# Patient Record
Sex: FEMALE | Race: WHITE | NOT HISPANIC OR LATINO | Employment: OTHER | URBAN - NONMETROPOLITAN AREA
[De-identification: names, ages, dates, MRNs, and addresses within clinical notes are randomized per-mention and may not be internally consistent; named-entity substitution may affect disease eponyms.]

---

## 2023-01-17 ENCOUNTER — TELEPHONE (OUTPATIENT)
Dept: GASTROENTEROLOGY | Facility: CLINIC | Age: 78
End: 2023-01-17
Payer: MEDICARE

## 2023-07-03 PROBLEM — R55 SYNCOPE, UNSPECIFIED SYNCOPE TYPE: Status: ACTIVE | Noted: 2023-07-03

## 2023-07-04 PROBLEM — Z86.73 HISTORY OF CVA (CEREBROVASCULAR ACCIDENT): Status: ACTIVE | Noted: 2023-07-04

## 2023-07-05 PROBLEM — I95.1 ORTHOSTATIC HYPOTENSION: Status: ACTIVE | Noted: 2023-07-05

## 2023-07-24 ENCOUNTER — OFFICE VISIT (OUTPATIENT)
Dept: WOUND CARE | Facility: HOSPITAL | Age: 78
End: 2023-07-24
Payer: MEDICARE

## 2023-07-24 PROCEDURE — G0463 HOSPITAL OUTPT CLINIC VISIT: HCPCS

## 2024-05-02 ENCOUNTER — HOSPITAL ENCOUNTER (INPATIENT)
Age: 79
LOS: 29 days | Discharge: HOSPICE | End: 2024-05-31
Attending: INTERNAL MEDICINE
Payer: MEDICARE

## 2024-05-02 DIAGNOSIS — I48.19 ATRIAL FIBRILLATION, PERSISTENT (HCC): ICD-10-CM

## 2024-05-02 DIAGNOSIS — I48.19 PERSISTENT ATRIAL FIBRILLATION (HCC): Primary | ICD-10-CM

## 2024-05-02 PROBLEM — U07.1 COVID-19: Status: ACTIVE | Noted: 2024-05-02

## 2024-05-02 LAB
CRP SERPL HS-MCNC: 1.95 MG/DL (ref 0–0.5)
GLUCOSE BLD-MCNC: 101 MG/DL (ref 70–99)
INR PPP: 1.14 (ref 0.88–1.18)
PERFORMED ON: ABNORMAL
PROTHROMBIN TIME: 14.3 SEC (ref 12–14.6)
SARS-COV-2, RAPID: POSITIVE
SODIUM UR-SCNC: 40 MMOL/L

## 2024-05-02 PROCEDURE — 2580000003 HC RX 258: Performed by: INTERNAL MEDICINE

## 2024-05-02 PROCEDURE — 85610 PROTHROMBIN TIME: CPT

## 2024-05-02 PROCEDURE — 83930 ASSAY OF BLOOD OSMOLALITY: CPT

## 2024-05-02 PROCEDURE — 84300 ASSAY OF URINE SODIUM: CPT

## 2024-05-02 PROCEDURE — 83935 ASSAY OF URINE OSMOLALITY: CPT

## 2024-05-02 PROCEDURE — 36415 COLL VENOUS BLD VENIPUNCTURE: CPT

## 2024-05-02 PROCEDURE — 2100000000 HC CCU R&B

## 2024-05-02 PROCEDURE — 6360000002 HC RX W HCPCS: Performed by: INTERNAL MEDICINE

## 2024-05-02 PROCEDURE — 82962 GLUCOSE BLOOD TEST: CPT

## 2024-05-02 PROCEDURE — 6370000000 HC RX 637 (ALT 250 FOR IP): Performed by: INTERNAL MEDICINE

## 2024-05-02 PROCEDURE — 86140 C-REACTIVE PROTEIN: CPT

## 2024-05-02 PROCEDURE — 2500000003 HC RX 250 WO HCPCS: Performed by: INTERNAL MEDICINE

## 2024-05-02 RX ORDER — LOSARTAN POTASSIUM 50 MG/1
50 TABLET ORAL DAILY
COMMUNITY

## 2024-05-02 RX ORDER — WARFARIN SODIUM 3 MG/1
3 TABLET ORAL DAILY
Status: DISCONTINUED | OUTPATIENT
Start: 2024-05-03 | End: 2024-05-03

## 2024-05-02 RX ORDER — FERROUS SULFATE 325(65) MG
325 TABLET ORAL
Status: DISCONTINUED | OUTPATIENT
Start: 2024-05-03 | End: 2024-05-04

## 2024-05-02 RX ORDER — OMEPRAZOLE 20 MG/1
20 CAPSULE, DELAYED RELEASE ORAL DAILY
COMMUNITY

## 2024-05-02 RX ORDER — INSULIN LISPRO 100 [IU]/ML
0-4 INJECTION, SOLUTION INTRAVENOUS; SUBCUTANEOUS NIGHTLY
Status: DISCONTINUED | OUTPATIENT
Start: 2024-05-02 | End: 2024-05-15

## 2024-05-02 RX ORDER — METOPROLOL TARTRATE 50 MG/1
50 TABLET, FILM COATED ORAL 2 TIMES DAILY
Status: DISCONTINUED | OUTPATIENT
Start: 2024-05-02 | End: 2024-05-04

## 2024-05-02 RX ORDER — POTASSIUM CHLORIDE 29.8 MG/ML
20 INJECTION INTRAVENOUS PRN
Status: DISCONTINUED | OUTPATIENT
Start: 2024-05-02 | End: 2024-05-31 | Stop reason: HOSPADM

## 2024-05-02 RX ORDER — METFORMIN HYDROCHLORIDE 500 MG/1
1000 TABLET, EXTENDED RELEASE ORAL
COMMUNITY

## 2024-05-02 RX ORDER — ATORVASTATIN CALCIUM 20 MG/1
20 TABLET, FILM COATED ORAL DAILY
Status: DISCONTINUED | OUTPATIENT
Start: 2024-05-03 | End: 2024-05-31 | Stop reason: HOSPADM

## 2024-05-02 RX ORDER — AMIODARONE HYDROCHLORIDE 200 MG/1
200 TABLET ORAL DAILY
Status: DISCONTINUED | OUTPATIENT
Start: 2024-05-03 | End: 2024-05-09 | Stop reason: ALTCHOICE

## 2024-05-02 RX ORDER — ACETAMINOPHEN 325 MG/1
650 TABLET ORAL EVERY 6 HOURS PRN
Status: DISCONTINUED | OUTPATIENT
Start: 2024-05-02 | End: 2024-05-31 | Stop reason: HOSPADM

## 2024-05-02 RX ORDER — INSULIN LISPRO 100 [IU]/ML
0-8 INJECTION, SOLUTION INTRAVENOUS; SUBCUTANEOUS
Status: DISCONTINUED | OUTPATIENT
Start: 2024-05-03 | End: 2024-05-15

## 2024-05-02 RX ORDER — POTASSIUM CHLORIDE 7.45 MG/ML
10 INJECTION INTRAVENOUS PRN
Status: DISCONTINUED | OUTPATIENT
Start: 2024-05-02 | End: 2024-05-31 | Stop reason: HOSPADM

## 2024-05-02 RX ORDER — DEXAMETHASONE SODIUM PHOSPHATE 10 MG/ML
6 INJECTION, SOLUTION INTRAMUSCULAR; INTRAVENOUS EVERY 24 HOURS
Status: DISCONTINUED | OUTPATIENT
Start: 2024-05-02 | End: 2024-05-03

## 2024-05-02 RX ORDER — MAGNESIUM SULFATE IN WATER 40 MG/ML
2000 INJECTION, SOLUTION INTRAVENOUS PRN
Status: DISCONTINUED | OUTPATIENT
Start: 2024-05-02 | End: 2024-05-31 | Stop reason: HOSPADM

## 2024-05-02 RX ORDER — ASPIRIN 81 MG/1
81 TABLET ORAL DAILY
COMMUNITY

## 2024-05-02 RX ORDER — LISINOPRIL 20 MG/1
20 TABLET ORAL DAILY
Status: DISCONTINUED | OUTPATIENT
Start: 2024-05-03 | End: 2024-05-03

## 2024-05-02 RX ORDER — INSULIN GLARGINE 100 [IU]/ML
45 INJECTION, SOLUTION SUBCUTANEOUS NIGHTLY
Status: ON HOLD | COMMUNITY
End: 2024-05-07 | Stop reason: HOSPADM

## 2024-05-02 RX ORDER — ACETAMINOPHEN 650 MG/1
650 SUPPOSITORY RECTAL EVERY 6 HOURS PRN
Status: DISCONTINUED | OUTPATIENT
Start: 2024-05-02 | End: 2024-05-31 | Stop reason: HOSPADM

## 2024-05-02 RX ORDER — AMLODIPINE BESYLATE 5 MG/1
5 TABLET ORAL DAILY
Status: DISCONTINUED | OUTPATIENT
Start: 2024-05-03 | End: 2024-05-03

## 2024-05-02 RX ORDER — SENNA AND DOCUSATE SODIUM 50; 8.6 MG/1; MG/1
1 TABLET, FILM COATED ORAL DAILY
Status: DISCONTINUED | OUTPATIENT
Start: 2024-05-03 | End: 2024-05-13

## 2024-05-02 RX ORDER — CELECOXIB 200 MG/1
200 CAPSULE ORAL 2 TIMES DAILY
Status: DISCONTINUED | OUTPATIENT
Start: 2024-05-03 | End: 2024-05-03

## 2024-05-02 RX ORDER — OXYCODONE HYDROCHLORIDE 5 MG/1
5 TABLET ORAL EVERY 6 HOURS PRN
Status: DISCONTINUED | OUTPATIENT
Start: 2024-05-02 | End: 2024-05-10

## 2024-05-02 RX ORDER — DILTIAZEM HYDROCHLORIDE 5 MG/ML
10 INJECTION INTRAVENOUS ONCE
Status: DISCONTINUED | OUTPATIENT
Start: 2024-05-02 | End: 2024-05-03

## 2024-05-02 RX ORDER — DONEPEZIL HYDROCHLORIDE 10 MG/1
10 TABLET, FILM COATED ORAL NIGHTLY
Status: DISCONTINUED | OUTPATIENT
Start: 2024-05-02 | End: 2024-05-31 | Stop reason: HOSPADM

## 2024-05-02 RX ORDER — LEVOTHYROXINE SODIUM 0.07 MG/1
75 TABLET ORAL DAILY
COMMUNITY

## 2024-05-02 RX ORDER — ACETAMINOPHEN 500 MG
1000 TABLET ORAL EVERY 12 HOURS
COMMUNITY

## 2024-05-02 RX ADMIN — ACETAMINOPHEN 650 MG: 325 TABLET ORAL at 21:39

## 2024-05-02 RX ADMIN — METOPROLOL TARTRATE 50 MG: 50 TABLET, FILM COATED ORAL at 21:34

## 2024-05-02 RX ADMIN — DEXAMETHASONE SODIUM PHOSPHATE 6 MG: 10 INJECTION INTRAMUSCULAR; INTRAVENOUS at 21:33

## 2024-05-02 RX ADMIN — DILTIAZEM HYDROCHLORIDE 5 MG/HR: 5 INJECTION, SOLUTION INTRAVENOUS at 20:30

## 2024-05-02 RX ADMIN — DONEPEZIL HYDROCHLORIDE 10 MG: 10 TABLET, FILM COATED ORAL at 21:34

## 2024-05-02 ASSESSMENT — PAIN DESCRIPTION - ORIENTATION: ORIENTATION: LEFT

## 2024-05-02 ASSESSMENT — PAIN DESCRIPTION - DESCRIPTORS: DESCRIPTORS: ACHING;SORE

## 2024-05-02 ASSESSMENT — PAIN - FUNCTIONAL ASSESSMENT: PAIN_FUNCTIONAL_ASSESSMENT: PREVENTS OR INTERFERES SOME ACTIVE ACTIVITIES AND ADLS

## 2024-05-02 ASSESSMENT — PAIN SCALES - GENERAL
PAINLEVEL_OUTOF10: 7
PAINLEVEL_OUTOF10: 4

## 2024-05-02 ASSESSMENT — PAIN DESCRIPTION - LOCATION: LOCATION: KNEE

## 2024-05-03 ENCOUNTER — APPOINTMENT (OUTPATIENT)
Dept: GENERAL RADIOLOGY | Age: 79
End: 2024-05-03
Attending: INTERNAL MEDICINE
Payer: MEDICARE

## 2024-05-03 LAB
ALBUMIN SERPL-MCNC: 3.1 G/DL (ref 3.5–5.2)
ALP SERPL-CCNC: 109 U/L (ref 35–104)
ALT SERPL-CCNC: 15 U/L (ref 5–33)
ANION GAP SERPL CALCULATED.3IONS-SCNC: 12 MMOL/L (ref 7–19)
AST SERPL-CCNC: 17 U/L (ref 5–32)
BASOPHILS # BLD: 0 K/UL (ref 0–0.2)
BASOPHILS NFR BLD: 0.3 % (ref 0–1)
BILIRUB SERPL-MCNC: <0.2 MG/DL (ref 0.2–1.2)
BUN SERPL-MCNC: 23 MG/DL (ref 8–23)
CALCIUM SERPL-MCNC: 8.5 MG/DL (ref 8.8–10.2)
CHLORIDE SERPL-SCNC: 85 MMOL/L (ref 98–111)
CO2 SERPL-SCNC: 21 MMOL/L (ref 22–29)
CREAT SERPL-MCNC: 1.1 MG/DL (ref 0.5–0.9)
D DIMER PPP FEU-MCNC: 0.97 UG/ML FEU (ref 0–0.48)
EOSINOPHIL # BLD: 0 K/UL (ref 0–0.6)
EOSINOPHIL NFR BLD: 0.3 % (ref 0–5)
ERYTHROCYTE [DISTWIDTH] IN BLOOD BY AUTOMATED COUNT: 14.1 % (ref 11.5–14.5)
FERRITIN SERPL-MCNC: 44.9 NG/ML (ref 13–150)
GLUCOSE BLD-MCNC: 128 MG/DL (ref 70–99)
GLUCOSE BLD-MCNC: 132 MG/DL (ref 70–99)
GLUCOSE BLD-MCNC: 161 MG/DL (ref 70–99)
GLUCOSE BLD-MCNC: 209 MG/DL (ref 70–99)
GLUCOSE BLD-MCNC: 253 MG/DL (ref 70–99)
GLUCOSE BLD-MCNC: 356 MG/DL (ref 70–99)
GLUCOSE BLD-MCNC: 392 MG/DL (ref 70–99)
GLUCOSE BLD-MCNC: 396 MG/DL (ref 70–99)
GLUCOSE BLD-MCNC: 476 MG/DL (ref 70–99)
GLUCOSE SERPL-MCNC: 353 MG/DL (ref 74–109)
HBA1C MFR BLD: 8 % (ref 4–6)
HCT VFR BLD AUTO: 35.5 % (ref 37–47)
HGB BLD-MCNC: 11.3 G/DL (ref 12–16)
IMM GRANULOCYTES # BLD: 0.1 K/UL
LDH SERPL-CCNC: 158 U/L (ref 91–215)
LYMPHOCYTES # BLD: 1.1 K/UL (ref 1.1–4.5)
LYMPHOCYTES NFR BLD: 9.5 % (ref 20–40)
MAGNESIUM SERPL-MCNC: 1.5 MG/DL (ref 1.6–2.4)
MCH RBC QN AUTO: 25.1 PG (ref 27–31)
MCHC RBC AUTO-ENTMCNC: 31.8 G/DL (ref 33–37)
MCV RBC AUTO: 78.9 FL (ref 81–99)
MONOCYTES # BLD: 0.1 K/UL (ref 0–0.9)
MONOCYTES NFR BLD: 1.2 % (ref 0–10)
NEUTROPHILS # BLD: 10 K/UL (ref 1.5–7.5)
NEUTS SEG NFR BLD: 88.2 % (ref 50–65)
OSMOLALITY SERPL CALC.SUM OF ELEC: 257 MOSM/KG (ref 275–300)
OSMOLALITY URINE: 354 MOSM/KG (ref 250–1200)
PERFORMED ON: ABNORMAL
PLATELET # BLD AUTO: 367 K/UL (ref 130–400)
PMV BLD AUTO: 8.9 FL (ref 9.4–12.3)
POTASSIUM SERPL-SCNC: 6 MMOL/L (ref 3.5–5)
POTASSIUM SERPL-SCNC: 6 MMOL/L (ref 3.5–5)
PROT SERPL-MCNC: 7 G/DL (ref 6.6–8.7)
RBC # BLD AUTO: 4.5 M/UL (ref 4.2–5.4)
REASON FOR REJECTION: NORMAL
REJECTED TEST: NORMAL
SODIUM SERPL-SCNC: 118 MMOL/L (ref 136–145)
SODIUM SERPL-SCNC: 118 MMOL/L (ref 136–145)
SODIUM SERPL-SCNC: 121 MMOL/L (ref 136–145)
SODIUM SERPL-SCNC: 122 MMOL/L (ref 136–145)
WBC # BLD AUTO: 11.4 K/UL (ref 4.8–10.8)

## 2024-05-03 PROCEDURE — 83615 LACTATE (LD) (LDH) ENZYME: CPT

## 2024-05-03 PROCEDURE — 6370000000 HC RX 637 (ALT 250 FOR IP): Performed by: INTERNAL MEDICINE

## 2024-05-03 PROCEDURE — 83735 ASSAY OF MAGNESIUM: CPT

## 2024-05-03 PROCEDURE — 2580000003 HC RX 258: Performed by: INTERNAL MEDICINE

## 2024-05-03 PROCEDURE — 84132 ASSAY OF SERUM POTASSIUM: CPT

## 2024-05-03 PROCEDURE — 83036 HEMOGLOBIN GLYCOSYLATED A1C: CPT

## 2024-05-03 PROCEDURE — 71045 X-RAY EXAM CHEST 1 VIEW: CPT

## 2024-05-03 PROCEDURE — 82728 ASSAY OF FERRITIN: CPT

## 2024-05-03 PROCEDURE — 84295 ASSAY OF SERUM SODIUM: CPT

## 2024-05-03 PROCEDURE — 6360000002 HC RX W HCPCS

## 2024-05-03 PROCEDURE — 36415 COLL VENOUS BLD VENIPUNCTURE: CPT

## 2024-05-03 PROCEDURE — 80053 COMPREHEN METABOLIC PANEL: CPT

## 2024-05-03 PROCEDURE — 2100000000 HC CCU R&B

## 2024-05-03 PROCEDURE — 6360000002 HC RX W HCPCS: Performed by: INTERNAL MEDICINE

## 2024-05-03 PROCEDURE — 82962 GLUCOSE BLOOD TEST: CPT

## 2024-05-03 PROCEDURE — 85025 COMPLETE CBC W/AUTO DIFF WBC: CPT

## 2024-05-03 PROCEDURE — 85379 FIBRIN DEGRADATION QUANT: CPT

## 2024-05-03 RX ORDER — NALOXONE HYDROCHLORIDE 0.4 MG/ML
INJECTION, SOLUTION INTRAMUSCULAR; INTRAVENOUS; SUBCUTANEOUS
Status: COMPLETED
Start: 2024-05-03 | End: 2024-05-03

## 2024-05-03 RX ORDER — SODIUM CHLORIDE 1 G/1
1 TABLET ORAL
Status: DISCONTINUED | OUTPATIENT
Start: 2024-05-03 | End: 2024-05-05

## 2024-05-03 RX ORDER — MAGNESIUM SULFATE IN WATER 40 MG/ML
2000 INJECTION, SOLUTION INTRAVENOUS ONCE
Status: DISCONTINUED | OUTPATIENT
Start: 2024-05-03 | End: 2024-05-03

## 2024-05-03 RX ORDER — INSULIN GLARGINE 100 [IU]/ML
10 INJECTION, SOLUTION SUBCUTANEOUS 2 TIMES DAILY
Status: DISCONTINUED | OUTPATIENT
Start: 2024-05-03 | End: 2024-05-04

## 2024-05-03 RX ORDER — ERGOCALCIFEROL 1.25 MG/1
50000 CAPSULE ORAL WEEKLY
Status: DISCONTINUED | OUTPATIENT
Start: 2024-05-03 | End: 2024-05-31 | Stop reason: HOSPADM

## 2024-05-03 RX ORDER — DEXTROSE MONOHYDRATE 100 MG/ML
INJECTION, SOLUTION INTRAVENOUS CONTINUOUS PRN
Status: DISCONTINUED | OUTPATIENT
Start: 2024-05-03 | End: 2024-05-09 | Stop reason: SDUPTHER

## 2024-05-03 RX ORDER — PROMETHAZINE HYDROCHLORIDE 25 MG/ML
6.25 INJECTION, SOLUTION INTRAMUSCULAR; INTRAVENOUS ONCE
Status: COMPLETED | OUTPATIENT
Start: 2024-05-03 | End: 2024-05-03

## 2024-05-03 RX ORDER — NALOXONE HYDROCHLORIDE 0.4 MG/ML
0.4 INJECTION, SOLUTION INTRAMUSCULAR; INTRAVENOUS; SUBCUTANEOUS PRN
Status: DISCONTINUED | OUTPATIENT
Start: 2024-05-03 | End: 2024-05-31 | Stop reason: HOSPADM

## 2024-05-03 RX ORDER — INSULIN LISPRO 100 [IU]/ML
6 INJECTION, SOLUTION INTRAVENOUS; SUBCUTANEOUS
Status: DISCONTINUED | OUTPATIENT
Start: 2024-05-03 | End: 2024-05-13

## 2024-05-03 RX ORDER — INSULIN GLARGINE 100 [IU]/ML
5 INJECTION, SOLUTION SUBCUTANEOUS ONCE
Status: COMPLETED | OUTPATIENT
Start: 2024-05-03 | End: 2024-05-03

## 2024-05-03 RX ORDER — FUROSEMIDE 20 MG/1
20 TABLET ORAL 2 TIMES DAILY
Status: DISCONTINUED | OUTPATIENT
Start: 2024-05-03 | End: 2024-05-04

## 2024-05-03 RX ORDER — DEXAMETHASONE SODIUM PHOSPHATE 4 MG/ML
4 INJECTION, SOLUTION INTRA-ARTICULAR; INTRALESIONAL; INTRAMUSCULAR; INTRAVENOUS; SOFT TISSUE EVERY 24 HOURS
Status: DISCONTINUED | OUTPATIENT
Start: 2024-05-04 | End: 2024-05-04

## 2024-05-03 RX ORDER — SODIUM CHLORIDE 9 MG/ML
INJECTION, SOLUTION INTRAVENOUS CONTINUOUS
Status: DISCONTINUED | OUTPATIENT
Start: 2024-05-03 | End: 2024-05-03

## 2024-05-03 RX ORDER — MAGNESIUM SULFATE IN WATER 40 MG/ML
2000 INJECTION, SOLUTION INTRAVENOUS ONCE
Status: COMPLETED | OUTPATIENT
Start: 2024-05-03 | End: 2024-05-03

## 2024-05-03 RX ORDER — DEXTROSE MONOHYDRATE 100 MG/ML
INJECTION, SOLUTION INTRAVENOUS CONTINUOUS PRN
Status: DISCONTINUED | OUTPATIENT
Start: 2024-05-03 | End: 2024-05-31 | Stop reason: HOSPADM

## 2024-05-03 RX ADMIN — SODIUM CHLORIDE: 9 INJECTION, SOLUTION INTRAVENOUS at 04:35

## 2024-05-03 RX ADMIN — DONEPEZIL HYDROCHLORIDE 10 MG: 10 TABLET, FILM COATED ORAL at 20:09

## 2024-05-03 RX ADMIN — INSULIN LISPRO 6 UNITS: 100 INJECTION, SOLUTION INTRAVENOUS; SUBCUTANEOUS at 16:57

## 2024-05-03 RX ADMIN — SODIUM ZIRCONIUM CYCLOSILICATE 10 G: 10 POWDER, FOR SUSPENSION ORAL at 08:59

## 2024-05-03 RX ADMIN — SODIUM CHLORIDE 1 G: 1 TABLET ORAL at 18:10

## 2024-05-03 RX ADMIN — BARICITINIB 2 MG: 2 TABLET, FILM COATED ORAL at 09:00

## 2024-05-03 RX ADMIN — SODIUM ZIRCONIUM CYCLOSILICATE 10 G: 10 POWDER, FOR SUSPENSION ORAL at 18:11

## 2024-05-03 RX ADMIN — INSULIN LISPRO 8 UNITS: 100 INJECTION, SOLUTION INTRAVENOUS; SUBCUTANEOUS at 12:37

## 2024-05-03 RX ADMIN — FUROSEMIDE 20 MG: 20 TABLET ORAL at 10:36

## 2024-05-03 RX ADMIN — METOPROLOL TARTRATE 50 MG: 50 TABLET, FILM COATED ORAL at 10:40

## 2024-05-03 RX ADMIN — GLUCAGON 1 MG: KIT at 03:16

## 2024-05-03 RX ADMIN — MAGNESIUM SULFATE HEPTAHYDRATE 2000 MG: 40 INJECTION, SOLUTION INTRAVENOUS at 04:39

## 2024-05-03 RX ADMIN — ACETAMINOPHEN 650 MG: 325 TABLET ORAL at 05:38

## 2024-05-03 RX ADMIN — DICLOFENAC SODIUM 2 G: 10 GEL TOPICAL at 20:21

## 2024-05-03 RX ADMIN — Medication 5000 UNITS: at 18:10

## 2024-05-03 RX ADMIN — AMIODARONE HYDROCHLORIDE 200 MG: 200 TABLET ORAL at 10:39

## 2024-05-03 RX ADMIN — FUROSEMIDE 20 MG: 20 TABLET ORAL at 18:11

## 2024-05-03 RX ADMIN — CELECOXIB 200 MG: 200 CAPSULE ORAL at 09:00

## 2024-05-03 RX ADMIN — METOPROLOL TARTRATE 50 MG: 50 TABLET, FILM COATED ORAL at 20:09

## 2024-05-03 RX ADMIN — DILTIAZEM HYDROCHLORIDE 30 MG: 30 TABLET, FILM COATED ORAL at 23:43

## 2024-05-03 RX ADMIN — INSULIN LISPRO 8 UNITS: 100 INJECTION, SOLUTION INTRAVENOUS; SUBCUTANEOUS at 08:59

## 2024-05-03 RX ADMIN — PROMETHAZINE HYDROCHLORIDE 6.25 MG: 25 INJECTION INTRAMUSCULAR; INTRAVENOUS at 06:17

## 2024-05-03 RX ADMIN — NALOXONE HYDROCHLORIDE 0.4 MG: 0.4 INJECTION, SOLUTION INTRAMUSCULAR; INTRAVENOUS; SUBCUTANEOUS at 03:16

## 2024-05-03 RX ADMIN — DICLOFENAC SODIUM 2 G: 10 GEL TOPICAL at 10:41

## 2024-05-03 RX ADMIN — ERGOCALCIFEROL 50000 UNITS: 1.25 CAPSULE ORAL at 18:10

## 2024-05-03 RX ADMIN — OXYCODONE HYDROCHLORIDE 5 MG: 5 TABLET ORAL at 01:29

## 2024-05-03 RX ADMIN — DEXAMETHASONE SODIUM PHOSPHATE 6 MG: 10 INJECTION INTRAMUSCULAR; INTRAVENOUS at 20:09

## 2024-05-03 RX ADMIN — NALXONE HYDROCHLORIDE 0.4 MG: 0.4 INJECTION INTRAMUSCULAR; INTRAVENOUS; SUBCUTANEOUS at 03:16

## 2024-05-03 RX ADMIN — AMLODIPINE BESYLATE 5 MG: 5 TABLET ORAL at 09:00

## 2024-05-03 RX ADMIN — SODIUM ZIRCONIUM CYCLOSILICATE 10 G: 10 POWDER, FOR SUSPENSION ORAL at 20:10

## 2024-05-03 RX ADMIN — INSULIN GLARGINE 10 UNITS: 100 INJECTION, SOLUTION SUBCUTANEOUS at 10:42

## 2024-05-03 RX ADMIN — SODIUM CHLORIDE 1 G: 1 TABLET ORAL at 10:37

## 2024-05-03 RX ADMIN — SODIUM CHLORIDE 3.9 UNITS/HR: 9 INJECTION, SOLUTION INTRAVENOUS at 19:22

## 2024-05-03 RX ADMIN — ATORVASTATIN CALCIUM 20 MG: 20 TABLET, FILM COATED ORAL at 09:00

## 2024-05-03 RX ADMIN — FERROUS SULFATE TAB 325 MG (65 MG ELEMENTAL FE) 325 MG: 325 (65 FE) TAB at 09:00

## 2024-05-03 RX ADMIN — INSULIN GLARGINE 10 UNITS: 100 INJECTION, SOLUTION SUBCUTANEOUS at 20:10

## 2024-05-03 RX ADMIN — INSULIN GLARGINE 5 UNITS: 100 INJECTION, SOLUTION SUBCUTANEOUS at 04:37

## 2024-05-03 RX ADMIN — INSULIN LISPRO 8 UNITS: 100 INJECTION, SOLUTION INTRAVENOUS; SUBCUTANEOUS at 16:57

## 2024-05-03 RX ADMIN — SENNOSIDES, DOCUSATE SODIUM 1 TABLET: 8.6; 5 TABLET ORAL at 09:00

## 2024-05-03 RX ADMIN — INSULIN LISPRO 6 UNITS: 100 INJECTION, SOLUTION INTRAVENOUS; SUBCUTANEOUS at 12:37

## 2024-05-03 RX ADMIN — DILTIAZEM HYDROCHLORIDE 30 MG: 30 TABLET, FILM COATED ORAL at 18:10

## 2024-05-03 ASSESSMENT — PAIN SCALES - GENERAL
PAINLEVEL_OUTOF10: 9
PAINLEVEL_OUTOF10: 5
PAINLEVEL_OUTOF10: 0
PAINLEVEL_OUTOF10: 0
PAINLEVEL_OUTOF10: 5
PAINLEVEL_OUTOF10: 4
PAINLEVEL_OUTOF10: 9

## 2024-05-03 ASSESSMENT — PAIN DESCRIPTION - LOCATION
LOCATION: KNEE

## 2024-05-03 ASSESSMENT — PAIN DESCRIPTION - ORIENTATION
ORIENTATION: LEFT

## 2024-05-03 ASSESSMENT — PAIN - FUNCTIONAL ASSESSMENT
PAIN_FUNCTIONAL_ASSESSMENT: PREVENTS OR INTERFERES SOME ACTIVE ACTIVITIES AND ADLS
PAIN_FUNCTIONAL_ASSESSMENT: PREVENTS OR INTERFERES WITH MANY ACTIVE NOT PASSIVE ACTIVITIES

## 2024-05-03 ASSESSMENT — PAIN DESCRIPTION - DESCRIPTORS
DESCRIPTORS: ACHING;SORE
DESCRIPTORS: ACHING;SORE

## 2024-05-03 NOTE — H&P
Hospitalist History & Physical  [facility]    Patient: Edie Cat   : 1945   MRN: [id number]  Code Status: Full Code  PCP: Dudley Eduardo  Date of Service: 2024    Chief Complaint:   Shortness of breath    History of Present Illness:   78-year-old female with past medical history as listed below initially presented to OSH ER with shortness of breath.  Workup in that ER revealed COVID-19 infection, acute hypoxemic respiratory failure, and chronic AF with RVR.  Patient transferred to this facility for escalation of care.  Upon interview and examination, patient's main complaint is the shortness of breath.  Denies chest pain, fever, chills, or abdominal pain.  No further history provided.  History of dementia.  Admitted to critical care unit.    Review of Systems:   A comprehensive review of systems was negative except for: Respiratory: positive for shortness of breath    Past Medical History:     Past Medical History:   Diagnosis Date    A-fib (HCC)     CAD (coronary artery disease)     Cerebral artery occlusion with cerebral infarction (HCC)     COPD (chronic obstructive pulmonary disease) (HCC)     COVID     Dementia (HCC)     Diabetes mellitus (HCC)     Hx of blood clots     Hyperlipidemia     Hypertension     Knee arthroplasty 2011    Thyroid disease     TIA (transient ischemic attack)          Past Surgical History:     Past Surgical History:   Procedure Laterality Date    ABDOMEN SURGERY      APPENDECTOMY      CHOLECYSTECTOMY      TOTAL KNEE ARTHROPLASTY  2011        Family History:   History reviewed. No pertinent family history.     Social History:     Social History     Socioeconomic History    Marital status:      Spouse name: None    Number of children: None    Years of education: None    Highest education level: None   Tobacco Use    Smoking status: Never    Smokeless tobacco: Never   Vaping Use    Vaping Use: Never used   Substance and Sexual Activity     deficits    No results found for this or any previous visit (from the past 72 hour(s)).    No intake/output data recorded.    No results found.    Assessment and Plan:   78-year-old female with history of dementia admitted to critical care unit for COVID-19 infection, acute hypoxemic respiratory failure, chronic AF with RVR, and hyponatremia.    Dexamethasone  Pharmacy to dose Tocilizumab  Currently requiring 2 L NC  CTA chest at OSH without evidence of PE  Cardizem gtt  Continue Coumadin  Follow sodium  Hypertonic saline as warranted  Discussed treatment plan with patient/RN    Total critical care time: 70 minutes  Total time spent managing the care of this patient: 70 minutes    The above note was generated using voice recognition software. Inadvertent typographical errors in transcription may have occurred.    Juan Manuel Maradiaga MD  5/2/2024 9:01 PM

## 2024-05-03 NOTE — PROGRESS NOTES
Patient was on cardizem drip.  Heart rate dropped below 60.  Cardizem drip stopped.  Heart rate continued to drop as low as 20s.  Patient became symptomatic, diaphoretic, nauseous.  Still alert and oriented.  Rapid Response called.    Gave narcan iv and glucagon.  CXR.  Patient heart rate still in 30s.  Nausea resolved.

## 2024-05-03 NOTE — CARE COORDINATION
Gave pt SNF choice list for area nursing facilities.  She will discuss with family although she states that her daughter takes care of her needs. Electronically signed by Anna Garcia RN on 5/3/2024 at 1:36 PM

## 2024-05-03 NOTE — PROGRESS NOTES
Hospitalist Progress Note    Patient:  Edie Cat  YOB: 1945  Date of Service: 5/3/2024  MRN: [id number]   Acct: [id number]   Primary Care Physician: Dudley Eduardo  Advance Directive: Full Code  Admit Date: 5/2/2024       Hospital Day: 1  Referring Provider: Darwin Ledesma DO    Patient Seen, Chart, Consults, Notes, Labs, Radiology studies reviewed.    Subjective:  Edie Cat is a 78 y.o. female  whom we are following for COVID-19 infection, atrial fibrillation with rapid ventricular response, hypoxemic respiratory failure, hyponatremia, mild hyperkalemia.  She is doing much better this morning.  Breathing is improved.  Heart rate is better controlled.  We will resume her antihypertensives.    Allergies:  Amoxicillin    Medicines:  Current Facility-Administered Medications   Medication Dose Route Frequency Provider Last Rate Last Admin    naloxone (NARCAN) injection 0.4 mg  0.4 mg IntraVENous PRN Juan Manuel Maradiaga MD   0.4 mg at 05/03/24 0316    sodium zirconium cyclosilicate (LOKELMA) oral suspension 10 g  10 g Oral TID Juan Manuel Maradiaga MD   10 g at 05/03/24 0859    baricitinib (OLUMIANT) tablet 2 mg  2 mg Oral Daily Juan Manuel Maradiaga MD   2 mg at 05/03/24 0900    diclofenac sodium (VOLTAREN) 1 % gel 2 g  2 g Topical TID PRN Darwin Ledesma, DO   2 g at 05/03/24 1041    insulin glargine (LANTUS) injection vial 10 Units  10 Units SubCUTAneous BID Darwin Ledesma DO   10 Units at 05/03/24 1042    glucose chewable tablet 16 g  4 tablet Oral PRN Darwin Ledesma, DO        dextrose bolus 10% 125 mL  125 mL IntraVENous PRN Darwin Ledesma DO        Or    dextrose bolus 10% 250 mL  250 mL IntraVENous PRN Darwin Ledesma DO        glucagon injection 1 mg  1 mg SubCUTAneous PRN Darwin Ledesma DO        dextrose 10 % infusion   IntraVENous Continuous PRN Darwin Ledesma DO        furosemide (LASIX) tablet 20 mg  20 mg Oral BID Darwin Ledesma DO   20 mg at   6 mg IntraVENous Q24H Juan Manuel Maradiaga MD   6 mg at 05/02/24 3889       Past Medical History:  Past Medical History:   Diagnosis Date    A-fib (HCC)     CAD (coronary artery disease)     Cerebral artery occlusion with cerebral infarction (HCC)     COPD (chronic obstructive pulmonary disease) (HCC)     COVID     Dementia (HCC)     Diabetes mellitus (HCC)     Hx of blood clots     Hyperlipidemia     Hypertension     Knee arthroplasty 08/09/2011    Thyroid disease     TIA (transient ischemic attack)        Past Surgical History:  Past Surgical History:   Procedure Laterality Date    ABDOMEN SURGERY      APPENDECTOMY      CHOLECYSTECTOMY      TOTAL KNEE ARTHROPLASTY  08/09/2011       Family History  History reviewed. No pertinent family history.    Social History  Social History     Socioeconomic History    Marital status:      Spouse name: Not on file    Number of children: Not on file    Years of education: Not on file    Highest education level: Not on file   Occupational History    Not on file   Tobacco Use    Smoking status: Never    Smokeless tobacco: Never   Vaping Use    Vaping Use: Never used   Substance and Sexual Activity    Alcohol use: Not Currently    Drug use: Never    Sexual activity: Not on file   Other Topics Concern    Not on file   Social History Narrative    Not on file     Social Determinants of Health     Financial Resource Strain: Not on file   Food Insecurity: Not on file   Transportation Needs: Not on file   Physical Activity: Not on file   Stress: Not on file   Social Connections: Not on file   Intimate Partner Violence: Not on file   Housing Stability: Not on file         Review of Systems:    Review of Systems   Constitutional:  Negative for activity change and fatigue.   HENT:  Negative for rhinorrhea and voice change.    Eyes:  Negative for visual disturbance.   Respiratory:  Negative for cough and shortness of breath.    Cardiovascular:  Negative for chest pain and leg swelling.

## 2024-05-03 NOTE — PROGRESS NOTES
4 Eyes Skin Assessment     NAME:  Edie Cat  YOB: 1945  MEDICAL RECORD NUMBER:  [id number]    The patient is being assessed for  Admission    I agree that at least one RN has performed a thorough Head to Toe Skin Assessment on the patient. ALL assessment sites listed below have been assessed.      Areas assessed by both nurses:    Sacrum. Buttock, Coccyx, Ischium        Does the Patient have a Wound? No noted wound(s)     Multiple scattered abrasions/scratches  Bryon Prevention initiated by RN: Yes  Wound Care Orders initiated by RN: No    Pressure Injury (Stage 3,4, Unstageable, DTI, NWPT, and Complex wounds) if present, place Wound referral order by RN under : No    New Ostomies, if present place, Ostomy referral order under : No     Nurse 1 eSignature: Electronically signed by Tena Grant RN on 5/3/24 at 7:05 AM CDT    **SHARE this note so that the co-signing nurse can place an eSignature**    Nurse 2 eSignature: {Esignature:[id number]}

## 2024-05-03 NOTE — CARE COORDINATION
Received consult:  this CM is attempting to contact [facility] to obtain pt's [facility] benefits for placement.  Will continue to attempt contact w/ [facility]. Electronically signed by Anna Garcia RN on 5/3/2024 at 9:24 AM

## 2024-05-03 NOTE — CARE COORDINATION
Spoke with Kiera at [facility], pt does have ambulance transportation benefits but does not have SNF benefits through the [facility]. Electronically signed by Anna Garcia RN on 5/3/2024 at 9:54 AM

## 2024-05-04 LAB
ALBUMIN SERPL-MCNC: 3.3 G/DL (ref 3.5–5.2)
ALP SERPL-CCNC: 101 U/L (ref 35–104)
ALT SERPL-CCNC: 16 U/L (ref 5–33)
ANION GAP SERPL CALCULATED.3IONS-SCNC: 12 MMOL/L (ref 7–19)
AST SERPL-CCNC: 14 U/L (ref 5–32)
BASOPHILS # BLD: 0 K/UL (ref 0–0.2)
BASOPHILS NFR BLD: 0.1 % (ref 0–1)
BILIRUB SERPL-MCNC: 0.3 MG/DL (ref 0.2–1.2)
BUN SERPL-MCNC: 36 MG/DL (ref 8–23)
CALCIUM SERPL-MCNC: 8.9 MG/DL (ref 8.8–10.2)
CHLORIDE SERPL-SCNC: 88 MMOL/L (ref 98–111)
CO2 SERPL-SCNC: 24 MMOL/L (ref 22–29)
CREAT SERPL-MCNC: 1.1 MG/DL (ref 0.5–0.9)
EOSINOPHIL # BLD: 0 K/UL (ref 0–0.6)
EOSINOPHIL NFR BLD: 0 % (ref 0–5)
ERYTHROCYTE [DISTWIDTH] IN BLOOD BY AUTOMATED COUNT: 14 % (ref 11.5–14.5)
GLUCOSE BLD-MCNC: 151 MG/DL (ref 70–99)
GLUCOSE BLD-MCNC: 255 MG/DL (ref 70–99)
GLUCOSE BLD-MCNC: 273 MG/DL (ref 70–99)
GLUCOSE BLD-MCNC: 339 MG/DL (ref 70–99)
GLUCOSE SERPL-MCNC: 195 MG/DL (ref 74–109)
HCT VFR BLD AUTO: 31 % (ref 37–47)
HGB BLD-MCNC: 10.2 G/DL (ref 12–16)
IMM GRANULOCYTES # BLD: 0.1 K/UL
INR PPP: 1.11 (ref 0.88–1.18)
LYMPHOCYTES # BLD: 1.3 K/UL (ref 1.1–4.5)
LYMPHOCYTES NFR BLD: 10.4 % (ref 20–40)
MAGNESIUM SERPL-MCNC: 1.8 MG/DL (ref 1.6–2.4)
MCH RBC QN AUTO: 25.4 PG (ref 27–31)
MCHC RBC AUTO-ENTMCNC: 32.9 G/DL (ref 33–37)
MCV RBC AUTO: 77.1 FL (ref 81–99)
MONOCYTES # BLD: 0.1 K/UL (ref 0–0.9)
MONOCYTES NFR BLD: 1.1 % (ref 0–10)
NEUTROPHILS # BLD: 11.4 K/UL (ref 1.5–7.5)
NEUTS SEG NFR BLD: 87.6 % (ref 50–65)
PERFORMED ON: ABNORMAL
PHOSPHATE SERPL-MCNC: 4.2 MG/DL (ref 2.5–4.5)
PLATELET # BLD AUTO: 374 K/UL (ref 130–400)
PMV BLD AUTO: 8.8 FL (ref 9.4–12.3)
POTASSIUM SERPL-SCNC: 5.2 MMOL/L (ref 3.5–5)
PROT SERPL-MCNC: 7.4 G/DL (ref 6.6–8.7)
PROTHROMBIN TIME: 14 SEC (ref 12–14.6)
RBC # BLD AUTO: 4.02 M/UL (ref 4.2–5.4)
SODIUM SERPL-SCNC: 116 MMOL/L (ref 136–145)
SODIUM SERPL-SCNC: 120 MMOL/L (ref 136–145)
SODIUM SERPL-SCNC: 122 MMOL/L (ref 136–145)
SODIUM SERPL-SCNC: 124 MMOL/L (ref 136–145)
WBC # BLD AUTO: 12.9 K/UL (ref 4.8–10.8)

## 2024-05-04 PROCEDURE — 1200000000 HC SEMI PRIVATE

## 2024-05-04 PROCEDURE — 36415 COLL VENOUS BLD VENIPUNCTURE: CPT

## 2024-05-04 PROCEDURE — 84295 ASSAY OF SERUM SODIUM: CPT

## 2024-05-04 PROCEDURE — 85025 COMPLETE CBC W/AUTO DIFF WBC: CPT

## 2024-05-04 PROCEDURE — 6370000000 HC RX 637 (ALT 250 FOR IP): Performed by: INTERNAL MEDICINE

## 2024-05-04 PROCEDURE — 80053 COMPREHEN METABOLIC PANEL: CPT

## 2024-05-04 PROCEDURE — 85610 PROTHROMBIN TIME: CPT

## 2024-05-04 PROCEDURE — 2700000000 HC OXYGEN THERAPY PER DAY

## 2024-05-04 PROCEDURE — 83735 ASSAY OF MAGNESIUM: CPT

## 2024-05-04 PROCEDURE — 84100 ASSAY OF PHOSPHORUS: CPT

## 2024-05-04 PROCEDURE — 82962 GLUCOSE BLOOD TEST: CPT

## 2024-05-04 RX ORDER — INSULIN GLARGINE 100 [IU]/ML
15 INJECTION, SOLUTION SUBCUTANEOUS 2 TIMES DAILY
Status: DISCONTINUED | OUTPATIENT
Start: 2024-05-04 | End: 2024-05-09

## 2024-05-04 RX ORDER — DILTIAZEM HYDROCHLORIDE 60 MG/1
60 TABLET, FILM COATED ORAL EVERY 6 HOURS SCHEDULED
Status: DISCONTINUED | OUTPATIENT
Start: 2024-05-04 | End: 2024-05-09

## 2024-05-04 RX ORDER — DILTIAZEM HYDROCHLORIDE 5 MG/ML
10 INJECTION INTRAVENOUS ONCE
Status: DISCONTINUED | OUTPATIENT
Start: 2024-05-04 | End: 2024-05-04

## 2024-05-04 RX ORDER — FUROSEMIDE 40 MG/1
40 TABLET ORAL 2 TIMES DAILY
Status: DISCONTINUED | OUTPATIENT
Start: 2024-05-04 | End: 2024-05-05

## 2024-05-04 RX ORDER — METOPROLOL TARTRATE 50 MG/1
100 TABLET, FILM COATED ORAL 2 TIMES DAILY
Status: DISCONTINUED | OUTPATIENT
Start: 2024-05-04 | End: 2024-05-05

## 2024-05-04 RX ADMIN — INSULIN LISPRO 6 UNITS: 100 INJECTION, SOLUTION INTRAVENOUS; SUBCUTANEOUS at 12:20

## 2024-05-04 RX ADMIN — DILTIAZEM HYDROCHLORIDE 60 MG: 30 TABLET, FILM COATED ORAL at 12:19

## 2024-05-04 RX ADMIN — DONEPEZIL HYDROCHLORIDE 10 MG: 10 TABLET, FILM COATED ORAL at 19:53

## 2024-05-04 RX ADMIN — INSULIN GLARGINE 15 UNITS: 100 INJECTION, SOLUTION SUBCUTANEOUS at 21:40

## 2024-05-04 RX ADMIN — WARFARIN SODIUM 3 MG: 2 TABLET ORAL at 18:21

## 2024-05-04 RX ADMIN — FERROUS SULFATE TAB 325 MG (65 MG ELEMENTAL FE) 325 MG: 325 (65 FE) TAB at 08:25

## 2024-05-04 RX ADMIN — INSULIN LISPRO 6 UNITS: 100 INJECTION, SOLUTION INTRAVENOUS; SUBCUTANEOUS at 12:19

## 2024-05-04 RX ADMIN — INSULIN LISPRO 4 UNITS: 100 INJECTION, SOLUTION INTRAVENOUS; SUBCUTANEOUS at 17:10

## 2024-05-04 RX ADMIN — AMIODARONE HYDROCHLORIDE 200 MG: 200 TABLET ORAL at 08:25

## 2024-05-04 RX ADMIN — SODIUM CHLORIDE 1 G: 1 TABLET ORAL at 08:25

## 2024-05-04 RX ADMIN — FUROSEMIDE 40 MG: 40 TABLET ORAL at 12:19

## 2024-05-04 RX ADMIN — FUROSEMIDE 20 MG: 20 TABLET ORAL at 08:24

## 2024-05-04 RX ADMIN — SODIUM CHLORIDE 1 G: 1 TABLET ORAL at 12:19

## 2024-05-04 RX ADMIN — METOPROLOL TARTRATE 50 MG: 50 TABLET, FILM COATED ORAL at 08:25

## 2024-05-04 RX ADMIN — SODIUM ZIRCONIUM CYCLOSILICATE 10 G: 10 POWDER, FOR SUSPENSION ORAL at 08:24

## 2024-05-04 RX ADMIN — FUROSEMIDE 40 MG: 40 TABLET ORAL at 18:21

## 2024-05-04 RX ADMIN — INSULIN LISPRO 6 UNITS: 100 INJECTION, SOLUTION INTRAVENOUS; SUBCUTANEOUS at 17:09

## 2024-05-04 RX ADMIN — Medication 15 G: at 18:21

## 2024-05-04 RX ADMIN — INSULIN GLARGINE 10 UNITS: 100 INJECTION, SOLUTION SUBCUTANEOUS at 08:23

## 2024-05-04 RX ADMIN — DILTIAZEM HYDROCHLORIDE 30 MG: 30 TABLET, FILM COATED ORAL at 05:58

## 2024-05-04 RX ADMIN — Medication 5000 UNITS: at 08:25

## 2024-05-04 RX ADMIN — INSULIN LISPRO 6 UNITS: 100 INJECTION, SOLUTION INTRAVENOUS; SUBCUTANEOUS at 08:25

## 2024-05-04 RX ADMIN — ATORVASTATIN CALCIUM 20 MG: 20 TABLET, FILM COATED ORAL at 08:25

## 2024-05-04 RX ADMIN — INSULIN LISPRO 4 UNITS: 100 INJECTION, SOLUTION INTRAVENOUS; SUBCUTANEOUS at 08:22

## 2024-05-04 RX ADMIN — SODIUM CHLORIDE 1 G: 1 TABLET ORAL at 18:21

## 2024-05-04 RX ADMIN — BARICITINIB 2 MG: 2 TABLET, FILM COATED ORAL at 08:25

## 2024-05-04 RX ADMIN — DILTIAZEM HYDROCHLORIDE 60 MG: 30 TABLET, FILM COATED ORAL at 18:21

## 2024-05-04 RX ADMIN — METOPROLOL TARTRATE 100 MG: 50 TABLET, FILM COATED ORAL at 19:53

## 2024-05-04 ASSESSMENT — PAIN - FUNCTIONAL ASSESSMENT: PAIN_FUNCTIONAL_ASSESSMENT: PREVENTS OR INTERFERES SOME ACTIVE ACTIVITIES AND ADLS

## 2024-05-04 ASSESSMENT — PAIN SCALES - GENERAL
PAINLEVEL_OUTOF10: 3
PAINLEVEL_OUTOF10: 3
PAINLEVEL_OUTOF10: 0
PAINLEVEL_OUTOF10: 0

## 2024-05-04 ASSESSMENT — PAIN DESCRIPTION - ORIENTATION: ORIENTATION: LEFT

## 2024-05-04 ASSESSMENT — PAIN DESCRIPTION - LOCATION: LOCATION: KNEE

## 2024-05-04 ASSESSMENT — PAIN DESCRIPTION - DESCRIPTORS: DESCRIPTORS: ACHING

## 2024-05-04 NOTE — PLAN OF CARE
Problem: Discharge Planning  Goal: Discharge to home or other facility with appropriate resources  5/4/2024 0739 by Mariela Wallace RN  Outcome: Progressing  5/3/2024 2243 by Tena Grant RN  Outcome: Progressing  5/3/2024 2242 by Tena Grant RN  Outcome: Progressing  Flowsheets (Taken 5/3/2024 2018)  Discharge to home or other facility with appropriate resources: Identify barriers to discharge with patient and caregiver     Problem: Safety - Adult  Goal: Free from fall injury  5/4/2024 0739 by Mariela Wallace RN  Outcome: Progressing  5/3/2024 2243 by Tena Grant RN  Outcome: Progressing  5/3/2024 2242 by Tena Grant RN  Outcome: Progressing     Problem: ABCDS Injury Assessment  Goal: Absence of physical injury  5/4/2024 0739 by Mariela Wallace RN  Outcome: Progressing  5/3/2024 2243 by Tena Grant RN  Outcome: Progressing  5/3/2024 2242 by Tena Grant RN  Outcome: Progressing     Problem: Skin/Tissue Integrity  Goal: Absence of new skin breakdown  5/4/2024 0739 by Mariela Wallace RN  Outcome: Progressing  5/3/2024 2243 by Tena Grant RN  Outcome: Progressing  5/3/2024 2242 by Tena Grant RN  Outcome: Progressing     Problem: Pain  Goal: Verbalizes/displays adequate comfort level or baseline comfort level  5/4/2024 0739 by Mariela Wallace RN  Outcome: Progressing  5/3/2024 2243 by Tena Grant RN  Outcome: Progressing  5/3/2024 2242 by Tena Grant, RN  Outcome: Progressing  Flowsheets (Taken 5/3/2024 1200 by Elham Dupree, RN)  Verbalizes/displays adequate comfort level or baseline comfort level: Encourage patient to monitor pain and request assistance     Problem: Chronic Conditions and Co-morbidities  Goal: Patient's chronic conditions and co-morbidity symptoms are monitored and maintained or improved  5/4/2024 0739 by Mariela Wallace RN  Outcome: Progressing  5/3/2024 2243 by Tena Grant RN  Outcome:

## 2024-05-04 NOTE — PROGRESS NOTES
Hospitalist Progress Note    Patient:  Edie Cat  YOB: 1945  Date of Service: 5/4/2024  MRN: [id number]   Acct: [id number]   Primary Care Physician: Dudley Eduardo  Advance Directive: Full Code  Admit Date: 5/2/2024       Hospital Day: 2  Referring Provider: Darwin Ledesma DO    Patient Seen, Chart, Consults, Notes, Labs, Radiology studies reviewed.    Subjective:  Edie Cat is a 78 y.o. female  whom we are following for COVID-19 infection, atrial fibrillation with rapid ventricular response, hypoxemic respiratory failure, hyponatremia, mild hyperkalemia.  She is doing much better this morning.  Breathing is improved.  Heart rate is better controlled.  We will resume her antihypertensives.    Allergies:  Amoxicillin    Medicines:  Current Facility-Administered Medications   Medication Dose Route Frequency Provider Last Rate Last Admin    metoprolol tartrate (LOPRESSOR) tablet 100 mg  100 mg Oral BID Darwin Ledesma DO        insulin glargine (LANTUS) injection vial 15 Units  15 Units SubCUTAneous BID Darwin Ledesma DO        dilTIAZem (CARDIZEM) tablet 60 mg  60 mg Oral 4 times per day Darwin Ledesma DO   60 mg at 05/04/24 1219    warfarin (COUMADIN) tablet 3 mg  3 mg Oral Daily Darwin Ledesma DO        warfarin placeholder: dosing by provider   Other RX Placeholder Darwin Ledesma DO        furosemide (LASIX) tablet 40 mg  40 mg Oral BID Darwin Ledesma DO   40 mg at 05/04/24 1219    urea (URE-NA) packet 15 g  15 g Oral Daily Darwin Ledesma DO        naloxone (NARCAN) injection 0.4 mg  0.4 mg IntraVENous PRN Juan Manuel Maradiaga MD   0.4 mg at 05/03/24 0316    diclofenac sodium (VOLTAREN) 1 % gel 2 g  2 g Topical TID PRN Darwin Ledesma DO   2 g at 05/03/24 2021    dextrose bolus 10% 125 mL  125 mL IntraVENous PRN Darwin Ledesma DO        Or    dextrose bolus 10% 250 mL  250 mL IntraVENous PRN Darwin Ledesma DO        dextrose  Systems:    Review of Systems   Constitutional:  Negative for activity change and fatigue.   HENT:  Negative for rhinorrhea and voice change.    Eyes:  Negative for visual disturbance.   Respiratory:  Negative for cough and shortness of breath.    Cardiovascular:  Negative for chest pain and leg swelling.   Gastrointestinal:  Negative for constipation, diarrhea, nausea and vomiting.   Endocrine: Negative for polyuria.   Genitourinary:  Negative for difficulty urinating and dysuria.   Musculoskeletal:  Negative for arthralgias and back pain.   Skin:  Negative for color change.   Allergic/Immunologic: Negative for immunocompromised state.   Neurological:  Negative for dizziness and headaches.   Psychiatric/Behavioral:  Negative for confusion.            Objective:  Blood pressure (!) 124/92, pulse (!) 121, temperature 97.9 °F (36.6 °C), temperature source Temporal, resp. rate 15, height 1.575 m (5' 2\"), weight 92.8 kg (204 lb 9.6 oz), SpO2 98 %.    Intake/Output Summary (Last 24 hours) at 5/4/2024 1603  Last data filed at 5/4/2024 0856  Gross per 24 hour   Intake 562.17 ml   Output 1300 ml   Net -737.83 ml       Physical Exam  Vitals and nursing note reviewed.   HENT:      Head: Normocephalic and atraumatic.      Right Ear: External ear normal.      Left Ear: External ear normal.      Nose: Nose normal.      Mouth/Throat:      Mouth: Mucous membranes are moist.   Eyes:      Conjunctiva/sclera: Conjunctivae normal.      Pupils: Pupils are equal, round, and reactive to light.   Cardiovascular:      Rate and Rhythm: Normal rate and regular rhythm.      Heart sounds: Normal heart sounds.   Pulmonary:      Effort: Pulmonary effort is normal.      Breath sounds: Normal breath sounds.   Abdominal:      General: Abdomen is flat.      Palpations: Abdomen is soft.   Musculoskeletal:         General: Normal range of motion.      Cervical back: Neck supple. No rigidity. No muscular tenderness.   Skin:     General: Skin is warm

## 2024-05-04 NOTE — PROGRESS NOTES
Pt's accucheck glucose levels have remained elevated. Pt receiving SS humalog and scheduled Humalog with meals. Dr Ledesma notified . Electronically signed by Kiera Henry RN on 5/3/2024 at 7:53 PM

## 2024-05-04 NOTE — PLAN OF CARE
Problem: Discharge Planning  Goal: Discharge to home or other facility with appropriate resources  5/3/2024 2243 by Tena Grant RN  Outcome: Progressing  5/3/2024 2242 by Tena Grant RN  Outcome: Progressing  Flowsheets (Taken 5/3/2024 2018)  Discharge to home or other facility with appropriate resources: Identify barriers to discharge with patient and caregiver     Problem: Safety - Adult  Goal: Free from fall injury  5/3/2024 2243 by Tena Grant RN  Outcome: Progressing  5/3/2024 2242 by Tena Grant RN  Outcome: Progressing     Problem: ABCDS Injury Assessment  Goal: Absence of physical injury  5/3/2024 2243 by Tena Grant RN  Outcome: Progressing  5/3/2024 2242 by Tena Grant RN  Outcome: Progressing     Problem: Skin/Tissue Integrity  Goal: Absence of new skin breakdown  Description: 1.  Monitor for areas of redness and/or skin breakdown  2.  Assess vascular access sites hourly  3.  Every 4-6 hours minimum:  Change oxygen saturation probe site  4.  Every 4-6 hours:  If on nasal continuous positive airway pressure, respiratory therapy assess nares and determine need for appliance change or resting period.  5/3/2024 2243 by Tena Grant RN  Outcome: Progressing  5/3/2024 2242 by Tena Grant RN  Outcome: Progressing     Problem: Pain  Goal: Verbalizes/displays adequate comfort level or baseline comfort level  5/3/2024 2243 by Tena Grant, RN  Outcome: Progressing  5/3/2024 2242 by Tena Grant RN  Outcome: Progressing  Flowsheets (Taken 5/3/2024 1200 by Elham Dupree, RN)  Verbalizes/displays adequate comfort level or baseline comfort level: Encourage patient to monitor pain and request assistance     Problem: Chronic Conditions and Co-morbidities  Goal: Patient's chronic conditions and co-morbidity symptoms are monitored and maintained or improved  5/3/2024 2243 by Tena Grant RN  Outcome: Progressing  5/3/2024

## 2024-05-04 NOTE — PLAN OF CARE
Problem: Discharge Planning  Goal: Discharge to home or other facility with appropriate resources  Outcome: Progressing  Flowsheets (Taken 5/3/2024 2018)  Discharge to home or other facility with appropriate resources: Identify barriers to discharge with patient and caregiver     Problem: Safety - Adult  Goal: Free from fall injury  Outcome: Progressing     Problem: ABCDS Injury Assessment  Goal: Absence of physical injury  Outcome: Progressing     Problem: Skin/Tissue Integrity  Goal: Absence of new skin breakdown  Description: 1.  Monitor for areas of redness and/or skin breakdown  2.  Assess vascular access sites hourly  3.  Every 4-6 hours minimum:  Change oxygen saturation probe site  4.  Every 4-6 hours:  If on nasal continuous positive airway pressure, respiratory therapy assess nares and determine need for appliance change or resting period.  Outcome: Progressing     Problem: Pain  Goal: Verbalizes/displays adequate comfort level or baseline comfort level  Outcome: Progressing  Flowsheets (Taken 5/3/2024 1200 by Elham Dupree RN)  Verbalizes/displays adequate comfort level or baseline comfort level: Encourage patient to monitor pain and request assistance     Problem: Chronic Conditions and Co-morbidities  Goal: Patient's chronic conditions and co-morbidity symptoms are monitored and maintained or improved  Outcome: Progressing  Flowsheets (Taken 5/3/2024 2018)  Care Plan - Patient's Chronic Conditions and Co-Morbidity Symptoms are Monitored and Maintained or Improved: Monitor and assess patient's chronic conditions and comorbid symptoms for stability, deterioration, or improvement

## 2024-05-04 NOTE — PROGRESS NOTES
Patients daughter states they would like to try to go to rehab at [facility]. Electronically signed by Mariela Wallace RN on 5/4/2024 at 10:10 AM

## 2024-05-04 NOTE — PROGRESS NOTES
Pt states feeling better. States minimally feeling SOA. Pt does have forceful exhalations. Sats 97-99% on 2L NC O2. HR afib 73 at present. /89. Pulses doppled pedal. Pt has Purewick in place with clear yellow urine present in cannister. Pt is oriented to self, place, month and that she has been SOA. Electronically signed by Kiera Henry RN on 5/3/2024 at 7:51 PM

## 2024-05-05 LAB
ALBUMIN SERPL-MCNC: 3.3 G/DL (ref 3.5–5.2)
ALP SERPL-CCNC: 94 U/L (ref 35–104)
ALT SERPL-CCNC: 17 U/L (ref 5–33)
ANION GAP SERPL CALCULATED.3IONS-SCNC: 12 MMOL/L (ref 7–19)
AST SERPL-CCNC: 18 U/L (ref 5–32)
BASOPHILS # BLD: 0 K/UL (ref 0–0.2)
BASOPHILS NFR BLD: 0.2 % (ref 0–1)
BILIRUB SERPL-MCNC: 0.3 MG/DL (ref 0.2–1.2)
BUN SERPL-MCNC: 60 MG/DL (ref 8–23)
CALCIUM SERPL-MCNC: 8.5 MG/DL (ref 8.8–10.2)
CHLORIDE SERPL-SCNC: 84 MMOL/L (ref 98–111)
CO2 SERPL-SCNC: 23 MMOL/L (ref 22–29)
CREAT SERPL-MCNC: 1.3 MG/DL (ref 0.5–0.9)
EOSINOPHIL # BLD: 0 K/UL (ref 0–0.6)
EOSINOPHIL NFR BLD: 0.1 % (ref 0–5)
ERYTHROCYTE [DISTWIDTH] IN BLOOD BY AUTOMATED COUNT: 14.6 % (ref 11.5–14.5)
GLUCOSE BLD-MCNC: 130 MG/DL (ref 70–99)
GLUCOSE BLD-MCNC: 181 MG/DL (ref 70–99)
GLUCOSE BLD-MCNC: 189 MG/DL (ref 70–99)
GLUCOSE BLD-MCNC: 288 MG/DL (ref 70–99)
GLUCOSE SERPL-MCNC: 178 MG/DL (ref 74–109)
HCT VFR BLD AUTO: 35.9 % (ref 37–47)
HGB BLD-MCNC: 10.7 G/DL (ref 12–16)
IMM GRANULOCYTES # BLD: 0.1 K/UL
INR PPP: 1.18 (ref 0.88–1.18)
LYMPHOCYTES # BLD: 3 K/UL (ref 1.1–4.5)
LYMPHOCYTES NFR BLD: 15.9 % (ref 20–40)
MCH RBC QN AUTO: 25.8 PG (ref 27–31)
MCHC RBC AUTO-ENTMCNC: 29.8 G/DL (ref 33–37)
MCV RBC AUTO: 86.7 FL (ref 81–99)
MONOCYTES # BLD: 1 K/UL (ref 0–0.9)
MONOCYTES NFR BLD: 5.2 % (ref 0–10)
NEUTROPHILS # BLD: 14.6 K/UL (ref 1.5–7.5)
NEUTS SEG NFR BLD: 78 % (ref 50–65)
PERFORMED ON: ABNORMAL
PLATELET # BLD AUTO: 427 K/UL (ref 130–400)
PMV BLD AUTO: 9 FL (ref 9.4–12.3)
POTASSIUM SERPL-SCNC: 4.6 MMOL/L (ref 3.5–5)
PROT SERPL-MCNC: 6.9 G/DL (ref 6.6–8.7)
PROTHROMBIN TIME: 14.6 SEC (ref 12–14.6)
RBC # BLD AUTO: 4.14 M/UL (ref 4.2–5.4)
SODIUM SERPL-SCNC: 119 MMOL/L (ref 136–145)
SODIUM SERPL-SCNC: 121 MMOL/L (ref 136–145)
SODIUM SERPL-SCNC: 122 MMOL/L (ref 136–145)
WBC # BLD AUTO: 18.8 K/UL (ref 4.8–10.8)

## 2024-05-05 PROCEDURE — 94760 N-INVAS EAR/PLS OXIMETRY 1: CPT

## 2024-05-05 PROCEDURE — 1200000000 HC SEMI PRIVATE

## 2024-05-05 PROCEDURE — 6370000000 HC RX 637 (ALT 250 FOR IP): Performed by: INTERNAL MEDICINE

## 2024-05-05 PROCEDURE — 82962 GLUCOSE BLOOD TEST: CPT

## 2024-05-05 PROCEDURE — 36415 COLL VENOUS BLD VENIPUNCTURE: CPT

## 2024-05-05 PROCEDURE — 80053 COMPREHEN METABOLIC PANEL: CPT

## 2024-05-05 PROCEDURE — 85610 PROTHROMBIN TIME: CPT

## 2024-05-05 PROCEDURE — 2700000000 HC OXYGEN THERAPY PER DAY

## 2024-05-05 PROCEDURE — 85025 COMPLETE CBC W/AUTO DIFF WBC: CPT

## 2024-05-05 PROCEDURE — 84295 ASSAY OF SERUM SODIUM: CPT

## 2024-05-05 RX ORDER — TOLVAPTAN 15 MG/1
15 TABLET ORAL ONCE
Status: COMPLETED | OUTPATIENT
Start: 2024-05-05 | End: 2024-05-05

## 2024-05-05 RX ORDER — METOPROLOL TARTRATE 50 MG/1
150 TABLET, FILM COATED ORAL 2 TIMES DAILY
Status: DISCONTINUED | OUTPATIENT
Start: 2024-05-05 | End: 2024-05-09 | Stop reason: ALTCHOICE

## 2024-05-05 RX ADMIN — WARFARIN SODIUM 3 MG: 2 TABLET ORAL at 17:32

## 2024-05-05 RX ADMIN — Medication 5000 UNITS: at 09:11

## 2024-05-05 RX ADMIN — AMIODARONE HYDROCHLORIDE 200 MG: 200 TABLET ORAL at 09:11

## 2024-05-05 RX ADMIN — METOPROLOL TARTRATE 100 MG: 50 TABLET, FILM COATED ORAL at 09:11

## 2024-05-05 RX ADMIN — INSULIN LISPRO 6 UNITS: 100 INJECTION, SOLUTION INTRAVENOUS; SUBCUTANEOUS at 09:11

## 2024-05-05 RX ADMIN — INSULIN GLARGINE 15 UNITS: 100 INJECTION, SOLUTION SUBCUTANEOUS at 09:12

## 2024-05-05 RX ADMIN — SENNOSIDES, DOCUSATE SODIUM 1 TABLET: 8.6; 5 TABLET ORAL at 09:11

## 2024-05-05 RX ADMIN — Medication 15 G: at 13:14

## 2024-05-05 RX ADMIN — METOPROLOL TARTRATE 150 MG: 50 TABLET, FILM COATED ORAL at 21:25

## 2024-05-05 RX ADMIN — SODIUM CHLORIDE 1 G: 1 TABLET ORAL at 09:11

## 2024-05-05 RX ADMIN — DILTIAZEM HYDROCHLORIDE 60 MG: 30 TABLET, FILM COATED ORAL at 00:00

## 2024-05-05 RX ADMIN — DILTIAZEM HYDROCHLORIDE 60 MG: 30 TABLET, FILM COATED ORAL at 17:32

## 2024-05-05 RX ADMIN — DILTIAZEM HYDROCHLORIDE 60 MG: 30 TABLET, FILM COATED ORAL at 06:27

## 2024-05-05 RX ADMIN — INSULIN LISPRO 6 UNITS: 100 INJECTION, SOLUTION INTRAVENOUS; SUBCUTANEOUS at 17:32

## 2024-05-05 RX ADMIN — INSULIN GLARGINE 15 UNITS: 100 INJECTION, SOLUTION SUBCUTANEOUS at 21:23

## 2024-05-05 RX ADMIN — SODIUM CHLORIDE 1 G: 1 TABLET ORAL at 13:14

## 2024-05-05 RX ADMIN — DILTIAZEM HYDROCHLORIDE 60 MG: 30 TABLET, FILM COATED ORAL at 13:14

## 2024-05-05 RX ADMIN — INSULIN LISPRO 6 UNITS: 100 INJECTION, SOLUTION INTRAVENOUS; SUBCUTANEOUS at 13:15

## 2024-05-05 RX ADMIN — ATORVASTATIN CALCIUM 20 MG: 20 TABLET, FILM COATED ORAL at 09:11

## 2024-05-05 RX ADMIN — INSULIN LISPRO 4 UNITS: 100 INJECTION, SOLUTION INTRAVENOUS; SUBCUTANEOUS at 13:15

## 2024-05-05 RX ADMIN — FUROSEMIDE 40 MG: 40 TABLET ORAL at 09:11

## 2024-05-05 RX ADMIN — TOLVAPTAN 15 MG: 15 TABLET ORAL at 17:33

## 2024-05-05 RX ADMIN — DONEPEZIL HYDROCHLORIDE 10 MG: 10 TABLET, FILM COATED ORAL at 21:23

## 2024-05-05 ASSESSMENT — PAIN SCALES - GENERAL
PAINLEVEL_OUTOF10: 0
PAINLEVEL_OUTOF10: 0

## 2024-05-05 NOTE — PLAN OF CARE
Problem: Discharge Planning  Goal: Discharge to home or other facility with appropriate resources  Outcome: Progressing  Flowsheets (Taken 5/4/2024 2140)  Discharge to home or other facility with appropriate resources:   Identify barriers to discharge with patient and caregiver   Arrange for needed discharge resources and transportation as appropriate   Identify discharge learning needs (meds, wound care, etc)   Refer to discharge planning if patient needs post-hospital services based on physician order or complex needs related to functional status, cognitive ability or social support system     Problem: Safety - Adult  Goal: Free from fall injury  Outcome: Progressing  Flowsheets (Taken 5/4/2024 2340)  Free From Fall Injury: Instruct family/caregiver on patient safety     Problem: ABCDS Injury Assessment  Goal: Absence of physical injury  Outcome: Progressing  Flowsheets (Taken 5/4/2024 2340)  Absence of Physical Injury: Implement safety measures based on patient assessment     Problem: Skin/Tissue Integrity  Goal: Absence of new skin breakdown  Description:  Monitor for areas of redness and/or skin breakdown    Outcome: Progressing     Problem: Pain  Goal: Verbalizes/displays adequate comfort level or baseline comfort level  Outcome: Progressing  Flowsheets (Taken 5/3/2024 1200 by Elham Dupree RN)  Verbalizes/displays adequate comfort level or baseline comfort level: Encourage patient to monitor pain and request assistance     Problem: Chronic Conditions and Co-morbidities  Goal: Patient's chronic conditions and co-morbidity symptoms are monitored and maintained or improved  Outcome: Progressing  Flowsheets (Taken 5/4/2024 2140)  Care Plan - Patient's Chronic Conditions and Co-Morbidity Symptoms are Monitored and Maintained or Improved:   Monitor and assess patient's chronic conditions and comorbid symptoms for stability, deterioration, or improvement   Collaborate with multidisciplinary team to address  INTERVENTIONS:  1. Assess for possible contributors to thought disturbance, including medications, impaired vision or hearing, underlying metabolic abnormalities, dehydration, psychiatric diagnoses, and notify attending LIP  2. Willard high risk fall precautions, as indicated  3. Provide frequent short contacts to provide reality reorientation, refocusing and direction  4. Decrease environmental stimuli, including noise as appropriate  5. Monitor and intervene to maintain adequate nutrition, hydration, elimination, sleep and activity  6. If unable to ensure safety without constant attention obtain sitter and review sitter guidelines with assigned personnel  7. Initiate Psychosocial CNS and Spiritual Care consult, as indicated  Outcome: Progressing  Flowsheets (Taken 5/4/2024 2140)  Effect of thought disturbance (confusion, delirium, dementia, or psychosis) are managed with adequate functional status:   Assess for contributors to thought disturbance, including medications, impaired vision or hearing, underlying metabolic abnormalities, dehydration, psychiatric diagnoses, notify LIP   Willard high risk fall precautions, as indicated   Provide frequent short contacts to provide reality reorientation, refocusing and direction   Decrease environmental stimuli, including noise as appropriate   Monitor and intervene to maintain adequate nutrition, hydration, elimination, sleep and activity   If unable to ensure safety without constant attention obtain sitter and review sitter guidelines with assigned personnel

## 2024-05-05 NOTE — PROGRESS NOTES
Hospitalist Progress Note    Patient:  Edie Cat  YOB: 1945  Date of Service: 5/5/2024  MRN: [id number]   Acct: [id number]   Primary Care Physician: Dudley Eduardo  Advance Directive: Full Code  Admit Date: 5/2/2024       Hospital Day: 3  Referring Provider: Darwin Ledesma DO    Patient Seen, Chart, Consults, Notes, Labs, Radiology studies reviewed.    Subjective:  Edie Cat is a 78 y.o. female  whom we are following for COVID-19 infection, atrial fibrillation with rapid ventricular response, hypoxemic respiratory failure, hyponatremia, mild hyperkalemia.  She is doing much better this morning.  Breathing is improved.  Heart rate is better controlled.  We will resume her antihypertensives.  She still remains hyponatremic and spite of maximal conservative measures.  I will give a regimen of Vaprisol to help with correction.    Allergies:  Amoxicillin    Medicines:  Current Facility-Administered Medications   Medication Dose Route Frequency Provider Last Rate Last Admin    urea (URE-NA) packet 15 g  15 g Oral Daily Darwin Ledesma DO   15 g at 05/05/24 1314    conivaptan (VAPRISOL) 20 mg in dextrose 5% bolus  20 mg IntraVENous Once Darwin Ledesma DO        And    conivaptan (VAPRISOL) 20 mg in dextrose 5% 100 mL infusion  20 mg/day IntraVENous Continuous Darwin Ledesma DO        metoprolol tartrate (LOPRESSOR) tablet 100 mg  100 mg Oral BID Darwin Ledesma DO   100 mg at 05/05/24 0911    insulin glargine (LANTUS) injection vial 15 Units  15 Units SubCUTAneous BID Darwin Ledesma DO   15 Units at 05/05/24 0912    dilTIAZem (CARDIZEM) tablet 60 mg  60 mg Oral 4 times per day Darwin Ledesma DO   60 mg at 05/05/24 1314    warfarin (COUMADIN) tablet 3 mg  3 mg Oral Daily Darwin Ledesma DO   3 mg at 05/04/24 1821    warfarin placeholder: dosing by provider   Other RX Placeholder Darwin Ledesma DO        furosemide (LASIX) tablet 40 mg  40 mg

## 2024-05-06 LAB
ALBUMIN SERPL-MCNC: 3.2 G/DL (ref 3.5–5.2)
ALP SERPL-CCNC: 88 U/L (ref 35–104)
ALT SERPL-CCNC: 18 U/L (ref 5–33)
ANION GAP SERPL CALCULATED.3IONS-SCNC: 12 MMOL/L (ref 7–19)
AST SERPL-CCNC: 16 U/L (ref 5–32)
BASOPHILS # BLD: 0 K/UL (ref 0–0.2)
BASOPHILS NFR BLD: 0.3 % (ref 0–1)
BILIRUB SERPL-MCNC: <0.2 MG/DL (ref 0.2–1.2)
BUN SERPL-MCNC: 72 MG/DL (ref 8–23)
CALCIUM SERPL-MCNC: 8.6 MG/DL (ref 8.8–10.2)
CHLORIDE SERPL-SCNC: 91 MMOL/L (ref 98–111)
CO2 SERPL-SCNC: 26 MMOL/L (ref 22–29)
CREAT SERPL-MCNC: 1.3 MG/DL (ref 0.5–0.9)
EOSINOPHIL # BLD: 0.2 K/UL (ref 0–0.6)
EOSINOPHIL NFR BLD: 1.5 % (ref 0–5)
ERYTHROCYTE [DISTWIDTH] IN BLOOD BY AUTOMATED COUNT: 14.4 % (ref 11.5–14.5)
GLUCOSE BLD-MCNC: 100 MG/DL (ref 70–99)
GLUCOSE BLD-MCNC: 176 MG/DL (ref 70–99)
GLUCOSE BLD-MCNC: 192 MG/DL (ref 70–99)
GLUCOSE BLD-MCNC: 239 MG/DL (ref 70–99)
GLUCOSE SERPL-MCNC: 108 MG/DL (ref 74–109)
HCT VFR BLD AUTO: 31.7 % (ref 37–47)
HGB BLD-MCNC: 10.3 G/DL (ref 12–16)
IMM GRANULOCYTES # BLD: 0.1 K/UL
INR PPP: 1.18 (ref 0.88–1.18)
LYMPHOCYTES # BLD: 3.5 K/UL (ref 1.1–4.5)
LYMPHOCYTES NFR BLD: 25.7 % (ref 20–40)
MAGNESIUM SERPL-MCNC: 1.9 MG/DL (ref 1.6–2.4)
MCH RBC QN AUTO: 25.3 PG (ref 27–31)
MCHC RBC AUTO-ENTMCNC: 32.5 G/DL (ref 33–37)
MCV RBC AUTO: 77.9 FL (ref 81–99)
MONOCYTES # BLD: 0.9 K/UL (ref 0–0.9)
MONOCYTES NFR BLD: 6.6 % (ref 0–10)
NEUTROPHILS # BLD: 9 K/UL (ref 1.5–7.5)
NEUTS SEG NFR BLD: 65.2 % (ref 50–65)
PERFORMED ON: ABNORMAL
PHOSPHATE SERPL-MCNC: 4.6 MG/DL (ref 2.5–4.5)
PLATELET # BLD AUTO: 413 K/UL (ref 130–400)
PMV BLD AUTO: 8.8 FL (ref 9.4–12.3)
POTASSIUM SERPL-SCNC: 4.4 MMOL/L (ref 3.5–5)
PROT SERPL-MCNC: 6.7 G/DL (ref 6.6–8.7)
PROTHROMBIN TIME: 14.7 SEC (ref 12–14.6)
RBC # BLD AUTO: 4.07 M/UL (ref 4.2–5.4)
SODIUM SERPL-SCNC: 129 MMOL/L (ref 136–145)
SODIUM SERPL-SCNC: 129 MMOL/L (ref 136–145)
SODIUM SERPL-SCNC: 133 MMOL/L (ref 136–145)
WBC # BLD AUTO: 13.8 K/UL (ref 4.8–10.8)

## 2024-05-06 PROCEDURE — 84100 ASSAY OF PHOSPHORUS: CPT

## 2024-05-06 PROCEDURE — 2700000000 HC OXYGEN THERAPY PER DAY

## 2024-05-06 PROCEDURE — 94760 N-INVAS EAR/PLS OXIMETRY 1: CPT

## 2024-05-06 PROCEDURE — 83735 ASSAY OF MAGNESIUM: CPT

## 2024-05-06 PROCEDURE — 85025 COMPLETE CBC W/AUTO DIFF WBC: CPT

## 2024-05-06 PROCEDURE — 36415 COLL VENOUS BLD VENIPUNCTURE: CPT

## 2024-05-06 PROCEDURE — 1200000000 HC SEMI PRIVATE

## 2024-05-06 PROCEDURE — 80053 COMPREHEN METABOLIC PANEL: CPT

## 2024-05-06 PROCEDURE — 85610 PROTHROMBIN TIME: CPT

## 2024-05-06 PROCEDURE — 84295 ASSAY OF SERUM SODIUM: CPT

## 2024-05-06 PROCEDURE — 6370000000 HC RX 637 (ALT 250 FOR IP): Performed by: INTERNAL MEDICINE

## 2024-05-06 PROCEDURE — 82962 GLUCOSE BLOOD TEST: CPT

## 2024-05-06 RX ADMIN — DILTIAZEM HYDROCHLORIDE 60 MG: 30 TABLET, FILM COATED ORAL at 12:10

## 2024-05-06 RX ADMIN — METOPROLOL TARTRATE 150 MG: 50 TABLET, FILM COATED ORAL at 21:04

## 2024-05-06 RX ADMIN — Medication 5000 UNITS: at 10:08

## 2024-05-06 RX ADMIN — AMIODARONE HYDROCHLORIDE 200 MG: 200 TABLET ORAL at 10:08

## 2024-05-06 RX ADMIN — DILTIAZEM HYDROCHLORIDE 60 MG: 30 TABLET, FILM COATED ORAL at 00:15

## 2024-05-06 RX ADMIN — INSULIN LISPRO 6 UNITS: 100 INJECTION, SOLUTION INTRAVENOUS; SUBCUTANEOUS at 10:29

## 2024-05-06 RX ADMIN — ATORVASTATIN CALCIUM 20 MG: 20 TABLET, FILM COATED ORAL at 10:08

## 2024-05-06 RX ADMIN — ACETAMINOPHEN 650 MG: 325 TABLET ORAL at 12:10

## 2024-05-06 RX ADMIN — APIXABAN 5 MG: 5 TABLET, FILM COATED ORAL at 21:08

## 2024-05-06 RX ADMIN — DILTIAZEM HYDROCHLORIDE 60 MG: 30 TABLET, FILM COATED ORAL at 17:29

## 2024-05-06 RX ADMIN — INSULIN GLARGINE 15 UNITS: 100 INJECTION, SOLUTION SUBCUTANEOUS at 21:08

## 2024-05-06 RX ADMIN — SENNOSIDES, DOCUSATE SODIUM 1 TABLET: 8.6; 5 TABLET ORAL at 10:08

## 2024-05-06 RX ADMIN — INSULIN GLARGINE 15 UNITS: 100 INJECTION, SOLUTION SUBCUTANEOUS at 10:29

## 2024-05-06 RX ADMIN — DONEPEZIL HYDROCHLORIDE 10 MG: 10 TABLET, FILM COATED ORAL at 21:05

## 2024-05-06 RX ADMIN — METOPROLOL TARTRATE 150 MG: 50 TABLET, FILM COATED ORAL at 10:08

## 2024-05-06 RX ADMIN — DILTIAZEM HYDROCHLORIDE 60 MG: 30 TABLET, FILM COATED ORAL at 05:39

## 2024-05-06 NOTE — PROGRESS NOTES
Daughter Lulú inquired about pt home medications being resumed, questioned the use of coumadin, as her mother has been off that medication for several years now. Per daughter, there was some difficulty regulating PT-INR during prior outpatient treatment, and the drug was ultimately discontinued.  Family asked if alternatives were available. Lulú also states that her mother is diagnosed with dementia.   Physician notified.   Social work attempting to get pt approved for SNF placement.

## 2024-05-06 NOTE — CARE COORDINATION
Referrals made to the following, waiting on offer  [facility] can accept   [phone] P  [phone] E-fax  [facility]  [phone] P  [phone] F  Electronically signed by MARION Esposito on 5/6/2024 at 9:42 AM

## 2024-05-06 NOTE — PLAN OF CARE
Problem: Discharge Planning  Goal: Discharge to home or other facility with appropriate resources  Outcome: Progressing  Flowsheets (Taken 5/5/2024 2125)  Discharge to home or other facility with appropriate resources:   Identify barriers to discharge with patient and caregiver   Arrange for needed discharge resources and transportation as appropriate   Identify discharge learning needs (meds, wound care, etc)   Refer to discharge planning if patient needs post-hospital services based on physician order or complex needs related to functional status, cognitive ability or social support system     Problem: Safety - Adult  Goal: Free from fall injury  Outcome: Progressing  Flowsheets (Taken 5/6/2024 0003)  Free From Fall Injury: Instruct family/caregiver on patient safety     Problem: ABCDS Injury Assessment  Goal: Absence of physical injury  Outcome: Progressing  Flowsheets (Taken 5/6/2024 0003)  Absence of Physical Injury: Implement safety measures based on patient assessment     Problem: Skin/Tissue Integrity  Goal: Absence of new skin breakdown  Description:  Monitor for areas of redness and/or skin breakdown    Outcome: Progressing     Problem: Pain  Goal: Verbalizes/displays adequate comfort level or baseline comfort level  Outcome: Progressing  Flowsheets (Taken 5/5/2024 2124)  Verbalizes/displays adequate comfort level or baseline comfort level:   Encourage patient to monitor pain and request assistance   Assess pain using appropriate pain scale   Administer analgesics based on type and severity of pain and evaluate response   Implement non-pharmacological measures as appropriate and evaluate response   Consider cultural and social influences on pain and pain management   Notify Licensed Independent Practitioner if interventions unsuccessful or patient reports new pain     Problem: Chronic Conditions and Co-morbidities  Goal: Patient's chronic conditions and co-morbidity symptoms are monitored and maintained

## 2024-05-06 NOTE — PROGRESS NOTES
Hospitalist Progress Note    Patient:  Edie Cat  YOB: 1945  Date of Service: 5/6/2024  MRN: [id number]   Acct: [id number]   Primary Care Physician: Dudley Eduardo  Advance Directive: Full Code  Admit Date: 5/2/2024       Hospital Day: 4  Referring Provider: Darwin Ledesma DO    Patient Seen, Chart, Consults, Notes, Labs, Radiology studies reviewed.    Subjective:  Edie Cat is a 78 y.o. female  whom we are following for COVID-19 infection, atrial fibrillation with rapid ventricular response, hypoxemic respiratory failure, hyponatremia, mild hyperkalemia.   She continues to improve, but is still very debilitated and unable to return home.   is working on SNF placement.    Allergies:  Amoxicillin    Medicines:  Current Facility-Administered Medications   Medication Dose Route Frequency Provider Last Rate Last Admin    apixaban (ELIQUIS) tablet 5 mg  5 mg Oral BID Darwin Ledesma DO        metoprolol tartrate (LOPRESSOR) tablet 150 mg  150 mg Oral BID Darwin Ledesma DO   150 mg at 05/06/24 1008    insulin glargine (LANTUS) injection vial 15 Units  15 Units SubCUTAneous BID Darwin Ledesma DO   15 Units at 05/06/24 1029    dilTIAZem (CARDIZEM) tablet 60 mg  60 mg Oral 4 times per day Darwin Ledesma DO   60 mg at 05/06/24 1210    naloxone (NARCAN) injection 0.4 mg  0.4 mg IntraVENous PRN Juan Manuel Maradiaga MD   0.4 mg at 05/03/24 0316    diclofenac sodium (VOLTAREN) 1 % gel 2 g  2 g Topical TID PRN Darwin Ledesma DO   2 g at 05/03/24 2021    dextrose bolus 10% 125 mL  125 mL IntraVENous PRN Darwin Ledesma DO        Or    dextrose bolus 10% 250 mL  250 mL IntraVENous PRN Darwin Ledesma DO        dextrose 10 % infusion   IntraVENous Continuous PRN Darwin Ledesma DO        insulin lispro (HUMALOG) injection vial 6 Units  6 Units SubCUTAneous TID WC Darwin Ledesma DO   6 Units at 05/06/24 1029    vitamin D (ERGOCALCIFEROL)  \"CXSURG\" in the last 72 hours.    Radiology reports as per the Radiologist  Radiology: XR CHEST PORTABLE    Result Date: 5/3/2024  EXAM:  AP CHEST.  HISTORY:  Labored breathing.  Comparison: Chest x-ray of 05/02/2024.  FINDINGS: The bones are unremarkable.  The cardiac silhouette is enlarged.  There are decreased left lung and stable right lung airspace opacities.  There is eventration of the right hemidiaphragm.      Impression:  Bilateral airspace opacities as described, consistent with edema versus infection.  Cardiomegaly.     ______________________________________ Electronically signed by: CHASTITY ROACH M.D. Date:     05/03/2024 Time:    04:11        Assessment     COVID-19.  Minimal symptoms.  Continue medical management.     Hyponatremia.  Samsca dosed 5/6.     Atrial fibrillation with rapid ventricular response.  Rate control and anticoagulation.  Continue oral diltiazem.  Continue amiodarone.  Change to Eliquis for ease on therapy.      Darwin Ledesma, DO

## 2024-05-06 NOTE — CARE COORDINATION
CM met with pieter Chino, to f/u on history. Pt does have dx of Dementia, and is taking Aricept. Pt has had \"[facility]\" [facility]. Presently, dylan is living w/patient. Dtr states, she has been told patient cannot live by herself. Pt is Legally Blind. Pt was at [facility] about one year ago and stayed 21 days, pt could not afford copay. Pt was walking w/walker but not is hardly ambulating. Pt dx with COVID 5/2. At this time, will await therapy notes.   CM will f/u w/[facility] for Provider and any additional benefits pt may have through [facility]  Electronically signed by Cristel Gordon RN on 5/6/2024 at 5:15 PM

## 2024-05-06 NOTE — CARE COORDINATION
Case Management Assessment  Initial Evaluation    Date/Time of Evaluation: 5/6/2024 5:18 PM  Assessment Completed by: Cristel Gordon RN    If patient is discharged prior to next notation, then this note serves as note for discharge by case management.    Patient Name: Edie Cat                   YOB: 1945  Diagnosis: COVID-19 [U07.1]                   Date / Time: 5/2/2024  7:17 PM    Patient Admission Status: Inpatient   Readmission Risk (Low < 19, Mod (19-27), High > 27): Readmission Risk Score: 18.2    Current PCP: Dudley Eduardo  PCP verified by CM?  (dtr unsure of providers name)    Chart Reviewed: Yes      History Provided by: Child/Family  Patient Orientation: Alert and Oriented    Patient Cognition: (P) Dementia / Early Alzheimer's (pt eating breakfast, no really talkative, persmission to talk w/dtr)    Hospitalization in the last 30 days (Readmission):  No    If yes, Readmission Assessment in CM Navigator will be completed.    Advance Directives:      Code Status: Full Code   Patient's Primary Decision Maker is: Legal Next of Kin      Discharge Planning:    Patient lives with: Family Members Type of Home: House  Primary Care Giver: Family (not sure of PCP name)  Patient Support Systems include: Children, Family Members   Current Financial resources: Medicare, [place] ([facility])  Current community resources: (P)  ([facility] at Home through [facility])  Current services prior to admission: None            Current DME:              Type of Home Care services:  (P) None    ADLS  Prior functional level: Assistance with the following:  Current functional level: Assistance with the following:    PT AM-PAC:   /24  OT AM-PAC:   /24    Family can provide assistance at DC: Yes (granddtr lives with patient)  Would you like Case Management to discuss the discharge plan with any other family members/significant others, and if so, who?    Plans to Return to Present Housing: Unknown at present  Other Identified

## 2024-05-07 ENCOUNTER — APPOINTMENT (OUTPATIENT)
Dept: GENERAL RADIOLOGY | Age: 79
End: 2024-05-07
Attending: INTERNAL MEDICINE
Payer: MEDICARE

## 2024-05-07 PROBLEM — U07.1 COVID-19: Status: RESOLVED | Noted: 2024-05-02 | Resolved: 2024-05-07

## 2024-05-07 LAB
ALBUMIN SERPL-MCNC: 3.2 G/DL (ref 3.5–5.2)
ALP SERPL-CCNC: 93 U/L (ref 35–104)
ALT SERPL-CCNC: 20 U/L (ref 5–33)
ANION GAP SERPL CALCULATED.3IONS-SCNC: 10 MMOL/L (ref 7–19)
AST SERPL-CCNC: 15 U/L (ref 5–32)
BASOPHILS # BLD: 0.1 K/UL (ref 0–0.2)
BASOPHILS NFR BLD: 0.6 % (ref 0–1)
BILIRUB SERPL-MCNC: <0.2 MG/DL (ref 0.2–1.2)
BUN SERPL-MCNC: 59 MG/DL (ref 8–23)
CALCIUM SERPL-MCNC: 8.6 MG/DL (ref 8.8–10.2)
CHLORIDE SERPL-SCNC: 93 MMOL/L (ref 98–111)
CO2 SERPL-SCNC: 28 MMOL/L (ref 22–29)
CREAT SERPL-MCNC: 1.3 MG/DL (ref 0.5–0.9)
EOSINOPHIL # BLD: 0.6 K/UL (ref 0–0.6)
EOSINOPHIL NFR BLD: 5 % (ref 0–5)
ERYTHROCYTE [DISTWIDTH] IN BLOOD BY AUTOMATED COUNT: 14.6 % (ref 11.5–14.5)
GLUCOSE BLD-MCNC: 134 MG/DL (ref 70–99)
GLUCOSE BLD-MCNC: 182 MG/DL (ref 70–99)
GLUCOSE BLD-MCNC: 268 MG/DL (ref 70–99)
GLUCOSE BLD-MCNC: 372 MG/DL (ref 70–99)
GLUCOSE SERPL-MCNC: 156 MG/DL (ref 74–109)
HCT VFR BLD AUTO: 36 % (ref 37–47)
HGB BLD-MCNC: 11.2 G/DL (ref 12–16)
IMM GRANULOCYTES # BLD: 0.1 K/UL
INR PPP: 1.61 (ref 0.88–1.18)
LYMPHOCYTES # BLD: 2.9 K/UL (ref 1.1–4.5)
LYMPHOCYTES NFR BLD: 25 % (ref 20–40)
MAGNESIUM SERPL-MCNC: 2 MG/DL (ref 1.6–2.4)
MCH RBC QN AUTO: 25.1 PG (ref 27–31)
MCHC RBC AUTO-ENTMCNC: 31.1 G/DL (ref 33–37)
MCV RBC AUTO: 80.7 FL (ref 81–99)
MONOCYTES # BLD: 1 K/UL (ref 0–0.9)
MONOCYTES NFR BLD: 8.9 % (ref 0–10)
NEUTROPHILS # BLD: 7 K/UL (ref 1.5–7.5)
NEUTS SEG NFR BLD: 60 % (ref 50–65)
PERFORMED ON: ABNORMAL
PHOSPHATE SERPL-MCNC: 3.9 MG/DL (ref 2.5–4.5)
PLATELET # BLD AUTO: 412 K/UL (ref 130–400)
PMV BLD AUTO: 9 FL (ref 9.4–12.3)
POTASSIUM SERPL-SCNC: 4.6 MMOL/L (ref 3.5–5)
PROT SERPL-MCNC: 6.9 G/DL (ref 6.6–8.7)
PROTHROMBIN TIME: 18.7 SEC (ref 12–14.6)
RBC # BLD AUTO: 4.46 M/UL (ref 4.2–5.4)
SODIUM SERPL-SCNC: 131 MMOL/L (ref 136–145)
WBC # BLD AUTO: 11.6 K/UL (ref 4.8–10.8)

## 2024-05-07 PROCEDURE — 6370000000 HC RX 637 (ALT 250 FOR IP): Performed by: INTERNAL MEDICINE

## 2024-05-07 PROCEDURE — 83735 ASSAY OF MAGNESIUM: CPT

## 2024-05-07 PROCEDURE — 2580000003 HC RX 258: Performed by: INTERNAL MEDICINE

## 2024-05-07 PROCEDURE — 97165 OT EVAL LOW COMPLEX 30 MIN: CPT

## 2024-05-07 PROCEDURE — 80053 COMPREHEN METABOLIC PANEL: CPT

## 2024-05-07 PROCEDURE — 1200000000 HC SEMI PRIVATE

## 2024-05-07 PROCEDURE — 85025 COMPLETE CBC W/AUTO DIFF WBC: CPT

## 2024-05-07 PROCEDURE — 97535 SELF CARE MNGMENT TRAINING: CPT

## 2024-05-07 PROCEDURE — 6360000002 HC RX W HCPCS: Performed by: INTERNAL MEDICINE

## 2024-05-07 PROCEDURE — 84100 ASSAY OF PHOSPHORUS: CPT

## 2024-05-07 PROCEDURE — 94760 N-INVAS EAR/PLS OXIMETRY 1: CPT

## 2024-05-07 PROCEDURE — 85610 PROTHROMBIN TIME: CPT

## 2024-05-07 PROCEDURE — 2700000000 HC OXYGEN THERAPY PER DAY

## 2024-05-07 PROCEDURE — 97116 GAIT TRAINING THERAPY: CPT

## 2024-05-07 PROCEDURE — 97161 PT EVAL LOW COMPLEX 20 MIN: CPT

## 2024-05-07 PROCEDURE — 71045 X-RAY EXAM CHEST 1 VIEW: CPT

## 2024-05-07 PROCEDURE — 82962 GLUCOSE BLOOD TEST: CPT

## 2024-05-07 PROCEDURE — 36415 COLL VENOUS BLD VENIPUNCTURE: CPT

## 2024-05-07 PROCEDURE — 94640 AIRWAY INHALATION TREATMENT: CPT

## 2024-05-07 RX ORDER — BUDESONIDE AND FORMOTEROL FUMARATE DIHYDRATE 160; 4.5 UG/1; UG/1
2 AEROSOL RESPIRATORY (INHALATION) 2 TIMES DAILY
Qty: 10.2 G | Refills: 5 | Status: SHIPPED | OUTPATIENT
Start: 2024-05-07 | End: 2024-06-03

## 2024-05-07 RX ORDER — INSULIN GLARGINE 100 [IU]/ML
15 INJECTION, SOLUTION SUBCUTANEOUS 2 TIMES DAILY
Qty: 10 ML | Refills: 3 | Status: SHIPPED | OUTPATIENT
Start: 2024-05-07 | End: 2024-06-03

## 2024-05-07 RX ORDER — ERGOCALCIFEROL 1.25 MG/1
50000 CAPSULE ORAL WEEKLY
Qty: 5 CAPSULE | Refills: 1 | Status: SHIPPED | OUTPATIENT
Start: 2024-05-10 | End: 2024-06-03

## 2024-05-07 RX ORDER — ALBUTEROL SULFATE 90 UG/1
2 AEROSOL, METERED RESPIRATORY (INHALATION)
Status: DISCONTINUED | OUTPATIENT
Start: 2024-05-07 | End: 2024-05-07 | Stop reason: SDUPTHER

## 2024-05-07 RX ORDER — METOPROLOL TARTRATE 75 MG/1
150 TABLET, FILM COATED ORAL 2 TIMES DAILY
Qty: 60 TABLET | Refills: 3 | Status: SHIPPED | OUTPATIENT
Start: 2024-05-07 | End: 2024-06-03

## 2024-05-07 RX ORDER — IPRATROPIUM BROMIDE AND ALBUTEROL SULFATE 2.5; .5 MG/3ML; MG/3ML
1 SOLUTION RESPIRATORY (INHALATION)
Status: DISCONTINUED | OUTPATIENT
Start: 2024-05-07 | End: 2024-05-07 | Stop reason: CLARIF

## 2024-05-07 RX ORDER — BUDESONIDE AND FORMOTEROL FUMARATE DIHYDRATE 160; 4.5 UG/1; UG/1
2 AEROSOL RESPIRATORY (INHALATION)
Status: DISCONTINUED | OUTPATIENT
Start: 2024-05-07 | End: 2024-05-30

## 2024-05-07 RX ORDER — ALBUTEROL SULFATE 90 UG/1
2 AEROSOL, METERED RESPIRATORY (INHALATION)
Status: DISCONTINUED | OUTPATIENT
Start: 2024-05-07 | End: 2024-05-09

## 2024-05-07 RX ORDER — BUDESONIDE AND FORMOTEROL FUMARATE DIHYDRATE 160; 4.5 UG/1; UG/1
2 AEROSOL RESPIRATORY (INHALATION)
Status: DISCONTINUED | OUTPATIENT
Start: 2024-05-07 | End: 2024-05-07 | Stop reason: SDUPTHER

## 2024-05-07 RX ORDER — DILTIAZEM HYDROCHLORIDE 360 MG/1
360 CAPSULE, EXTENDED RELEASE ORAL DAILY
Qty: 30 CAPSULE | Refills: 1 | Status: SHIPPED | OUTPATIENT
Start: 2024-05-07 | End: 2024-06-03

## 2024-05-07 RX ORDER — AMIODARONE HYDROCHLORIDE 200 MG/1
200 TABLET ORAL DAILY
Qty: 30 TABLET | Refills: 1 | Status: SHIPPED | OUTPATIENT
Start: 2024-05-08 | End: 2024-06-03

## 2024-05-07 RX ADMIN — DILTIAZEM HYDROCHLORIDE 60 MG: 30 TABLET, FILM COATED ORAL at 05:47

## 2024-05-07 RX ADMIN — DONEPEZIL HYDROCHLORIDE 10 MG: 10 TABLET, FILM COATED ORAL at 21:01

## 2024-05-07 RX ADMIN — AMIODARONE HYDROCHLORIDE 200 MG: 200 TABLET ORAL at 10:07

## 2024-05-07 RX ADMIN — ATORVASTATIN CALCIUM 20 MG: 20 TABLET, FILM COATED ORAL at 10:07

## 2024-05-07 RX ADMIN — SENNOSIDES, DOCUSATE SODIUM 1 TABLET: 8.6; 5 TABLET ORAL at 10:07

## 2024-05-07 RX ADMIN — ALBUTEROL SULFATE 2 PUFF: 90 AEROSOL, METERED RESPIRATORY (INHALATION) at 20:10

## 2024-05-07 RX ADMIN — INSULIN GLARGINE 15 UNITS: 100 INJECTION, SOLUTION SUBCUTANEOUS at 21:05

## 2024-05-07 RX ADMIN — METOPROLOL TARTRATE 150 MG: 50 TABLET, FILM COATED ORAL at 21:01

## 2024-05-07 RX ADMIN — INSULIN LISPRO 6 UNITS: 100 INJECTION, SOLUTION INTRAVENOUS; SUBCUTANEOUS at 16:09

## 2024-05-07 RX ADMIN — WATER 125 MG: 1 INJECTION INTRAMUSCULAR; INTRAVENOUS; SUBCUTANEOUS at 18:00

## 2024-05-07 RX ADMIN — APIXABAN 5 MG: 5 TABLET, FILM COATED ORAL at 10:07

## 2024-05-07 RX ADMIN — IPRATROPIUM BROMIDE 2 PUFF: 17 AEROSOL, METERED RESPIRATORY (INHALATION) at 20:10

## 2024-05-07 RX ADMIN — APIXABAN 5 MG: 5 TABLET, FILM COATED ORAL at 21:01

## 2024-05-07 RX ADMIN — DILTIAZEM HYDROCHLORIDE 60 MG: 30 TABLET, FILM COATED ORAL at 01:58

## 2024-05-07 RX ADMIN — METOPROLOL TARTRATE 150 MG: 50 TABLET, FILM COATED ORAL at 10:07

## 2024-05-07 RX ADMIN — INSULIN LISPRO 8 UNITS: 100 INJECTION, SOLUTION INTRAVENOUS; SUBCUTANEOUS at 16:09

## 2024-05-07 RX ADMIN — Medication 5000 UNITS: at 10:07

## 2024-05-07 RX ADMIN — BUDESONIDE AND FORMOTEROL FUMARATE DIHYDRATE 2 PUFF: 160; 4.5 AEROSOL RESPIRATORY (INHALATION) at 20:15

## 2024-05-07 RX ADMIN — INSULIN GLARGINE 15 UNITS: 100 INJECTION, SOLUTION SUBCUTANEOUS at 10:07

## 2024-05-07 RX ADMIN — DILTIAZEM HYDROCHLORIDE 60 MG: 30 TABLET, FILM COATED ORAL at 16:10

## 2024-05-07 NOTE — PROGRESS NOTES
Pt details her baseline mobility, states she uses a walker in her home, and is reliant on wheelchair for longer distances.

## 2024-05-07 NOTE — PROGRESS NOTES
05/07/24 1156   Encounter Summary   Encounter Overview/Reason Follow-up   Service Provided For Patient   Referral/Consult From Rounding   Support System Family members  (Daughter)   Last Encounter  05/07/24  (Discussed state law, see ACP Notes)   Complexity of Encounter Moderate   Begin Time 1142   End Time  1156   Total Time Calculated 14 min   Crisis   Type Follow up   Spiritual/Emotional needs   Type Spiritual Support   Rituals, Rites and Sacraments   Type Blessings   Advance Care Planning   Type ACP conversation   Assessment/Intervention/Outcome   Assessment Coping  (Pt said - bit better; looking tired.)   Intervention Sustaining Presence/Ministry of presence;Explored/Affirmed feelings, thoughts, concerns  (Elited conversation on family support.)   Outcome Receptive  (Accepted prayer-offer.)   Plan and Referrals   Plan/Referrals No future visits requested     Electronically signed by CALE Neves on 5/7/2024 at 11:59 AM

## 2024-05-07 NOTE — DISCHARGE INSTRUCTIONS
Return to emergency room with worsening symptoms and shortness of breath    Take medications as prescribed    Monitor wheezing for improvement, as this may be an ongoing part of recovery from COVID-19

## 2024-05-07 NOTE — CARE COORDINATION
05/07/24 1157   IMM Letter   IMM Letter given to Patient/Family/Significant other/Guardian/POA/by: Eldon guaman   IMM Letter date given: 05/07/24   IMM Letter time given: 1151     Second IMM given to patient and explained with patient verbalizing understanding.  All questions and concerns addressed     Signed letter placed in pt soft chart  Patient declined waiting 4 hr period prior to discharge.  Electronically signed by CELENA FLYNN on 5/7/2024 at 11:57 AM

## 2024-05-07 NOTE — CASE COMMUNICATION
CM reached ou to Lulú Lewis dtr, to advise patient being transferred today to  [facility]/. Lulú has just been talking with Eneida Humphries, [facility], about home services. Karis does want copy of IMM sent to her home.  Electronically signed by Cristel Gordon RN on 5/7/2024 at 2:46 PM

## 2024-05-07 NOTE — ACP (ADVANCE CARE PLANNING)
Advance Care Planning     Advance Care Planning Inpatient Note  [facility] Department    Today's Date: 5/7/2024  Unit: [facility] 4 ONCOLOGY UNIT    Received request from rounding.  Upon review of chart and communication with care team, patient's decision making abilities are not in question.. Patient was/were present in the room during visit.    Goals of ACP Conversation:  Discuss advance care planning documents  Facilitate a discussion related to patient's goals of care as they align with the patient's values and beliefs.    Health Care Decision Makers:       Primary Decision Maker: Lulú Lewis - [facility] - [phone]  Summary:  Documented Next of Kin, per patient report    Advance Care Planning Documents (Patient Wishes):  None     Assessment:  Patient readily points to her daughter as both her source of emotional and legal support.  Receptive to 's visit; prayer is important to her.      Interventions:  Provided education on documents for clarity and greater understanding  Discussed and provided education on state decision maker hierarchy    Care Preferences Communicated:   No    Outcomes/Plan:  Designation of health Care Decision Maker based on State Law    Electronically signed by ZULEYKA Neves on 5/7/2024 at 12:00 PM

## 2024-05-07 NOTE — PROGRESS NOTES
Contacted PT/OT to hopefully get patient evaluated prior to discharge to SNF, has been on bedrest orders during this admission.

## 2024-05-07 NOTE — PROGRESS NOTES
Occupational Therapy Initial Assessment  Date: 2024   Patient Name: Edie Cat  MRN: [id number]     : 1945    Date of Service: 2024    Discharge Recommendations:  Patient would benefit from continued therapy after discharge       Assessment   Assessment: Evaluation completed and tx initiated.  The patient would benefit from further skilled therapy to upgrade safety and functional independence.  Treatment Diagnosis: Covid 19  Activity Tolerance  Activity Tolerance: Patient Tolerated treatment well           Patient Diagnosis(es): There were no encounter diagnoses.   has a past medical history of A-fib (HCC), CAD (coronary artery disease), Cerebral artery occlusion with cerebral infarction (HCC), COPD (chronic obstructive pulmonary disease) (HCC), COVID, Dementia (HCC), Diabetes mellitus (HCC), Hx of blood clots, Hyperlipidemia, Hypertension, Knee arthroplasty, Thyroid disease, and TIA (transient ischemic attack).   has a past surgical history that includes Total knee arthroplasty (2011); Appendectomy; Cholecystectomy; and Abdomen surgery.    Treatment Diagnosis: Covid 19      Restrictions  Restrictions/Precautions  Restrictions/Precautions: Isolation, Fall Risk    Subjective   General  Chart Reviewed: Yes  Patient assessed for rehabilitation services?: Yes  Family / Caregiver Present: No  Pain Screening  Pain at present: 0  Scale Used: Numeric Score  Intervention List: Patient able to continue with treatment  Comments / Details: no pain with activity    Social/Functional History  Social/Functional History  Lives With: Family  Type of Home: House  Home Layout: One level  Home Access: Ramped entrance  Bathroom Shower/Tub: Walk-in shower  Bathroom Toilet: Standard  Bathroom Equipment: Grab bars in shower, Shower chair  Bathroom Accessibility: Accessible  Home Equipment: Cane, Walker, standard, Wheelchair-manual  Receives Help From: Family  ADL Assistance: Needs assistance  Ambulation Assistance:

## 2024-05-07 NOTE — DISCHARGE SUMMARY
Course: Ms. Wells was admitted5/2 for COVID 19 infection, mental status change, and hyponatremia.  She was initially admitted to CCU.  She had minimal symptoms regarding her COVID and this cleared quickly.  Her sodium remained low in spite of maximum medical management.  On 5/6, she was dosed with Samsca and had improvement in her sodium.  On the morning of 5/7 she was stable for transfer to SNF.    Physical Exam:  Vital Signs: BP (!) 145/88   Pulse 98   Temp 97.3 °F (36.3 °C) (Temporal)   Resp 20   Ht 1.575 m (5' 2\")   Wt 95.7 kg (211 lb)   SpO2 91%   BMI 38.59 kg/m²   General appearance:.Alert and Cooperative   HEENT: Normocephalic.  Chest: clear to auscultation bilaterally without wheezes or rhonchi.  Cardiac: Normal heart tones with regular rate and rhythm, S1, S2 normal. No murmurs, gallops, or rubs auscultated.   Abdomen:soft, non-tender; normal bowel sounds, no masses, no organomegaly.  Extremities: No clubbing or cyanosis. No peripheral edema. Peripheral pulses palpable.  Neurologic: Grossly intact.    Discharge Medications:         Medication List        START taking these medications      apixaban 5 MG Tabs tablet  Commonly known as: ELIQUIS  Take 1 tablet by mouth 2 times daily     dilTIAZem 360 MG extended release capsule  Commonly known as: Cardizem CD  Take 1 capsule by mouth daily     Vitamin D (Ergocalciferol) 45857 units Caps  Take 50,000 Units by mouth once a week  Start taking on: May 10, 2024            CHANGE how you take these medications      insulin glargine 100 UNIT/ML injection vial  Commonly known as: LANTUS  Inject 15 Units into the skin 2 times daily  What changed:   how much to take  when to take this     Metoprolol Tartrate 75 MG Tabs  Take 150 mg by mouth 2 times daily  What changed:   medication strength  how much to take            CONTINUE taking these medications      acetaminophen 500 MG tablet  Commonly known as: TYLENOL     amiodarone 200 MG tablet  Commonly known as:  medically stable and will be discharged to SNF.    Time spent on discharge 40 minutes.    Signed:  Darwin Ledesma DO

## 2024-05-07 NOTE — PROGRESS NOTES
Pt called out during rounds, at 1600 she was complaining of headache and shortness of breath. Vital signs assessed.   Temp 98.2, Pulse 127, /87, 88% on 2L.  Physician notified of change of condition and nurses assessment of increasing wheezes, audible at a distance. Pt repositioned, oxygen increased to 3L to regain sat greater than 92%. Discharge cancelled at this time to continue monitoring. Notified EMS, [facility], and daughter Lulú.   As heart rate was noted to be elevated  at 127, scheduled dose of cardizem was administered per order. Education provided to patient, she states she \"should have taken the damn thing earlier.\"  Pt also had elevated blood glucose of 372, initially read by this nurse from glucometer as 327. The 1600 dose of scheduled humalog was given, amounting 6 units of humalog. An additional 6 units of sliding scale humalog were given in correlation to the misinterpreted blood glucose level of 327. Nurse recognized blood glucose was misread, and patient actually required 8 units of humalog sliding scale based on sliding scale parameters. The discrepancy was corrected, pt ultimately received the appropriate dose per order set.   Physician aware, MAR updated with note, pt reports improved headache and ease of breathing as of 1656.  Daughter grateful for thorough updates on her mothers condition, hopeful for discharge tomorrow.   Electronically signed by Maria Luisa Danielson RN on 5/7/2024 at 4:59 PM

## 2024-05-07 NOTE — PROGRESS NOTES
Some stridor noted, pt states she is feeling some better. Night shift provided with report at bedside.

## 2024-05-07 NOTE — PROGRESS NOTES
Discharge held.  She was hypoxemic and having some wheezing when EMS arrived.  I suspect some reactive airways after her COVID infection.  Provide bronchodilators and steroids.

## 2024-05-07 NOTE — PLAN OF CARE
Problem: Discharge Planning  Goal: Discharge to home or other facility with appropriate resources  Outcome: Adequate for Discharge     Problem: Safety - Adult  Goal: Free from fall injury  Outcome: Adequate for Discharge     Problem: ABCDS Injury Assessment  Goal: Absence of physical injury  Outcome: Adequate for Discharge     Problem: Skin/Tissue Integrity  Goal: Absence of new skin breakdown  Description:  Monitor for areas of redness and/or skin breakdown    Outcome: Adequate for Discharge     Problem: Pain  Goal: Verbalizes/displays adequate comfort level or baseline comfort level  Outcome: Adequate for Discharge     Problem: Chronic Conditions and Co-morbidities  Goal: Patient's chronic conditions and co-morbidity symptoms are monitored and maintained or improved  Outcome: Adequate for Discharge     Problem: Neurosensory - Adult  Goal: Achieves stable or improved neurological status  Outcome: Adequate for Discharge     Problem: Respiratory - Adult  Goal: Achieves optimal ventilation and oxygenation  Outcome: Adequate for Discharge     Problem: Cardiovascular - Adult  Goal: Maintains optimal cardiac output and hemodynamic stability  Outcome: Adequate for Discharge     Problem: Skin/Tissue Integrity - Adult  Goal: Skin integrity remains intact  Outcome: Adequate for Discharge     Problem: Musculoskeletal - Adult  Goal: Return mobility to safest level of function  Outcome: Adequate for Discharge     Problem: Gastrointestinal - Adult  Goal: Minimal or absence of nausea and vomiting  Outcome: Adequate for Discharge     Problem: Genitourinary - Adult  Goal: Absence of urinary retention  Outcome: Adequate for Discharge     Problem: Infection - Adult  Goal: Absence of infection at discharge  Outcome: Adequate for Discharge     Problem: Metabolic/Fluid and Electrolytes - Adult  Goal: Electrolytes maintained within normal limits  Outcome: Adequate for Discharge     Problem: Anxiety  Goal: Will report anxiety at  manageable levels  Description: INTERVENTIONS:  1. Administer medication as ordered  2. Teach and rehearse alternative coping skills  3. Provide emotional support with 1:1 interaction with staff  Outcome: Adequate for Discharge     Problem: Coping  Goal: Pt/Family able to verbalize concerns and demonstrate effective coping strategies  Description: INTERVENTIONS:  1. Assist patient/family to identify coping skills, available support systems and cultural and spiritual values  2. Provide emotional support, including active listening and acknowledgement of concerns of patient and caregivers  3. Reduce environmental stimuli, as able  4. Instruct patient/family in relaxation techniques, as appropriate  5. Assess for spiritual pain/suffering and initiate Spiritual Care, Psychosocial Clinical Specialist consults as needed  Outcome: Adequate for Discharge     Problem: Change in Body Image  Goal: Pt/Family communicate acceptance of loss or change in body image and feel psychological comfort and peace  Description: INTERVENTIONS:  1. Assess patient/family anxiety and grief process related to change in body image, loss of functional status, loss of sense of self, and forgiveness  2. Provide emotional and spiritual support  3. Provide information about the patient's health status with consideration of family and cultural values  4. Communicate willingness to discuss loss and facilitate grief process with patient/family as appropriate  5. Emphasize sustaining relationships within family system and community, or jeff/spiritual traditions  6. Initiate Spiritual Care, Psychosocial Clinical Specialist consult as needed  Outcome: Adequate for Discharge     Problem: Decision Making  Goal: Pt/Family able to effectively weigh alternatives and participate in decision making related to treatment and care  Description: INTERVENTIONS:  1. Determine when there are differences between patient's view, family's view, and healthcare provider's

## 2024-05-07 NOTE — PROGRESS NOTES
Physical Therapy  Facility/Department: [facility] 4 ONCOLOGY UNIT  Physical Therapy Initial Assessment    Name: Edie Cat  : 1945  MRN: [id number]  Date of Service: 2024    Discharge Recommendations:  Patient would benefit from continued therapy after discharge (pt PLANS TO DC TO FACILITY FOR REHAB)          Patient Diagnosis(es): There were no encounter diagnoses.  Past Medical History:  has a past medical history of A-fib (HCC), CAD (coronary artery disease), Cerebral artery occlusion with cerebral infarction (HCC), COPD (chronic obstructive pulmonary disease) (HCC), COVID, Dementia (HCC), Diabetes mellitus (HCC), Hx of blood clots, Hyperlipidemia, Hypertension, Knee arthroplasty, Thyroid disease, and TIA (transient ischemic attack).  Past Surgical History:  has a past surgical history that includes Total knee arthroplasty (2011); Appendectomy; Cholecystectomy; and Abdomen surgery.    Assessment   Body Structures, Functions, Activity Limitations Requiring Skilled Therapeutic Intervention: Decreased functional mobility ;Decreased strength;Decreased endurance;Decreased balance;Decreased coordination  Assessment: pt WOULD BENEFIT FROM SKILLED PT IN THIS SETTING TO ADDRESS HER MOBILITY/STRENGTH DEFICITS  Therapy Prognosis: Good  Decision Making: Low Complexity  Requires PT Follow-Up: Yes  Activity Tolerance  Activity Tolerance: Patient tolerated treatment well     Plan   Physical Therapy Plan  General Plan: 5-7 times per week  Therapy Duration: 2 Weeks  Current Treatment Recommendations: Strengthening, Balance training, Functional mobility training, Transfer training, Pain management, Positioning, Gait training, Safety education & training, Therapeutic activities  Additional Comments: INSTRUCTED RN TO USE Cleanify FOR BTB. pT STOOD IN Cleanify TO WORK ON STANDING POSURE AND BALANCE. CAN PROGRESS TO RW WITH ASSIST OF TWO  Safety Devices  Type of Devices: Call light within reach, Heels elevated for

## 2024-05-08 LAB
ALBUMIN SERPL-MCNC: 3.6 G/DL (ref 3.5–5.2)
ALP SERPL-CCNC: 107 U/L (ref 35–104)
ALT SERPL-CCNC: 20 U/L (ref 5–33)
ANION GAP SERPL CALCULATED.3IONS-SCNC: 13 MMOL/L (ref 7–19)
AST SERPL-CCNC: 12 U/L (ref 5–32)
BASOPHILS # BLD: 0 K/UL (ref 0–0.2)
BASOPHILS NFR BLD: 0.1 % (ref 0–1)
BILIRUB SERPL-MCNC: 0.4 MG/DL (ref 0.2–1.2)
BUN SERPL-MCNC: 48 MG/DL (ref 8–23)
CALCIUM SERPL-MCNC: 9.2 MG/DL (ref 8.8–10.2)
CHLORIDE SERPL-SCNC: 93 MMOL/L (ref 98–111)
CO2 SERPL-SCNC: 27 MMOL/L (ref 22–29)
CREAT SERPL-MCNC: 1 MG/DL (ref 0.5–0.9)
EOSINOPHIL # BLD: 0 K/UL (ref 0–0.6)
EOSINOPHIL NFR BLD: 0 % (ref 0–5)
ERYTHROCYTE [DISTWIDTH] IN BLOOD BY AUTOMATED COUNT: 14.6 % (ref 11.5–14.5)
GLUCOSE BLD-MCNC: 230 MG/DL (ref 70–99)
GLUCOSE BLD-MCNC: 297 MG/DL (ref 70–99)
GLUCOSE BLD-MCNC: 317 MG/DL (ref 70–99)
GLUCOSE BLD-MCNC: 341 MG/DL (ref 70–99)
GLUCOSE SERPL-MCNC: 191 MG/DL (ref 74–109)
HCT VFR BLD AUTO: 38.3 % (ref 37–47)
HGB BLD-MCNC: 11.9 G/DL (ref 12–16)
IMM GRANULOCYTES # BLD: 0.1 K/UL
INR PPP: 1.85 (ref 0.88–1.18)
LYMPHOCYTES # BLD: 0.8 K/UL (ref 1.1–4.5)
LYMPHOCYTES NFR BLD: 6.5 % (ref 20–40)
MCH RBC QN AUTO: 25.5 PG (ref 27–31)
MCHC RBC AUTO-ENTMCNC: 31.1 G/DL (ref 33–37)
MCV RBC AUTO: 82.2 FL (ref 81–99)
MONOCYTES # BLD: 0.1 K/UL (ref 0–0.9)
MONOCYTES NFR BLD: 0.4 % (ref 0–10)
NEUTROPHILS # BLD: 11.3 K/UL (ref 1.5–7.5)
NEUTS SEG NFR BLD: 92.3 % (ref 50–65)
PERFORMED ON: ABNORMAL
PLATELET # BLD AUTO: 399 K/UL (ref 130–400)
PMV BLD AUTO: 9.1 FL (ref 9.4–12.3)
POTASSIUM SERPL-SCNC: 5.4 MMOL/L (ref 3.5–5)
PROT SERPL-MCNC: 7.9 G/DL (ref 6.6–8.7)
PROTHROMBIN TIME: 20.8 SEC (ref 12–14.6)
RBC # BLD AUTO: 4.66 M/UL (ref 4.2–5.4)
SODIUM SERPL-SCNC: 133 MMOL/L (ref 136–145)
WBC # BLD AUTO: 12.2 K/UL (ref 4.8–10.8)

## 2024-05-08 PROCEDURE — 94760 N-INVAS EAR/PLS OXIMETRY 1: CPT

## 2024-05-08 PROCEDURE — 97110 THERAPEUTIC EXERCISES: CPT

## 2024-05-08 PROCEDURE — 2700000000 HC OXYGEN THERAPY PER DAY

## 2024-05-08 PROCEDURE — 94150 VITAL CAPACITY TEST: CPT

## 2024-05-08 PROCEDURE — 1200000000 HC SEMI PRIVATE

## 2024-05-08 PROCEDURE — 6370000000 HC RX 637 (ALT 250 FOR IP): Performed by: INTERNAL MEDICINE

## 2024-05-08 PROCEDURE — 80053 COMPREHEN METABOLIC PANEL: CPT

## 2024-05-08 PROCEDURE — 85610 PROTHROMBIN TIME: CPT

## 2024-05-08 PROCEDURE — 94640 AIRWAY INHALATION TREATMENT: CPT

## 2024-05-08 PROCEDURE — 36415 COLL VENOUS BLD VENIPUNCTURE: CPT

## 2024-05-08 PROCEDURE — 85025 COMPLETE CBC W/AUTO DIFF WBC: CPT

## 2024-05-08 PROCEDURE — 82962 GLUCOSE BLOOD TEST: CPT

## 2024-05-08 PROCEDURE — 2580000003 HC RX 258: Performed by: INTERNAL MEDICINE

## 2024-05-08 PROCEDURE — 6360000002 HC RX W HCPCS: Performed by: INTERNAL MEDICINE

## 2024-05-08 RX ADMIN — DILTIAZEM HYDROCHLORIDE 60 MG: 30 TABLET, FILM COATED ORAL at 17:26

## 2024-05-08 RX ADMIN — INSULIN GLARGINE 15 UNITS: 100 INJECTION, SOLUTION SUBCUTANEOUS at 08:10

## 2024-05-08 RX ADMIN — DILTIAZEM HYDROCHLORIDE 60 MG: 30 TABLET, FILM COATED ORAL at 06:07

## 2024-05-08 RX ADMIN — METOPROLOL TARTRATE 150 MG: 50 TABLET, FILM COATED ORAL at 08:10

## 2024-05-08 RX ADMIN — INSULIN LISPRO 2 UNITS: 100 INJECTION, SOLUTION INTRAVENOUS; SUBCUTANEOUS at 09:32

## 2024-05-08 RX ADMIN — ALBUTEROL SULFATE 2 PUFF: 90 AEROSOL, METERED RESPIRATORY (INHALATION) at 19:19

## 2024-05-08 RX ADMIN — IPRATROPIUM BROMIDE 2 PUFF: 17 AEROSOL, METERED RESPIRATORY (INHALATION) at 10:28

## 2024-05-08 RX ADMIN — INSULIN LISPRO 6 UNITS: 100 INJECTION, SOLUTION INTRAVENOUS; SUBCUTANEOUS at 08:11

## 2024-05-08 RX ADMIN — INSULIN LISPRO 6 UNITS: 100 INJECTION, SOLUTION INTRAVENOUS; SUBCUTANEOUS at 17:26

## 2024-05-08 RX ADMIN — BUDESONIDE AND FORMOTEROL FUMARATE DIHYDRATE 2 PUFF: 160; 4.5 AEROSOL RESPIRATORY (INHALATION) at 19:19

## 2024-05-08 RX ADMIN — ATORVASTATIN CALCIUM 20 MG: 20 TABLET, FILM COATED ORAL at 08:10

## 2024-05-08 RX ADMIN — ALBUTEROL SULFATE 2 PUFF: 90 AEROSOL, METERED RESPIRATORY (INHALATION) at 10:28

## 2024-05-08 RX ADMIN — SODIUM ZIRCONIUM CYCLOSILICATE 5 G: 5 POWDER, FOR SUSPENSION ORAL at 08:10

## 2024-05-08 RX ADMIN — DILTIAZEM HYDROCHLORIDE 60 MG: 30 TABLET, FILM COATED ORAL at 12:59

## 2024-05-08 RX ADMIN — INSULIN GLARGINE 15 UNITS: 100 INJECTION, SOLUTION SUBCUTANEOUS at 20:57

## 2024-05-08 RX ADMIN — ALBUTEROL SULFATE 2 PUFF: 90 AEROSOL, METERED RESPIRATORY (INHALATION) at 14:25

## 2024-05-08 RX ADMIN — IPRATROPIUM BROMIDE 2 PUFF: 17 AEROSOL, METERED RESPIRATORY (INHALATION) at 06:55

## 2024-05-08 RX ADMIN — AMIODARONE HYDROCHLORIDE 200 MG: 200 TABLET ORAL at 08:10

## 2024-05-08 RX ADMIN — DILTIAZEM HYDROCHLORIDE 60 MG: 30 TABLET, FILM COATED ORAL at 00:53

## 2024-05-08 RX ADMIN — IPRATROPIUM BROMIDE 2 PUFF: 17 AEROSOL, METERED RESPIRATORY (INHALATION) at 14:25

## 2024-05-08 RX ADMIN — ALBUTEROL SULFATE 2 PUFF: 90 AEROSOL, METERED RESPIRATORY (INHALATION) at 06:55

## 2024-05-08 RX ADMIN — Medication 5000 UNITS: at 08:10

## 2024-05-08 RX ADMIN — APIXABAN 5 MG: 5 TABLET, FILM COATED ORAL at 08:10

## 2024-05-08 RX ADMIN — METOPROLOL TARTRATE 150 MG: 50 TABLET, FILM COATED ORAL at 20:56

## 2024-05-08 RX ADMIN — BUDESONIDE AND FORMOTEROL FUMARATE DIHYDRATE 2 PUFF: 160; 4.5 AEROSOL RESPIRATORY (INHALATION) at 06:55

## 2024-05-08 RX ADMIN — INSULIN LISPRO 6 UNITS: 100 INJECTION, SOLUTION INTRAVENOUS; SUBCUTANEOUS at 12:59

## 2024-05-08 RX ADMIN — INSULIN LISPRO 6 UNITS: 100 INJECTION, SOLUTION INTRAVENOUS; SUBCUTANEOUS at 13:00

## 2024-05-08 RX ADMIN — SODIUM ZIRCONIUM CYCLOSILICATE 5 G: 5 POWDER, FOR SUSPENSION ORAL at 12:59

## 2024-05-08 RX ADMIN — WATER 40 MG: 1 INJECTION INTRAMUSCULAR; INTRAVENOUS; SUBCUTANEOUS at 12:59

## 2024-05-08 RX ADMIN — APIXABAN 5 MG: 5 TABLET, FILM COATED ORAL at 20:56

## 2024-05-08 RX ADMIN — SENNOSIDES, DOCUSATE SODIUM 1 TABLET: 8.6; 5 TABLET ORAL at 08:10

## 2024-05-08 RX ADMIN — DONEPEZIL HYDROCHLORIDE 10 MG: 10 TABLET, FILM COATED ORAL at 20:56

## 2024-05-08 RX ADMIN — IPRATROPIUM BROMIDE 2 PUFF: 17 AEROSOL, METERED RESPIRATORY (INHALATION) at 19:19

## 2024-05-08 RX ADMIN — WATER 40 MG: 1 INJECTION INTRAMUSCULAR; INTRAVENOUS; SUBCUTANEOUS at 17:25

## 2024-05-08 RX ADMIN — SODIUM ZIRCONIUM CYCLOSILICATE 5 G: 5 POWDER, FOR SUSPENSION ORAL at 20:56

## 2024-05-08 NOTE — PROGRESS NOTES
Date:2024  Patient: Edie Cat  : 1945  MRN:[id number]  CODE:                                                                        PCP:Dudley Eduardo    Admit Date: 2024  7:17 PM   LOS: 6 days     Hospital course :  Ms. Wells was admitted for COVID 19 infection, mental status change, and hyponatremia.  She was initially admitted to CCU.  She had minimal symptoms regarding her COVID and this cleared quickly.  Her sodium remained low in spite of maximum medical management.  On , she was dosed with Samsca and had improvement in her sodium.  On the morning of  she was stable for transfer to SNF.     Subjective: Overnight potassium reported to be 5.4 started on Lokelma.  Discussed with RN the patient was wheezing in the morning.  At the time of my exam poor air entry in the lower one third of the lungs posteriorly with poor air movement and some rhonchi noted.  Discussed with the patient about the clinical finding and further management.  The patient denied using any oxygen at home    Review of Systems    Reviewed 12 system and found most of them negative except as stated above in subjective note    Objective:      Vital signs in last 24 hours:  Patient Vitals for the past 24 hrs:   BP Temp Pulse Resp SpO2   24 0800 (!) 142/77 -- (!) 108 -- --   24 0754 (!) 134/102 (!) 96.6 °F (35.9 °C) 100 16 96 %   24 0655 -- -- -- -- 97 %   24 0607 (!) 156/95 -- (!) 118 -- --   24 0053 (!) 151/95 -- (!) 105 -- --   24 -- -- (!) 102 18 95 %   24 (!) 151/85 97.2 °F (36.2 °C) (!) 105 17 95 %   24 1610 -- -- (!) 127 -- --   24 1605 (!) 143/87 98.2 °F (36.8 °C) (!) 127 -- 93 %       Patient examined with appropriate PPE  Physical Exam:  Vital Signs: BP (!) 142/77   Pulse (!) 108   Temp (!) 96.6 °F (35.9 °C)   Resp 16   Ht 1.575 m (5' 2\")   Wt 95.7 kg (211 lb)   SpO2 96%   BMI 38.59 kg/m²   General appearance:.Lying comfortably in bed ,    HEENT: Normocephalic , Atraumatic, PERRL, JVP not raised  Chest: On inspection no use of accessory muscles of respiration, on auscultation poor air entry in the lower one third of the lungs posteriorly with poor air movement and some rhonchi   cardiac: Regular rate and rhythm, S1, S2 normal. No murmurs, gallops, or rubs auscultated.   Abdomen:soft, non-tender; normal bowel sounds, no masses, no organomegaly.  Urogenital : no diaz, no suprapubic tenderness, no CVA tenderness  Extremities: No clubbing or cyanosis. No peripheral edema. Peripheral pulses palpable.  Neurologic: Alert and Cooperative , cranial nerves grossly intact, DTR equal, Power 5 /5   Psychology: No hallucination, no delusion, appropriate mood          Lab Review   Recent Results (from the past 24 hour(s))   POCT Glucose    Collection Time: 05/07/24  4:05 PM   Result Value Ref Range    POC Glucose 372 (H) 70 - 99 mg/dl    Performed on AccuChek    POCT Glucose    Collection Time: 05/07/24  8:01 PM   Result Value Ref Range    POC Glucose 182 (H) 70 - 99 mg/dl    Performed on AccuChek    Protime-INR    Collection Time: 05/08/24  3:55 AM   Result Value Ref Range    Protime 20.8 (H) 12.0 - 14.6 sec    INR 1.85 (H) 0.88 - 1.18   Comprehensive Metabolic Panel    Collection Time: 05/08/24  3:55 AM   Result Value Ref Range    Sodium 133 (L) 136 - 145 mmol/L    Potassium 5.4 (H) 3.5 - 5.0 mmol/L    Chloride 93 (L) 98 - 111 mmol/L    CO2 27 22 - 29 mmol/L    Anion Gap 13 7 - 19 mmol/L    Glucose 191 (H) 74 - 109 mg/dL    BUN 48 (H) 8 - 23 mg/dL    Creatinine 1.0 (H) 0.5 - 0.9 mg/dL    Est, Glom Filt Rate 57 (A) >60    Calcium 9.2 8.8 - 10.2 mg/dL    Total Protein 7.9 6.6 - 8.7 g/dL    Albumin 3.6 3.5 - 5.2 g/dL    Total Bilirubin 0.4 0.2 - 1.2 mg/dL    Alkaline Phosphatase 107 (H) 35 - 104 U/L    ALT 20 5 - 33 U/L    AST 12 5 - 32 U/L   CBC with Auto Differential    Collection Time: 05/08/24  3:55 AM   Result Value Ref Range    WBC 12.2 (H) 4.8 - 10.8 K/uL

## 2024-05-08 NOTE — PROGRESS NOTES
Physical Therapy    Name: Edie Cat  MRN: [id number]  Date of service: 5/8/2024 05/08/24 1600   Restrictions/Precautions   Restrictions/Precautions Isolation;Fall Risk   General   Diagnosis COVID, RESP FAILURE   General Comment   Comments RN Renee tolentino therapy   Subjective   Subjective Pt stated she is having breathing issues.   Bed mobility   Bed Mobility Comments did not get up this session   PT Exercises   Exercise Treatment ankle pumps, Heel slides, quad sets, glute set, and hip add x10 bilateral   Breathing Techniques encouraged to breathe during exercises.   Short Term Goals   Time Frame for Short Term Goals 2 WKS   Short Term Goal 1 SUP<>SIT, SBA   Short Term Goal 2 SIT<>STAND, CGA   Short Term Goal 3 TF'S WITH RW, CGA   Short Term Goal 4 AMB 50 FT WITH RW, MIN/CGA   Activity Tolerance   Activity Tolerance Patient tolerated treatment well;Patient limited by endurance   Assessment   Assessment Pt did not want to get OOB this afternoon. Performed exercises in bed.   Discharge Recommendations Patient would benefit from continued therapy after discharge   Physical Therapy Plan   General Plan 5-7 times per week   Therapy Duration 2 Weeks   Current Treatment Recommendations Strengthening;Balance training;Functional mobility training;Transfer training;Pain management;Positioning;Gait training;Safety education & training;Therapeutic activities   PT Plan of Care   Wednesday X   Safety Devices   Type of Devices Left in bed;Bed alarm in place;Call light within reach;Nurse notified   PT Whiteboard Notes   Therapy Whiteboard COVID, RESP FAILURE. RE 5-20   Recommendation   Requires PT Follow-Up Yes           Electronically signed by Iván Segura PTA on 5/8/2024 at 4:19 PM

## 2024-05-09 ENCOUNTER — APPOINTMENT (OUTPATIENT)
Age: 79
End: 2024-05-09
Attending: INTERNAL MEDICINE
Payer: MEDICARE

## 2024-05-09 LAB
ALBUMIN SERPL-MCNC: 3.3 G/DL (ref 3.5–5.2)
ALP SERPL-CCNC: 97 U/L (ref 35–104)
ALT SERPL-CCNC: 18 U/L (ref 5–33)
ANION GAP SERPL CALCULATED.3IONS-SCNC: 12 MMOL/L (ref 7–19)
AST SERPL-CCNC: 10 U/L (ref 5–32)
BASOPHILS # BLD: 0 K/UL (ref 0–0.2)
BASOPHILS NFR BLD: 0.1 % (ref 0–1)
BILIRUB SERPL-MCNC: 0.3 MG/DL (ref 0.2–1.2)
BUN SERPL-MCNC: 53 MG/DL (ref 8–23)
CALCIUM SERPL-MCNC: 9.1 MG/DL (ref 8.8–10.2)
CHLORIDE SERPL-SCNC: 90 MMOL/L (ref 98–111)
CO2 SERPL-SCNC: 27 MMOL/L (ref 22–29)
CREAT SERPL-MCNC: 1.1 MG/DL (ref 0.5–0.9)
ECHO AO ASC DIAM: 3.3 CM
ECHO AO ASCENDING AORTA INDEX: 1.68 CM/M2
ECHO AO ROOT DIAM: 2.2 CM
ECHO AO ROOT INDEX: 1.12 CM/M2
ECHO AV AREA PEAK VELOCITY: 1.5 CM2
ECHO AV AREA VTI: 1.3 CM2
ECHO AV AREA/BSA PEAK VELOCITY: 0.8 CM2/M2
ECHO AV AREA/BSA VTI: 0.7 CM2/M2
ECHO AV MEAN GRADIENT: 8 MMHG
ECHO AV MEAN VELOCITY: 1.3 M/S
ECHO AV PEAK GRADIENT: 12 MMHG
ECHO AV PEAK VELOCITY: 1.7 M/S
ECHO AV VELOCITY RATIO: 0.47
ECHO AV VTI: 33.6 CM
ECHO BSA: 2.02 M2
ECHO EST RA PRESSURE: 8 MMHG
ECHO IVC PROX: 2.3 CM
ECHO LA AREA 2C: 25.7 CM2
ECHO LA AREA 4C: 24.3 CM2
ECHO LA DIAMETER INDEX: 2.09 CM/M2
ECHO LA DIAMETER: 4.1 CM
ECHO LA MAJOR AXIS: 6.4 CM
ECHO LA MINOR AXIS: 6.4 CM
ECHO LA TO AORTIC ROOT RATIO: 1.86
ECHO LA VOL BP: 78 ML (ref 22–52)
ECHO LA VOL MOD A2C: 83 ML (ref 22–52)
ECHO LA VOL MOD A4C: 74 ML (ref 22–52)
ECHO LA VOL/BSA BIPLANE: 40 ML/M2 (ref 16–34)
ECHO LA VOLUME INDEX MOD A2C: 42 ML/M2 (ref 16–34)
ECHO LA VOLUME INDEX MOD A4C: 38 ML/M2 (ref 16–34)
ECHO LV E' LATERAL VELOCITY: 9 CM/S
ECHO LV E' SEPTAL VELOCITY: 8 CM/S
ECHO LV EDV A2C: 97 ML
ECHO LV EDV A4C: 81 ML
ECHO LV EDV INDEX A4C: 41 ML/M2
ECHO LV EDV NDEX A2C: 49 ML/M2
ECHO LV EJECTION FRACTION A2C: 41 %
ECHO LV EJECTION FRACTION A4C: 53 %
ECHO LV EJECTION FRACTION BIPLANE: 47 % (ref 55–100)
ECHO LV ESV A2C: 58 ML
ECHO LV ESV A4C: 39 ML
ECHO LV ESV INDEX A2C: 30 ML/M2
ECHO LV ESV INDEX A4C: 20 ML/M2
ECHO LVOT AREA: 3.1 CM2
ECHO LVOT AV VTI INDEX: 0.41
ECHO LVOT DIAM: 2 CM
ECHO LVOT MEAN GRADIENT: 1 MMHG
ECHO LVOT PEAK GRADIENT: 3 MMHG
ECHO LVOT PEAK VELOCITY: 0.8 M/S
ECHO LVOT STROKE VOLUME INDEX: 21.9 ML/M2
ECHO LVOT SV: 43 ML
ECHO LVOT VTI: 13.7 CM
ECHO MV AREA VTI: 1.3 CM2
ECHO MV E DECELERATION TIME (DT): 127 MS
ECHO MV E VELOCITY: 1.63 M/S
ECHO MV E/E' LATERAL: 18.11
ECHO MV E/E' RATIO (AVERAGED): 19.24
ECHO MV E/E' SEPTAL: 20.38
ECHO MV LVOT VTI INDEX: 2.42
ECHO MV MAX VELOCITY: 1.7 M/S
ECHO MV MEAN GRADIENT: 4 MMHG
ECHO MV MEAN VELOCITY: 0.9 M/S
ECHO MV PEAK GRADIENT: 11 MMHG
ECHO MV VTI: 33.1 CM
ECHO RA AREA 4C: 14 CM2
ECHO RA END SYSTOLIC VOLUME APICAL 4 CHAMBER INDEX BSA: 17 ML/M2
ECHO RA VOLUME: 33 ML
ECHO RIGHT VENTRICULAR SYSTOLIC PRESSURE (RVSP): 39 MMHG
ECHO RV TAPSE: 2 CM (ref 1.7–?)
ECHO TV REGURGITANT MAX VELOCITY: 2.79 M/S
ECHO TV REGURGITANT PEAK GRADIENT: 31 MMHG
EKG P AXIS: NORMAL DEGREES
EKG P-R INTERVAL: NORMAL MS
EKG Q-T INTERVAL: 298 MS
EKG QRS DURATION: 78 MS
EKG QTC CALCULATION (BAZETT): 439 MS
EKG T AXIS: 37 DEGREES
EOSINOPHIL # BLD: 0 K/UL (ref 0–0.6)
EOSINOPHIL NFR BLD: 0 % (ref 0–5)
ERYTHROCYTE [DISTWIDTH] IN BLOOD BY AUTOMATED COUNT: 14.9 % (ref 11.5–14.5)
GLUCOSE BLD-MCNC: 310 MG/DL (ref 70–99)
GLUCOSE BLD-MCNC: 345 MG/DL (ref 70–99)
GLUCOSE BLD-MCNC: 379 MG/DL (ref 70–99)
GLUCOSE BLD-MCNC: 434 MG/DL (ref 70–99)
GLUCOSE SERPL-MCNC: 260 MG/DL (ref 74–109)
HCT VFR BLD AUTO: 35.9 % (ref 37–47)
HGB BLD-MCNC: 11.4 G/DL (ref 12–16)
IMM GRANULOCYTES # BLD: 0.1 K/UL
INR PPP: 2.28 (ref 0.88–1.18)
LYMPHOCYTES # BLD: 1 K/UL (ref 1.1–4.5)
LYMPHOCYTES NFR BLD: 6.3 % (ref 20–40)
MCH RBC QN AUTO: 25.8 PG (ref 27–31)
MCHC RBC AUTO-ENTMCNC: 31.8 G/DL (ref 33–37)
MCV RBC AUTO: 81.2 FL (ref 81–99)
MONOCYTES # BLD: 0.2 K/UL (ref 0–0.9)
MONOCYTES NFR BLD: 1.2 % (ref 0–10)
NEUTROPHILS # BLD: 14.5 K/UL (ref 1.5–7.5)
NEUTS SEG NFR BLD: 91.7 % (ref 50–65)
OSMOLALITY SERPL CALC.SUM OF ELEC: 300 MOSM/KG (ref 275–300)
PERFORMED ON: ABNORMAL
PLATELET # BLD AUTO: 457 K/UL (ref 130–400)
PMV BLD AUTO: 9.3 FL (ref 9.4–12.3)
POTASSIUM SERPL-SCNC: 4.8 MMOL/L (ref 3.5–5)
PROT SERPL-MCNC: 7.3 G/DL (ref 6.6–8.7)
PROTHROMBIN TIME: 24.6 SEC (ref 12–14.6)
RBC # BLD AUTO: 4.42 M/UL (ref 4.2–5.4)
SODIUM SERPL-SCNC: 129 MMOL/L (ref 136–145)
URATE SERPL-MCNC: 6 MG/DL (ref 2.4–5.7)
WBC # BLD AUTO: 15.9 K/UL (ref 4.8–10.8)

## 2024-05-09 PROCEDURE — 6370000000 HC RX 637 (ALT 250 FOR IP): Performed by: INTERNAL MEDICINE

## 2024-05-09 PROCEDURE — 94760 N-INVAS EAR/PLS OXIMETRY 1: CPT

## 2024-05-09 PROCEDURE — 93306 TTE W/DOPPLER COMPLETE: CPT | Performed by: INTERNAL MEDICINE

## 2024-05-09 PROCEDURE — 2580000003 HC RX 258: Performed by: INTERNAL MEDICINE

## 2024-05-09 PROCEDURE — 2700000000 HC OXYGEN THERAPY PER DAY

## 2024-05-09 PROCEDURE — 6360000002 HC RX W HCPCS: Performed by: INTERNAL MEDICINE

## 2024-05-09 PROCEDURE — 36415 COLL VENOUS BLD VENIPUNCTURE: CPT

## 2024-05-09 PROCEDURE — 97530 THERAPEUTIC ACTIVITIES: CPT

## 2024-05-09 PROCEDURE — 97116 GAIT TRAINING THERAPY: CPT

## 2024-05-09 PROCEDURE — 97535 SELF CARE MNGMENT TRAINING: CPT

## 2024-05-09 PROCEDURE — A4216 STERILE WATER/SALINE, 10 ML: HCPCS | Performed by: INTERNAL MEDICINE

## 2024-05-09 PROCEDURE — 83930 ASSAY OF BLOOD OSMOLALITY: CPT

## 2024-05-09 PROCEDURE — 80053 COMPREHEN METABOLIC PANEL: CPT

## 2024-05-09 PROCEDURE — 85025 COMPLETE CBC W/AUTO DIFF WBC: CPT

## 2024-05-09 PROCEDURE — 99222 1ST HOSP IP/OBS MODERATE 55: CPT | Performed by: INTERNAL MEDICINE

## 2024-05-09 PROCEDURE — 2500000003 HC RX 250 WO HCPCS: Performed by: INTERNAL MEDICINE

## 2024-05-09 PROCEDURE — 85610 PROTHROMBIN TIME: CPT

## 2024-05-09 PROCEDURE — 2140000000 HC CCU INTERMEDIATE R&B

## 2024-05-09 PROCEDURE — 82962 GLUCOSE BLOOD TEST: CPT

## 2024-05-09 PROCEDURE — 84550 ASSAY OF BLOOD/URIC ACID: CPT

## 2024-05-09 PROCEDURE — 93005 ELECTROCARDIOGRAM TRACING: CPT | Performed by: INTERNAL MEDICINE

## 2024-05-09 PROCEDURE — 94640 AIRWAY INHALATION TREATMENT: CPT

## 2024-05-09 PROCEDURE — C8929 TTE W OR WO FOL WCON,DOPPLER: HCPCS

## 2024-05-09 PROCEDURE — 6360000004 HC RX CONTRAST MEDICATION: Performed by: INTERNAL MEDICINE

## 2024-05-09 PROCEDURE — 93010 ELECTROCARDIOGRAM REPORT: CPT | Performed by: INTERNAL MEDICINE

## 2024-05-09 RX ORDER — DIGOXIN 250 MCG
125 TABLET ORAL DAILY
Status: DISCONTINUED | OUTPATIENT
Start: 2024-05-09 | End: 2024-05-09

## 2024-05-09 RX ORDER — ALBUTEROL SULFATE 90 UG/1
2 AEROSOL, METERED RESPIRATORY (INHALATION) EVERY 8 HOURS
Status: DISCONTINUED | OUTPATIENT
Start: 2024-05-09 | End: 2024-05-09

## 2024-05-09 RX ORDER — DIGOXIN 0.25 MG/ML
500 INJECTION INTRAMUSCULAR; INTRAVENOUS ONCE
Status: DISCONTINUED | OUTPATIENT
Start: 2024-05-09 | End: 2024-05-09

## 2024-05-09 RX ORDER — LEVALBUTEROL INHALATION SOLUTION 0.63 MG/3ML
0.63 SOLUTION RESPIRATORY (INHALATION)
Status: DISCONTINUED | OUTPATIENT
Start: 2024-05-09 | End: 2024-05-26

## 2024-05-09 RX ORDER — DILTIAZEM HYDROCHLORIDE 120 MG/1
120 CAPSULE, COATED, EXTENDED RELEASE ORAL 2 TIMES DAILY
Status: DISCONTINUED | OUTPATIENT
Start: 2024-05-09 | End: 2024-05-11

## 2024-05-09 RX ORDER — DIGOXIN 250 MCG
125 TABLET ORAL DAILY
Status: DISCONTINUED | OUTPATIENT
Start: 2024-05-10 | End: 2024-05-09

## 2024-05-09 RX ORDER — DIGOXIN 0.25 MG/ML
500 INJECTION INTRAMUSCULAR; INTRAVENOUS ONCE
Status: COMPLETED | OUTPATIENT
Start: 2024-05-09 | End: 2024-05-09

## 2024-05-09 RX ORDER — DIGOXIN 0.25 MG/ML
250 INJECTION INTRAMUSCULAR; INTRAVENOUS EVERY 6 HOURS
Status: DISCONTINUED | OUTPATIENT
Start: 2024-05-09 | End: 2024-05-09

## 2024-05-09 RX ORDER — DILTIAZEM HYDROCHLORIDE 5 MG/ML
10 INJECTION INTRAVENOUS ONCE
Status: COMPLETED | OUTPATIENT
Start: 2024-05-09 | End: 2024-05-09

## 2024-05-09 RX ORDER — INSULIN GLARGINE 100 [IU]/ML
25 INJECTION, SOLUTION SUBCUTANEOUS 2 TIMES DAILY
Status: DISCONTINUED | OUTPATIENT
Start: 2024-05-09 | End: 2024-05-13

## 2024-05-09 RX ORDER — DIGOXIN 0.25 MG/ML
250 INJECTION INTRAMUSCULAR; INTRAVENOUS EVERY 6 HOURS
Status: COMPLETED | OUTPATIENT
Start: 2024-05-10 | End: 2024-05-10

## 2024-05-09 RX ADMIN — INSULIN GLARGINE 25 UNITS: 100 INJECTION, SOLUTION SUBCUTANEOUS at 21:03

## 2024-05-09 RX ADMIN — IPRATROPIUM BROMIDE 2 PUFF: 17 AEROSOL, METERED RESPIRATORY (INHALATION) at 19:37

## 2024-05-09 RX ADMIN — METOPROLOL SUCCINATE 75 MG: 50 TABLET, EXTENDED RELEASE ORAL at 18:48

## 2024-05-09 RX ADMIN — BUDESONIDE AND FORMOTEROL FUMARATE DIHYDRATE 2 PUFF: 160; 4.5 AEROSOL RESPIRATORY (INHALATION) at 19:36

## 2024-05-09 RX ADMIN — LEVALBUTEROL HYDROCHLORIDE 0.63 MG: 0.63 SOLUTION RESPIRATORY (INHALATION) at 15:19

## 2024-05-09 RX ADMIN — DILTIAZEM HYDROCHLORIDE 60 MG: 30 TABLET, FILM COATED ORAL at 05:36

## 2024-05-09 RX ADMIN — APIXABAN 5 MG: 5 TABLET, FILM COATED ORAL at 09:25

## 2024-05-09 RX ADMIN — DILTIAZEM HYDROCHLORIDE 10 MG: 5 INJECTION, SOLUTION INTRAVENOUS at 14:34

## 2024-05-09 RX ADMIN — WATER 40 MG: 1 INJECTION INTRAMUSCULAR; INTRAVENOUS; SUBCUTANEOUS at 00:39

## 2024-05-09 RX ADMIN — INSULIN LISPRO 4 UNITS: 100 INJECTION, SOLUTION INTRAVENOUS; SUBCUTANEOUS at 21:03

## 2024-05-09 RX ADMIN — IPRATROPIUM BROMIDE 2 PUFF: 17 AEROSOL, METERED RESPIRATORY (INHALATION) at 11:14

## 2024-05-09 RX ADMIN — INSULIN GLARGINE 15 UNITS: 100 INJECTION, SOLUTION SUBCUTANEOUS at 09:26

## 2024-05-09 RX ADMIN — DONEPEZIL HYDROCHLORIDE 10 MG: 10 TABLET, FILM COATED ORAL at 21:03

## 2024-05-09 RX ADMIN — LEVALBUTEROL HYDROCHLORIDE 0.63 MG: 0.63 SOLUTION RESPIRATORY (INHALATION) at 22:59

## 2024-05-09 RX ADMIN — DILTIAZEM HYDROCHLORIDE 120 MG: 120 CAPSULE, EXTENDED RELEASE ORAL at 18:22

## 2024-05-09 RX ADMIN — SENNOSIDES, DOCUSATE SODIUM 1 TABLET: 8.6; 5 TABLET ORAL at 09:25

## 2024-05-09 RX ADMIN — BUDESONIDE AND FORMOTEROL FUMARATE DIHYDRATE 2 PUFF: 160; 4.5 AEROSOL RESPIRATORY (INHALATION) at 07:48

## 2024-05-09 RX ADMIN — IPRATROPIUM BROMIDE 2 PUFF: 17 AEROSOL, METERED RESPIRATORY (INHALATION) at 15:19

## 2024-05-09 RX ADMIN — APIXABAN 5 MG: 5 TABLET, FILM COATED ORAL at 21:03

## 2024-05-09 RX ADMIN — ATORVASTATIN CALCIUM 20 MG: 20 TABLET, FILM COATED ORAL at 09:25

## 2024-05-09 RX ADMIN — INSULIN LISPRO 6 UNITS: 100 INJECTION, SOLUTION INTRAVENOUS; SUBCUTANEOUS at 09:26

## 2024-05-09 RX ADMIN — INSULIN LISPRO 6 UNITS: 100 INJECTION, SOLUTION INTRAVENOUS; SUBCUTANEOUS at 09:27

## 2024-05-09 RX ADMIN — IPRATROPIUM BROMIDE 2 PUFF: 17 AEROSOL, METERED RESPIRATORY (INHALATION) at 07:49

## 2024-05-09 RX ADMIN — AMIODARONE HYDROCHLORIDE 200 MG: 200 TABLET ORAL at 09:25

## 2024-05-09 RX ADMIN — WATER 40 MG: 1 INJECTION INTRAMUSCULAR; INTRAVENOUS; SUBCUTANEOUS at 05:36

## 2024-05-09 RX ADMIN — INSULIN LISPRO 8 UNITS: 100 INJECTION, SOLUTION INTRAVENOUS; SUBCUTANEOUS at 16:40

## 2024-05-09 RX ADMIN — Medication 5000 UNITS: at 09:25

## 2024-05-09 RX ADMIN — DILTIAZEM HYDROCHLORIDE 60 MG: 30 TABLET, FILM COATED ORAL at 00:39

## 2024-05-09 RX ADMIN — DIGOXIN 500 MCG: 0.25 INJECTION INTRAMUSCULAR; INTRAVENOUS at 18:48

## 2024-05-09 RX ADMIN — METOPROLOL TARTRATE 150 MG: 50 TABLET, FILM COATED ORAL at 09:25

## 2024-05-09 RX ADMIN — INSULIN LISPRO 6 UNITS: 100 INJECTION, SOLUTION INTRAVENOUS; SUBCUTANEOUS at 16:41

## 2024-05-09 RX ADMIN — ALBUTEROL SULFATE 2 PUFF: 90 AEROSOL, METERED RESPIRATORY (INHALATION) at 07:49

## 2024-05-09 RX ADMIN — PERFLUTREN 10 ML: 6.52 INJECTION, SUSPENSION INTRAVENOUS at 16:20

## 2024-05-09 ASSESSMENT — PAIN - FUNCTIONAL ASSESSMENT: PAIN_FUNCTIONAL_ASSESSMENT: PREVENTS OR INTERFERES SOME ACTIVE ACTIVITIES AND ADLS

## 2024-05-09 ASSESSMENT — PAIN SCALES - GENERAL: PAINLEVEL_OUTOF10: 6

## 2024-05-09 ASSESSMENT — PAIN DESCRIPTION - PAIN TYPE: TYPE: CHRONIC PAIN

## 2024-05-09 ASSESSMENT — PAIN DESCRIPTION - FREQUENCY: FREQUENCY: CONTINUOUS

## 2024-05-09 ASSESSMENT — PAIN DESCRIPTION - DESCRIPTORS: DESCRIPTORS: ACHING;DISCOMFORT

## 2024-05-09 ASSESSMENT — PAIN DESCRIPTION - ONSET: ONSET: ON-GOING

## 2024-05-09 ASSESSMENT — PAIN DESCRIPTION - ORIENTATION: ORIENTATION: LEFT

## 2024-05-09 ASSESSMENT — PAIN DESCRIPTION - LOCATION: LOCATION: KNEE

## 2024-05-09 NOTE — PROGRESS NOTES
Occupational Therapy     05/09/24 1200   Subjective   Subjective Pt in bed upon arrival for therapy. Pt agreeable to participate.   Pain Assessment   Pain Assessment 0-10   Pain Level 6   Pain Location Knee   Pain Orientation Left   Pain Descriptors Aching;Discomfort   Functional Pain Assessment Prevents or interferes some active activities and ADLs   Pain Type Chronic pain   Pain Frequency Continuous   Pain Onset On-going   Non-Pharmaceutical Pain Intervention(s) Ambulation/Increased Activity;Repositioned;Rest   Response to Pain Intervention Patient satisfied   Vitals   O2 Device Nasal cannula   Cognition   Overall Cognitive Status Exceptions   Cognition Comment Awake, alert, following directions, easily engaged in therapeutic activity. Requires cues for technique and supervision for safety   Orientation   Overall Orientation Status WFL   Bed Mobility Training   Bed Mobility Training Yes   Overall Level of Assistance Moderate assistance;Maximum assistance   Interventions Verbal cues;Tactile cues;Manual cues   Supine to Sit Moderate assistance;Maximum assistance   Scooting Moderate assistance;Maximum assistance   Transfer Training   Transfer Training Yes   Overall Level of Assistance Minimum assistance;Assist X2   Interventions Verbal cues;Tactile cues;Manual cues   Sit to Stand Minimum assistance;Assist X2   Stand to Sit Minimum assistance;Assist X2   Bed to Chair Minimum assistance;Assist X2   Balance   Sitting Intact   Standing With support   ADL   Feeding Independent   Grooming Independent;Setup   UE Bathing Minimal assistance;Moderate assistance   LE Bathing Moderate assistance;Maximum assistance   UE Dressing Minimal assistance   LE Dressing Moderate assistance;Maximum assistance   Toileting Maximum assistance;Dependent/Total   Toileting Skilled Clinical Factors Incontinent   Functional Mobility Moderate assistance   Functional Mobility Skilled Clinical Factors Very limited by L knee pain   Assessment    Assessment Tx focused on bed mobility, t/f to recliner, STS mobility at RW level to get changed and cleaned up. Pt required extensive clean up due to bowel movement. Pt limited by pain in L knee during mobility. Pt agreeable to sit up in recliner with chair alarm donned for lunch.   Activity Tolerance Patient limited by endurance;Patient limited by pain   Discharge Recommendations Patient would benefit from continued therapy after discharge;24 hour supervision or assist   Occupational Therapy Plan   Times Per Week 3-5   Times Per Day Once a day   OT Plan of Care   Thursday X     Electronically signed by SLAVA Junior on 5/9/2024 at 12:42 PM

## 2024-05-09 NOTE — PROGRESS NOTES
Edie Cat transferred to 719-2  from [facility] via bed.    Reason for transfer: A Flutter    Explained reason for transfer to Patient.   Belongings: Glasses, cell phone with patient at bedside .   Soft chart transferred with patient: Yes.  Telemetry box number [id number] transferred with patient: yes.  Report given to: RASHI Hammond, at bedside.      Electronically signed by Stephany Marsh RN on 5/9/2024 at 5:59 PM

## 2024-05-09 NOTE — CONSULTS
[facility]  Cardiology Consult      Requesting MD:  Rasta Madsen MD   Admit Status:         History obtained from:   [] Patient  [] Other (specify):     PROBLEM LIST:    Patient Active Problem List    Diagnosis Date Noted    Diabetes mellitus (HCC) 09/26/2011     Priority: Low     Overview Note:     replace inactive diagnosis      Hyperglycemia 09/26/2011     Priority: Low    Morbid obesity (HCC) 09/26/2011     Priority: Low    History of stroke 09/26/2011     Priority: Low    Pulmonary embolus (HCC) 09/26/2011     Priority: Low    Systemic hypertension 09/26/2011     Priority: Low    Functional blindness 09/26/2011     Priority: Low    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  Persistent atrial fibrillation, on amiodarone and Coumadin, normal LV systolic function with moderate left atrial enlargement, mild aortic stenosis.  2.  Moderate dementia.  3.  It is requiring diabetes mellitus.  4.  COVID infection.    PRESENTATION: Edie Cat is a 78 y.o. year old female who presents with shortness of breath with COVID infection admitted 5/2/2024.  Had been in atrial fibrillation on presentation with RVR.  Asked evaluate patient currently due to rapid heart rates.  Patient reports no chest pain or shortness of breath.  Appears comfortable in bed.    REVIEW OF SYSTEMS:  Review of Systems  methylPREDNISolone  40 mg IntraVENous Daily    insulin glargine  25 Units SubCUTAneous BID    digoxin  500 mcg IntraVENous Once    digoxin  250 mcg IntraVENous Q6H    [START ON 5/10/2024] digoxin  125 mcg Oral Daily    metoprolol succinate  75 mg Oral BID    dilTIAZem  120 mg Oral BID    budesonide-formoterol  2 puff Inhalation BID RT    ipratropium  2 puff Inhalation Q4H WA RT    apixaban  5 mg Oral BID    insulin lispro  6 Units SubCUTAneous TID WC    vitamin D  50,000 Units Oral Weekly    vitamin D  5,000 Units Oral Daily    donepezil  10 mg Oral Nightly    insulin lispro  0-8 Units SubCUTAneous TID WC    insulin lispro  0-4 Units SubCUTAneous Nightly    sennosides-docusate sodium  1 tablet Oral Daily    atorvastatin  20 mg Oral Daily       Current Infused Meds:   dextrose         Physical Exam:  Vitals:    05/09/24 1430   BP:    Pulse: (!) 145   Resp:    Temp:    SpO2:        Intake/Output Summary (Last 24 hours) at 5/9/2024 1813  Last data filed at 5/9/2024 0913  Gross per 24 hour   Intake 120 ml   Output 780 ml   Net -660 ml     Estimated body mass index is 38.58 kg/m² as calculated from the following:    Height as of this encounter: 1.575 m (5' 2.01\").    Weight as of this encounter: 95.7 kg (211 lb).        Physical Exam  Constitutional:       General: She is not in acute distress.     Appearance: She is obese. She is not diaphoretic.   HENT:      Mouth/Throat:      Pharynx: No oropharyngeal exudate.   Eyes:      General: No scleral icterus.        Right eye: No discharge.         Left eye: No discharge.   Neck:      Thyroid: No thyromegaly.      Vascular: No JVD.   Cardiovascular:      Rate and Rhythm: Normal rate and regular rhythm. No extrasystoles are present.     Heart sounds: S1 normal and S2 normal. Murmur heard.      No systolic murmur is present.      No diastolic murmur is present.      No friction rub. No gallop. No S3 or S4 sounds.      Comments: No JVD  No edema  Systolic murmur appreciated

## 2024-05-09 NOTE — PROGRESS NOTES
4 Eyes Skin Assessment     NAME:  Edie Cat  YOB: 1945  MEDICAL RECORD NUMBER:  [id number]    The patient is being assessed for  Transfer to New Unit    I agree that at least one RN has performed a thorough Head to Toe Skin Assessment on the patient. ALL assessment sites listed below have been assessed.      Areas assessed by both nurses:    Head, Face, Ears, Shoulders, Back, Chest, Arms, Elbows, Hands, Sacrum. Buttock, Coccyx, Ischium, Legs. Feet and Heels, and Under Medical Devices         Does the Patient have a Wound? Yes wound(s) were present on assessment. LDA wound assessment was Initiated and completed by RN       Bryon Prevention initiated by RN: Yes  Wound Care Orders initiated by RN: {YES/NO:[id number]}    Pressure Injury (Stage 3,4, Unstageable, DTI, NWPT, and Complex wounds) if present, place Wound referral order by RN under : Yes    New Ostomies, if present place, Ostomy referral order under : No     Nurse 1 eSignature: Electronically signed by Stephany Marsh RN on 5/9/24 at 5:58 PM CDT    **SHARE this note so that the co-signing nurse can place an eSignature**    Nurse 2 eSignature: {Esignature:[id number]}

## 2024-05-09 NOTE — PROGRESS NOTES
Physician Progress Note      PATIENT:               JOHN MICHAEL  CSN #:                  [id number]  :                       1945  ADMIT DATE:       2024 7:17 PM  DISCH DATE:  RESPONDING  PROVIDER #:        Rasta Madsen MD          QUERY TEXT:    Patient admitted with COVID-19, noted to have chronic atrial fibrillation and   is maintained on Coumadin. If possible, please document in progress notes and   discharge summary if you are evaluating and/or treating any of the following:    The medical record reflects the following:  Risk Factors: 78 year-old female with hx of TIA/CVA, DM, htn, and blood clots  Clinical Indicators: afib  Treatment: Coumadin    Thank you,  Gerhard RN, CCDS  [email or web address]  Options provided:  -- Secondary hypercoagulable state in a patient with atrial fibrillation  -- Other - I will add my own diagnosis  -- Disagree - Not applicable / Not valid  -- Disagree - Clinically unable to determine / Unknown  -- Refer to Clinical Documentation Reviewer    PROVIDER RESPONSE TEXT:    This patient has secondary hypercoagulable state in a patient with atrial   fibrillation.    Query created by: Gerhard Oliver on 5/3/2024 9:55 AM      Electronically signed by:  Rasta Madsen MD 2024 1:37 PM

## 2024-05-09 NOTE — PROGRESS NOTES
Physical Therapy    Name: Edie Cat  MRN: [id number]  Date of service: 5/9/2024 05/09/24 1100   Restrictions/Precautions   Restrictions/Precautions Isolation;Fall Risk   General   Diagnosis COVID, RESP FAILURE   General Comment   Comments pt in bed   Subjective   Subjective agrees to get OOB   Subjective   Subjective knees hurting   Observation/Palpation   Observation USING PURE WICK FOR TOILETING. 02 NC.   Bed mobility   Supine to Sit Minimal assistance;Moderate assistance;2 Person assistance   Transfers   Sit to Stand Minimal Assistance;2 Person Assistance   Stand to Sit Minimal Assistance;2 Person Assistance   Comment stood 4 times for 2-3 minutes to clean, change brief, replace purwick, etc.   Ambulation   Surface Level tile   Device Rolling Walker   Assistance Minimal assistance;2 Person assistance   Quality of Gait UNSTEADY OVER ALL. UNABLE TO PLACE L HEEL ON FLOOR DUE TO WHAT APPEARS TO BE A LEG LENGTH DISCREPANCY.   Gait Deviations Slow Malena;Decreased step length;Decreased step height   Distance TOOK STEPS FROM BED TO RECLINER PLACED AT BEDSIDE   Comments knee buckling but no LOB. increased pain with ambulation   Short Term Goals   Time Frame for Short Term Goals 2 WKS   Short Term Goal 1 SUP<>SIT, SBA   Short Term Goal 2 SIT<>STAND, CGA   Short Term Goal 3 TF'S WITH RW, CGA   Short Term Goal 4 AMB 50 FT WITH RW, MIN/CGA   Activity Tolerance   Activity Tolerance Patient tolerated treatment well   Assessment   Assessment Pt with no complaints of breathing this session. Bed mobility minx2. Sat EOB to prepare chair. Stood and transfered to chair minx2. Pt has increase instability and pain in knees with transfers. Pt haad BM and needed cleaned. Stood several time for clean up. Left pt in chair with all needs in reach   Discharge Recommendations Patient would benefit from continued therapy after discharge   Physical Therapy Plan   General Plan 5-7 times per week   Therapy Duration 2 Weeks   Current  Treatment Recommendations Strengthening;Balance training;Functional mobility training;Transfer training;Pain management;Positioning;Gait training;Safety education & training;Therapeutic activities   PT Plan of Care   Thursday X   Safety Devices   Type of Devices Left in chair;Chair alarm in place;Call light within reach   PT Whiteboard Notes   Therapy Whiteboard COVID, RESP FAILURE. RE 5-20   Recommendation   Requires PT Follow-Up Yes         Electronically signed by Iván Segura PTA on 5/9/2024 at 12:59 PM

## 2024-05-10 LAB
ALBUMIN SERPL-MCNC: 3 G/DL (ref 3.5–5.2)
ALP SERPL-CCNC: 93 U/L (ref 35–104)
ALT SERPL-CCNC: 20 U/L (ref 5–33)
ANION GAP SERPL CALCULATED.3IONS-SCNC: 12 MMOL/L (ref 7–19)
AST SERPL-CCNC: 14 U/L (ref 5–32)
BASOPHILS # BLD: 0 K/UL (ref 0–0.2)
BASOPHILS NFR BLD: 0.1 % (ref 0–1)
BILIRUB SERPL-MCNC: 0.2 MG/DL (ref 0.2–1.2)
BUN SERPL-MCNC: 59 MG/DL (ref 8–23)
CALCIUM SERPL-MCNC: 9 MG/DL (ref 8.8–10.2)
CHLORIDE SERPL-SCNC: 92 MMOL/L (ref 98–111)
CO2 SERPL-SCNC: 23 MMOL/L (ref 22–29)
CREAT SERPL-MCNC: 1.3 MG/DL (ref 0.5–0.9)
EOSINOPHIL # BLD: 0 K/UL (ref 0–0.6)
EOSINOPHIL NFR BLD: 0 % (ref 0–5)
ERYTHROCYTE [DISTWIDTH] IN BLOOD BY AUTOMATED COUNT: 15.1 % (ref 11.5–14.5)
GLUCOSE BLD-MCNC: 226 MG/DL (ref 70–99)
GLUCOSE BLD-MCNC: 235 MG/DL (ref 70–99)
GLUCOSE BLD-MCNC: 341 MG/DL (ref 70–99)
GLUCOSE BLD-MCNC: 346 MG/DL (ref 70–99)
GLUCOSE BLD-MCNC: 451 MG/DL (ref 70–99)
GLUCOSE SERPL-MCNC: 405 MG/DL (ref 74–109)
HCT VFR BLD AUTO: 39.6 % (ref 37–47)
HGB BLD-MCNC: 11.7 G/DL (ref 12–16)
IMM GRANULOCYTES # BLD: 0.1 K/UL
LYMPHOCYTES # BLD: 0.9 K/UL (ref 1.1–4.5)
LYMPHOCYTES NFR BLD: 6.3 % (ref 20–40)
MCH RBC QN AUTO: 26.2 PG (ref 27–31)
MCHC RBC AUTO-ENTMCNC: 29.5 G/DL (ref 33–37)
MCV RBC AUTO: 88.8 FL (ref 81–99)
MONOCYTES # BLD: 0.8 K/UL (ref 0–0.9)
MONOCYTES NFR BLD: 5.7 % (ref 0–10)
NEUTROPHILS # BLD: 12.4 K/UL (ref 1.5–7.5)
NEUTS SEG NFR BLD: 87.3 % (ref 50–65)
OSMOLALITY URINE: 612 MOSM/KG (ref 250–1200)
PERFORMED ON: ABNORMAL
PLATELET # BLD AUTO: 443 K/UL (ref 130–400)
PMV BLD AUTO: 9.4 FL (ref 9.4–12.3)
POTASSIUM SERPL-SCNC: 5 MMOL/L (ref 3.5–5)
PROT SERPL-MCNC: 6.7 G/DL (ref 6.6–8.7)
RBC # BLD AUTO: 4.46 M/UL (ref 4.2–5.4)
SODIUM SERPL-SCNC: 127 MMOL/L (ref 136–145)
SODIUM UR-SCNC: 46 MMOL/L
WBC # BLD AUTO: 14.2 K/UL (ref 4.8–10.8)

## 2024-05-10 PROCEDURE — 82962 GLUCOSE BLOOD TEST: CPT

## 2024-05-10 PROCEDURE — 94760 N-INVAS EAR/PLS OXIMETRY 1: CPT

## 2024-05-10 PROCEDURE — 97530 THERAPEUTIC ACTIVITIES: CPT

## 2024-05-10 PROCEDURE — 85025 COMPLETE CBC W/AUTO DIFF WBC: CPT

## 2024-05-10 PROCEDURE — 2140000000 HC CCU INTERMEDIATE R&B

## 2024-05-10 PROCEDURE — 94640 AIRWAY INHALATION TREATMENT: CPT

## 2024-05-10 PROCEDURE — 99232 SBSQ HOSP IP/OBS MODERATE 35: CPT | Performed by: INTERNAL MEDICINE

## 2024-05-10 PROCEDURE — 2700000000 HC OXYGEN THERAPY PER DAY

## 2024-05-10 PROCEDURE — 6360000002 HC RX W HCPCS: Performed by: INTERNAL MEDICINE

## 2024-05-10 PROCEDURE — 6370000000 HC RX 637 (ALT 250 FOR IP): Performed by: INTERNAL MEDICINE

## 2024-05-10 PROCEDURE — 2580000003 HC RX 258: Performed by: INTERNAL MEDICINE

## 2024-05-10 PROCEDURE — 83935 ASSAY OF URINE OSMOLALITY: CPT

## 2024-05-10 PROCEDURE — 6370000000 HC RX 637 (ALT 250 FOR IP): Performed by: HOSPITALIST

## 2024-05-10 PROCEDURE — 2500000003 HC RX 250 WO HCPCS: Performed by: INTERNAL MEDICINE

## 2024-05-10 PROCEDURE — 80053 COMPREHEN METABOLIC PANEL: CPT

## 2024-05-10 PROCEDURE — 84300 ASSAY OF URINE SODIUM: CPT

## 2024-05-10 PROCEDURE — 36415 COLL VENOUS BLD VENIPUNCTURE: CPT

## 2024-05-10 RX ORDER — DILTIAZEM HYDROCHLORIDE 5 MG/ML
10 INJECTION INTRAVENOUS ONCE
Status: COMPLETED | OUTPATIENT
Start: 2024-05-10 | End: 2024-05-10

## 2024-05-10 RX ADMIN — INSULIN HUMAN 5 UNITS: 100 INJECTION, SOLUTION PARENTERAL at 06:08

## 2024-05-10 RX ADMIN — INSULIN LISPRO 6 UNITS: 100 INJECTION, SOLUTION INTRAVENOUS; SUBCUTANEOUS at 08:32

## 2024-05-10 RX ADMIN — INSULIN LISPRO 2 UNITS: 100 INJECTION, SOLUTION INTRAVENOUS; SUBCUTANEOUS at 08:33

## 2024-05-10 RX ADMIN — WATER 40 MG: 1 INJECTION INTRAMUSCULAR; INTRAVENOUS; SUBCUTANEOUS at 08:38

## 2024-05-10 RX ADMIN — INSULIN LISPRO 6 UNITS: 100 INJECTION, SOLUTION INTRAVENOUS; SUBCUTANEOUS at 17:34

## 2024-05-10 RX ADMIN — SENNOSIDES, DOCUSATE SODIUM 1 TABLET: 8.6; 5 TABLET ORAL at 08:38

## 2024-05-10 RX ADMIN — INSULIN HUMAN 10 UNITS: 100 INJECTION, SOLUTION PARENTERAL at 18:02

## 2024-05-10 RX ADMIN — IPRATROPIUM BROMIDE 2 PUFF: 17 AEROSOL, METERED RESPIRATORY (INHALATION) at 10:46

## 2024-05-10 RX ADMIN — INSULIN LISPRO 6 UNITS: 100 INJECTION, SOLUTION INTRAVENOUS; SUBCUTANEOUS at 12:41

## 2024-05-10 RX ADMIN — AMIODARONE HYDROCHLORIDE 1 MG/MIN: 50 INJECTION, SOLUTION INTRAVENOUS at 12:37

## 2024-05-10 RX ADMIN — IPRATROPIUM BROMIDE 2 PUFF: 17 AEROSOL, METERED RESPIRATORY (INHALATION) at 07:26

## 2024-05-10 RX ADMIN — ERGOCALCIFEROL 50000 UNITS: 1.25 CAPSULE ORAL at 08:37

## 2024-05-10 RX ADMIN — INSULIN GLARGINE 25 UNITS: 100 INJECTION, SOLUTION SUBCUTANEOUS at 20:31

## 2024-05-10 RX ADMIN — INSULIN GLARGINE 25 UNITS: 100 INJECTION, SOLUTION SUBCUTANEOUS at 08:33

## 2024-05-10 RX ADMIN — APIXABAN 5 MG: 5 TABLET, FILM COATED ORAL at 08:37

## 2024-05-10 RX ADMIN — Medication 5000 UNITS: at 08:38

## 2024-05-10 RX ADMIN — Medication 0.5 MG/MIN: at 22:00

## 2024-05-10 RX ADMIN — INSULIN LISPRO 4 UNITS: 100 INJECTION, SOLUTION INTRAVENOUS; SUBCUTANEOUS at 20:32

## 2024-05-10 RX ADMIN — DONEPEZIL HYDROCHLORIDE 10 MG: 10 TABLET, FILM COATED ORAL at 20:31

## 2024-05-10 RX ADMIN — LEVALBUTEROL HYDROCHLORIDE 0.63 MG: 0.63 SOLUTION RESPIRATORY (INHALATION) at 23:15

## 2024-05-10 RX ADMIN — DILTIAZEM HYDROCHLORIDE 10 MG: 5 INJECTION, SOLUTION INTRAVENOUS at 11:31

## 2024-05-10 RX ADMIN — IPRATROPIUM BROMIDE 2 PUFF: 17 AEROSOL, METERED RESPIRATORY (INHALATION) at 14:36

## 2024-05-10 RX ADMIN — DIGOXIN 250 MCG: 0.25 INJECTION INTRAMUSCULAR; INTRAVENOUS at 06:08

## 2024-05-10 RX ADMIN — APIXABAN 5 MG: 5 TABLET, FILM COATED ORAL at 20:31

## 2024-05-10 RX ADMIN — DILTIAZEM HYDROCHLORIDE 5 MG/HR: 5 INJECTION, SOLUTION INTRAVENOUS at 11:47

## 2024-05-10 RX ADMIN — METOPROLOL SUCCINATE 75 MG: 50 TABLET, EXTENDED RELEASE ORAL at 20:31

## 2024-05-10 RX ADMIN — DIGOXIN 250 MCG: 0.25 INJECTION INTRAMUSCULAR; INTRAVENOUS at 01:16

## 2024-05-10 RX ADMIN — AMIODARONE HYDROCHLORIDE 150 MG: 50 INJECTION, SOLUTION INTRAVENOUS at 11:45

## 2024-05-10 RX ADMIN — INSULIN LISPRO 8 UNITS: 100 INJECTION, SOLUTION INTRAVENOUS; SUBCUTANEOUS at 17:34

## 2024-05-10 RX ADMIN — Medication 15 G: at 08:34

## 2024-05-10 RX ADMIN — ATORVASTATIN CALCIUM 20 MG: 20 TABLET, FILM COATED ORAL at 08:37

## 2024-05-10 RX ADMIN — LEVALBUTEROL HYDROCHLORIDE 0.63 MG: 0.63 SOLUTION RESPIRATORY (INHALATION) at 07:26

## 2024-05-10 RX ADMIN — DILTIAZEM HYDROCHLORIDE 120 MG: 120 CAPSULE, EXTENDED RELEASE ORAL at 08:38

## 2024-05-10 RX ADMIN — DILTIAZEM HYDROCHLORIDE 15 MG/HR: 5 INJECTION, SOLUTION INTRAVENOUS at 22:00

## 2024-05-10 RX ADMIN — LEVALBUTEROL HYDROCHLORIDE 0.63 MG: 0.63 SOLUTION RESPIRATORY (INHALATION) at 14:36

## 2024-05-10 RX ADMIN — DILTIAZEM HYDROCHLORIDE 120 MG: 120 CAPSULE, EXTENDED RELEASE ORAL at 20:31

## 2024-05-10 RX ADMIN — METOPROLOL SUCCINATE 75 MG: 50 TABLET, EXTENDED RELEASE ORAL at 08:37

## 2024-05-10 RX ADMIN — BUDESONIDE AND FORMOTEROL FUMARATE DIHYDRATE 2 PUFF: 160; 4.5 AEROSOL RESPIRATORY (INHALATION) at 07:26

## 2024-05-10 NOTE — PROGRESS NOTES
Physical Therapy  Name: Edie Cat  MRN:  [id number]  Date of service:  5/10/2024       05/10/24 1123   Restrictions/Precautions   Restrictions/Precautions Isolation;Fall Risk   Subjective   Subjective agrees to get OOB   Oxygen Therapy   O2 Device None (Room air)   Bed Mobility   Supine to Sit Minimal assistance   Transfers   Sit to Stand 2 Person Assistance;Minimal Assistance   Stand to Sit 2 Person Assistance;Moderate Assistance   Bed to Chair 2 Person Assistance;Maximum assistance   Exercises   Knee Long Arc Quad 10   Short Term Goals   Time Frame for Short Term Goals 2 WKS   Short Term Goal 1 SUP<>SIT, SBA   Short Term Goal 2 SIT<>STAND, CGA   Short Term Goal 3 TF'S WITH RW, CGA   Short Term Goal 4 AMB 50 FT WITH RW, MIN/CGA   Conditions Requiring Skilled Therapeutic Intervention   Body Structures, Functions, Activity Limitations Requiring Skilled Therapeutic Intervention Decreased functional mobility ;Decreased strength;Decreased endurance;Decreased balance;Decreased coordination   Assessment Patient becomes anxious once she is on her feet. She requires assist to weight shift and move feet.  She was not listening to therapist instructions.  She was a 2 person transfer to chair.   Discharge Recommendations Continue to assess pending progress;Subacute/Skilled Nursing Facility;Therapy recommended at discharge;Patient would benefit from continued therapy after discharge   Activity Tolerance   Activity Tolerance Treatment limited secondary to agitation;Patient limited by endurance;Patient limited by fatigue   Physical Therapy Plan   General Plan 5-7 times per week   Therapy Duration 2 Weeks   Safety Devices   Type of Devices Call light within reach;Left in chair;Nurse notified         Electronically signed by Ghazala Tabares PTA on 5/10/2024 at 11:57 AM

## 2024-05-10 NOTE — PROGRESS NOTES
Physical Therapy  Name: Edie Cat  MRN:  [id number]  Date of service:  5/10/2024       05/10/24 1346   Restrictions/Precautions   Restrictions/Precautions Isolation;Fall Risk   Subjective   Subjective ready to return to bed   Oxygen Therapy   O2 Device None (Room air)   Bed Mobility   Sit to Supine Moderate assistance;Maximal assistance   Transfers   Sit to Stand Moderate Assistance   Stand to Sit Moderate Assistance   Bed to Chair Minimal assistance   Ambulation   Surface Level tile   Device Rolling Walker   Assistance Minimal assistance   Gait Deviations Slow Malena;Decreased step length;Decreased step height   Distance TOOK STEPS FROM RECLINER BACK TO BED   Exercises   Hip Flexion seated marching x 10   Knee Long Arc Quad 10   Ankle Pumps 10   Short Term Goals   Time Frame for Short Term Goals 2 WKS   Short Term Goal 1 SUP<>SIT, SBA   Short Term Goal 2 SIT<>STAND, CGA   Short Term Goal 3 TF'S WITH RW, CGA   Short Term Goal 4 AMB 50 FT WITH RW, MIN/CGA   Conditions Requiring Skilled Therapeutic Intervention   Body Structures, Functions, Activity Limitations Requiring Skilled Therapeutic Intervention Decreased functional mobility ;Decreased strength;Decreased endurance;Decreased balance;Decreased coordination   Assessment Patient becomes anxious once she is on her feet. She would not let go of the chair to grab walker.  She was loudly stating that she was going to fall, Verbal cues to let go of the chair and reach for the walker.  Once both hands were on walker she was able to move feet and perform transfer to bed with little assist.  She did require assist to lay down due to sliding off side of bed due to height of air mattress.   Discharge Recommendations Continue to assess pending progress;Subacute/Skilled Nursing Facility;Therapy recommended at discharge;Patient would benefit from continued therapy after discharge   Activity Tolerance   Activity Tolerance Treatment limited secondary to agitation;Patient

## 2024-05-10 NOTE — PROGRESS NOTES
Date:5/10/2024  Patient: Edie Cat  : 1945  MRN:[id number]  CODE:                                                                        PCP:Dudley Eduardo    Admit Date: 2024  7:17 PM   LOS: 8 days     Hospital course :  Ms. Wells was admitted for COVID 19 infection, mental status change, and hyponatremia.    She was initially admitted to CCU.  She had minimal symptoms regarding her COVID and this cleared quickly.  Her sodium remained low in spite of maximum medical management.  On , she was dosed with Samsca and had improvement in her sodium.   On  the sodium improved to 133 and at the time of discharge back to SNF the patient developed shortness of breath and started treatment for COPD so the discharge was held  Treated for hyperkalemia on   Developed A-fib with RVR consulted cardiology for further evaluation, medication adjusted during this admission with Coumadin changed to Eliquis for ease of anticoagulation.   On  developed severe tachycardia found to have A-fib with RVR consulted cardiology and transferred to PCU for further care    Subjective:   5/10 seen and evaluated in PCU in the presence of RN, the patient is breathing in the room air, no wheezing but still tachycardic cardiology on board adjusted medication, evaluated for hyponatremia suspected SIADH placed on appropriate medication.  Encourage the patient to get out of bed to chair and use incentive spirometry        Review of Systems    Reviewed 12 system and found most of them negative except as stated above in subjective note    Objective:      Vital signs in last 24 hours:  Patient Vitals for the past 24 hrs:   BP Temp Temp src Pulse Resp SpO2 Weight   05/10/24 0803 -- -- -- 55 -- 93 % --   05/10/24 0802 (!) 148/96 97.7 °F (36.5 °C) Temporal (!) 141 18 96 % --   05/10/24 0720 -- -- -- -- -- 96 % --   05/10/24 0110 134/89 98.9 °F (37.2 °C) Temporal (!) 114 22 92 % 87.7 kg (193 lb 5 oz)   24 133/71 98.5 °F

## 2024-05-10 NOTE — PLAN OF CARE
Problem: Discharge Planning  Goal: Discharge to home or other facility with appropriate resources  5/10/2024 0850 by Mari De La Rosa RN  Outcome: Progressing  Flowsheets (Taken 5/10/2024 0846)  Discharge to home or other facility with appropriate resources: Identify barriers to discharge with patient and caregiver  5/9/2024 2309 by Tyler Bingham RN  Outcome: Progressing  Flowsheets (Taken 5/9/2024 1725 by Stephany Marsh, RN)  Discharge to home or other facility with appropriate resources:   Identify barriers to discharge with patient and caregiver   Arrange for needed discharge resources and transportation as appropriate   Identify discharge learning needs (meds, wound care, etc)   Refer to discharge planning if patient needs post-hospital services based on physician order or complex needs related to functional status, cognitive ability or social support system     Problem: Safety - Adult  Goal: Free from fall injury  5/10/2024 0850 by Mari De La Rosa RN  Outcome: Progressing  5/9/2024 2309 by Tyler Bingham RN  Outcome: Progressing     Problem: ABCDS Injury Assessment  Goal: Absence of physical injury  5/10/2024 0850 by Mari De La Rosa RN  Outcome: Progressing  5/9/2024 2309 by Tyler Bingham RN  Outcome: Progressing     Problem: Skin/Tissue Integrity  Goal: Absence of new skin breakdown  Description:  Monitor for areas of redness and/or skin breakdown    Outcome: Progressing     Problem: Pain  Goal: Verbalizes/displays adequate comfort level or baseline comfort level  Outcome: Progressing     Problem: Chronic Conditions and Co-morbidities  Goal: Patient's chronic conditions and co-morbidity symptoms are monitored and maintained or improved  Outcome: Progressing     Problem: Neurosensory - Adult  Goal: Achieves stable or improved neurological status  Outcome: Progressing     Problem: Respiratory - Adult  Goal: Achieves optimal ventilation and oxygenation  Outcome: Progressing     Problem:

## 2024-05-10 NOTE — PROGRESS NOTES
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     Priority: Low    Arthritis, degenerative 09/26/2011     Priority: Low    Dementia (HCC) 09/26/2011     Priority: Low    Former cigarette smoker 09/26/2011     Priority: Low    Psoriasis 09/26/2011     Priority: Low    Hyponatremia 09/26/2011     Priority: Low    Vitamin D deficiency 09/26/2011     Priority: Low    Atrial fibrillation (HCC) 08/12/2011     Priority: Low     Overview Note:     8/12/2011 DCCV on aminodarone      CAD (coronary artery disease) 08/12/2011     Priority: Low     Overview Note:     8/15/2011  cardiolyte negative for myocardial ischemia, EF  66%         Admit Date:  5/2/2024    Admission Problem List: Present on Admission:   (Resolved) COVID-19      Cardiac Specific Data:  Specialty Problems          Cardiology Problems    Atrial fibrillation (HCC)        CAD (coronary artery disease)        Pulmonary embolus (HCC)        Systemic hypertension         1.  Persistent atrial flutter, atypical, on amiodarone and Coumadin, normal LV systolic function with moderate left atrial enlargement, mild aortic stenosis.  2.  Moderate dementia.  3.  It is requiring diabetes mellitus.  4.  COVID infection.    Subjective:  Ms. Cat does not report any issues though some mild shortness of breath noted.  Remains tachycardic with EKG suggestive of atypical atrial flutter.  Rates not

## 2024-05-10 NOTE — PLAN OF CARE
Problem: Discharge Planning  Goal: Discharge to home or other facility with appropriate resources  Outcome: Progressing  Flowsheets (Taken 5/9/2024 1725 by Setphany Marsh, RN)  Discharge to home or other facility with appropriate resources:   Identify barriers to discharge with patient and caregiver   Arrange for needed discharge resources and transportation as appropriate   Identify discharge learning needs (meds, wound care, etc)   Refer to discharge planning if patient needs post-hospital services based on physician order or complex needs related to functional status, cognitive ability or social support system     Problem: Safety - Adult  Goal: Free from fall injury  Outcome: Progressing     Problem: ABCDS Injury Assessment  Goal: Absence of physical injury  Outcome: Progressing     Problem: Chronic Conditions and Co-morbidities  Goal: Patient's chronic conditions and co-morbidity symptoms are monitored and maintained or improved  Recent Flowsheet Documentation  Taken 5/9/2024 1725 by Stephany Marsh, RN  Care Plan - Patient's Chronic Conditions and Co-Morbidity Symptoms are Monitored and Maintained or Improved:   Monitor and assess patient's chronic conditions and comorbid symptoms for stability, deterioration, or improvement   Collaborate with multidisciplinary team to address chronic and comorbid conditions and prevent exacerbation or deterioration   Update acute care plan with appropriate goals if chronic or comorbid symptoms are exacerbated and prevent overall improvement and discharge

## 2024-05-11 LAB
ALBUMIN SERPL-MCNC: 3 G/DL (ref 3.5–5.2)
ALP SERPL-CCNC: 81 U/L (ref 35–104)
ALT SERPL-CCNC: 20 U/L (ref 5–33)
ANION GAP SERPL CALCULATED.3IONS-SCNC: 10 MMOL/L (ref 7–19)
AST SERPL-CCNC: 12 U/L (ref 5–32)
BASOPHILS # BLD: 0 K/UL (ref 0–0.2)
BASOPHILS NFR BLD: 0.1 % (ref 0–1)
BILIRUB SERPL-MCNC: 0.3 MG/DL (ref 0.2–1.2)
BUN SERPL-MCNC: 54 MG/DL (ref 8–23)
CALCIUM SERPL-MCNC: 8.6 MG/DL (ref 8.8–10.2)
CHLORIDE SERPL-SCNC: 92 MMOL/L (ref 98–111)
CO2 SERPL-SCNC: 27 MMOL/L (ref 22–29)
CREAT SERPL-MCNC: 1 MG/DL (ref 0.5–0.9)
EOSINOPHIL # BLD: 0 K/UL (ref 0–0.6)
EOSINOPHIL NFR BLD: 0 % (ref 0–5)
ERYTHROCYTE [DISTWIDTH] IN BLOOD BY AUTOMATED COUNT: 14.5 % (ref 11.5–14.5)
GLUCOSE BLD-MCNC: 167 MG/DL (ref 70–99)
GLUCOSE BLD-MCNC: 186 MG/DL (ref 70–99)
GLUCOSE BLD-MCNC: 192 MG/DL (ref 70–99)
GLUCOSE BLD-MCNC: 224 MG/DL (ref 70–99)
GLUCOSE BLD-MCNC: 244 MG/DL (ref 70–99)
GLUCOSE SERPL-MCNC: 173 MG/DL (ref 74–109)
HCT VFR BLD AUTO: 34.5 % (ref 37–47)
HGB BLD-MCNC: 10.7 G/DL (ref 12–16)
IMM GRANULOCYTES # BLD: 0.2 K/UL
LYMPHOCYTES # BLD: 1.3 K/UL (ref 1.1–4.5)
LYMPHOCYTES NFR BLD: 6.9 % (ref 20–40)
MCH RBC QN AUTO: 24.8 PG (ref 27–31)
MCHC RBC AUTO-ENTMCNC: 31 G/DL (ref 33–37)
MCV RBC AUTO: 80 FL (ref 81–99)
MONOCYTES # BLD: 1.1 K/UL (ref 0–0.9)
MONOCYTES NFR BLD: 6 % (ref 0–10)
NEUTROPHILS # BLD: 15.6 K/UL (ref 1.5–7.5)
NEUTS SEG NFR BLD: 86 % (ref 50–65)
PERFORMED ON: ABNORMAL
PLATELET # BLD AUTO: 460 K/UL (ref 130–400)
PMV BLD AUTO: 8.7 FL (ref 9.4–12.3)
POTASSIUM SERPL-SCNC: 4.7 MMOL/L (ref 3.5–5)
PROT SERPL-MCNC: 6.4 G/DL (ref 6.6–8.7)
RBC # BLD AUTO: 4.31 M/UL (ref 4.2–5.4)
SODIUM SERPL-SCNC: 129 MMOL/L (ref 136–145)
WBC # BLD AUTO: 18.1 K/UL (ref 4.8–10.8)

## 2024-05-11 PROCEDURE — 80053 COMPREHEN METABOLIC PANEL: CPT

## 2024-05-11 PROCEDURE — 6360000002 HC RX W HCPCS: Performed by: INTERNAL MEDICINE

## 2024-05-11 PROCEDURE — 6370000000 HC RX 637 (ALT 250 FOR IP): Performed by: INTERNAL MEDICINE

## 2024-05-11 PROCEDURE — 82962 GLUCOSE BLOOD TEST: CPT

## 2024-05-11 PROCEDURE — 2700000000 HC OXYGEN THERAPY PER DAY

## 2024-05-11 PROCEDURE — 99232 SBSQ HOSP IP/OBS MODERATE 35: CPT | Performed by: INTERNAL MEDICINE

## 2024-05-11 PROCEDURE — 85025 COMPLETE CBC W/AUTO DIFF WBC: CPT

## 2024-05-11 PROCEDURE — 2500000003 HC RX 250 WO HCPCS: Performed by: INTERNAL MEDICINE

## 2024-05-11 PROCEDURE — 94640 AIRWAY INHALATION TREATMENT: CPT

## 2024-05-11 PROCEDURE — 2140000000 HC CCU INTERMEDIATE R&B

## 2024-05-11 PROCEDURE — 2580000003 HC RX 258: Performed by: INTERNAL MEDICINE

## 2024-05-11 PROCEDURE — 36415 COLL VENOUS BLD VENIPUNCTURE: CPT

## 2024-05-11 RX ORDER — DILTIAZEM HYDROCHLORIDE 240 MG/1
240 CAPSULE, COATED, EXTENDED RELEASE ORAL 2 TIMES DAILY
Status: DISCONTINUED | OUTPATIENT
Start: 2024-05-11 | End: 2024-05-12

## 2024-05-11 RX ORDER — FUROSEMIDE 10 MG/ML
40 INJECTION INTRAMUSCULAR; INTRAVENOUS ONCE
Status: COMPLETED | OUTPATIENT
Start: 2024-05-11 | End: 2024-05-11

## 2024-05-11 RX ORDER — METOPROLOL SUCCINATE 50 MG/1
100 TABLET, EXTENDED RELEASE ORAL 2 TIMES DAILY
Status: DISCONTINUED | OUTPATIENT
Start: 2024-05-11 | End: 2024-05-12

## 2024-05-11 RX ADMIN — INSULIN LISPRO 6 UNITS: 100 INJECTION, SOLUTION INTRAVENOUS; SUBCUTANEOUS at 12:10

## 2024-05-11 RX ADMIN — INSULIN GLARGINE 25 UNITS: 100 INJECTION, SOLUTION SUBCUTANEOUS at 08:38

## 2024-05-11 RX ADMIN — FUROSEMIDE 40 MG: 10 INJECTION, SOLUTION INTRAMUSCULAR; INTRAVENOUS at 16:17

## 2024-05-11 RX ADMIN — IPRATROPIUM BROMIDE 2 PUFF: 17 AEROSOL, METERED RESPIRATORY (INHALATION) at 07:18

## 2024-05-11 RX ADMIN — DONEPEZIL HYDROCHLORIDE 10 MG: 10 TABLET, FILM COATED ORAL at 21:21

## 2024-05-11 RX ADMIN — METOPROLOL SUCCINATE 75 MG: 50 TABLET, EXTENDED RELEASE ORAL at 08:38

## 2024-05-11 RX ADMIN — APIXABAN 5 MG: 5 TABLET, FILM COATED ORAL at 21:21

## 2024-05-11 RX ADMIN — INSULIN LISPRO 2 UNITS: 100 INJECTION, SOLUTION INTRAVENOUS; SUBCUTANEOUS at 17:27

## 2024-05-11 RX ADMIN — METOPROLOL SUCCINATE 100 MG: 50 TABLET, EXTENDED RELEASE ORAL at 16:17

## 2024-05-11 RX ADMIN — LEVALBUTEROL HYDROCHLORIDE 0.63 MG: 0.63 SOLUTION RESPIRATORY (INHALATION) at 22:30

## 2024-05-11 RX ADMIN — LEVALBUTEROL HYDROCHLORIDE 0.63 MG: 0.63 SOLUTION RESPIRATORY (INHALATION) at 14:43

## 2024-05-11 RX ADMIN — APIXABAN 5 MG: 5 TABLET, FILM COATED ORAL at 08:38

## 2024-05-11 RX ADMIN — Medication 5000 UNITS: at 08:38

## 2024-05-11 RX ADMIN — DILTIAZEM HYDROCHLORIDE 5 MG/HR: 5 INJECTION, SOLUTION INTRAVENOUS at 10:28

## 2024-05-11 RX ADMIN — IPRATROPIUM BROMIDE 2 PUFF: 17 AEROSOL, METERED RESPIRATORY (INHALATION) at 14:43

## 2024-05-11 RX ADMIN — Medication 15 G: at 08:39

## 2024-05-11 RX ADMIN — DILTIAZEM HYDROCHLORIDE 240 MG: 240 CAPSULE, EXTENDED RELEASE ORAL at 21:22

## 2024-05-11 RX ADMIN — SENNOSIDES, DOCUSATE SODIUM 1 TABLET: 8.6; 5 TABLET ORAL at 08:38

## 2024-05-11 RX ADMIN — ATORVASTATIN CALCIUM 20 MG: 20 TABLET, FILM COATED ORAL at 08:38

## 2024-05-11 RX ADMIN — Medication 0.5 MG/MIN: at 14:42

## 2024-05-11 RX ADMIN — INSULIN LISPRO 6 UNITS: 100 INJECTION, SOLUTION INTRAVENOUS; SUBCUTANEOUS at 17:26

## 2024-05-11 RX ADMIN — INSULIN LISPRO 2 UNITS: 100 INJECTION, SOLUTION INTRAVENOUS; SUBCUTANEOUS at 12:10

## 2024-05-11 RX ADMIN — IPRATROPIUM BROMIDE 2 PUFF: 17 AEROSOL, METERED RESPIRATORY (INHALATION) at 10:49

## 2024-05-11 RX ADMIN — BUDESONIDE AND FORMOTEROL FUMARATE DIHYDRATE 2 PUFF: 160; 4.5 AEROSOL RESPIRATORY (INHALATION) at 07:18

## 2024-05-11 RX ADMIN — IPRATROPIUM BROMIDE 2 PUFF: 17 AEROSOL, METERED RESPIRATORY (INHALATION) at 18:23

## 2024-05-11 RX ADMIN — INSULIN LISPRO 6 UNITS: 100 INJECTION, SOLUTION INTRAVENOUS; SUBCUTANEOUS at 08:38

## 2024-05-11 RX ADMIN — INSULIN GLARGINE 25 UNITS: 100 INJECTION, SOLUTION SUBCUTANEOUS at 21:22

## 2024-05-11 RX ADMIN — BUDESONIDE AND FORMOTEROL FUMARATE DIHYDRATE 2 PUFF: 160; 4.5 AEROSOL RESPIRATORY (INHALATION) at 18:23

## 2024-05-11 RX ADMIN — LEVALBUTEROL HYDROCHLORIDE 0.63 MG: 0.63 SOLUTION RESPIRATORY (INHALATION) at 07:17

## 2024-05-11 RX ADMIN — DILTIAZEM HYDROCHLORIDE 120 MG: 120 CAPSULE, EXTENDED RELEASE ORAL at 08:38

## 2024-05-11 NOTE — PLAN OF CARE
Problem: Discharge Planning  Goal: Discharge to home or other facility with appropriate resources  5/11/2024 0941 by Melissa Mcghee RN  Outcome: Progressing  Flowsheets (Taken 5/11/2024 0832)  Discharge to home or other facility with appropriate resources: Identify barriers to discharge with patient and caregiver  5/10/2024 2346 by Tyler Bingham RN  Outcome: Progressing     Problem: Safety - Adult  Goal: Free from fall injury  5/11/2024 0941 by Melissa Mcghee RN  Outcome: Progressing  5/10/2024 2346 by Tyler Bingham RN  Outcome: Progressing     Problem: ABCDS Injury Assessment  Goal: Absence of physical injury  Outcome: Progressing     Problem: Skin/Tissue Integrity  Goal: Absence of new skin breakdown  Description:  Monitor for areas of redness and/or skin breakdown    Outcome: Progressing     Problem: Pain  Goal: Verbalizes/displays adequate comfort level or baseline comfort level  Outcome: Progressing     Problem: Chronic Conditions and Co-morbidities  Goal: Patient's chronic conditions and co-morbidity symptoms are monitored and maintained or improved  Outcome: Progressing  Flowsheets (Taken 5/11/2024 0832)  Care Plan - Patient's Chronic Conditions and Co-Morbidity Symptoms are Monitored and Maintained or Improved: Monitor and assess patient's chronic conditions and comorbid symptoms for stability, deterioration, or improvement     Problem: Neurosensory - Adult  Goal: Achieves stable or improved neurological status  Outcome: Progressing  Flowsheets (Taken 5/11/2024 0832)  Achieves stable or improved neurological status: Assess for and report changes in neurological status     Problem: Respiratory - Adult  Goal: Achieves optimal ventilation and oxygenation  Outcome: Progressing  Flowsheets (Taken 5/11/2024 0832)  Achieves optimal ventilation and oxygenation: Assess for changes in respiratory status     Problem: Cardiovascular - Adult  Goal: Maintains optimal cardiac output and hemodynamic

## 2024-05-11 NOTE — PROGRESS NOTES
Date:2024  Patient: Edie Cat  : 1945  MRN:[id number]  CODE:                                                                        PCP:Dudley Eduardo    Admit Date: 2024  7:17 PM   LOS: 9 days     Hospital course :  Ms. Wells was admitted for COVID 19 infection, mental status change, and hyponatremia.    She was initially admitted to CCU.  She had minimal symptoms regarding her COVID and this cleared quickly.  Her sodium remained low in spite of maximum medical management.  On , she was dosed with Samsca and had improvement in her sodium.   On  the sodium improved to 133 and at the time of discharge back to SNF the patient developed shortness of breath and started treatment for COPD so the discharge was held  Treated for hyperkalemia on   Developed A-fib with RVR consulted cardiology for further evaluation, medication adjusted during this admission with Coumadin changed to Eliquis for ease of anticoagulation.   On  developed severe tachycardia found to have A-fib with RVR consulted cardiology and transferred to PCU for further care    Subjective:   5/10 seen and evaluated in PCU in the presence of RN, the patient is breathing in the room air, no wheezing but still tachycardic cardiology on board adjusted medication, evaluated for hyponatremia suspected SIADH placed on appropriate medication.  Encourage the patient to get out of bed to chair and use incentive spirometry   seen and evaluated at bedside along with RN, the patient is not having any complaint, feeling better presently on Cardizem and amiodarone infusion cardiology on board.       Review of Systems    Reviewed 12 system and found most of them negative except as stated above in subjective note    Objective:      Vital signs in last 24 hours:  Patient Vitals for the past 24 hrs:   BP Temp Temp src Pulse Resp SpO2   24 1205 121/78 97 °F (36.1 °C) Temporal (!) 117 16 92 %   24 0832 -- -- -- (!) 112 -- --

## 2024-05-11 NOTE — PROGRESS NOTES
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     Priority: Low    Arthritis, degenerative 09/26/2011     Priority: Low    Dementia (HCC) 09/26/2011     Priority: Low    Former cigarette smoker 09/26/2011     Priority: Low    Psoriasis 09/26/2011     Priority: Low    Hyponatremia 09/26/2011     Priority: Low    Vitamin D deficiency 09/26/2011     Priority: Low    Atrial fibrillation (HCC) 08/12/2011     Priority: Low     Overview Note:     8/12/2011 DCCV on aminodarone      CAD (coronary artery disease) 08/12/2011     Priority: Low     Overview Note:     8/15/2011  cardiolyte negative for myocardial ischemia, EF  66%         Admit Date:  5/2/2024    Admission Problem List: Present on Admission:   (Resolved) COVID-19      Cardiac Specific Data:  Specialty Problems          Cardiology Problems    Atrial fibrillation (HCC)        CAD (coronary artery disease)        Pulmonary embolus (HCC)        Systemic hypertension         1.  Persistent atrial flutter, atypical, on amiodarone and Coumadin, normal LV systolic function with moderate left atrial enlargement, mild aortic stenosis.  2.  Moderate dementia.  3.  It is requiring diabetes mellitus.  4.  COVID infection.    Subjective:  Ms. Cat continues to have some difficulty with rate control on IV Cardizem, oral Cardizem and metoprolol as well as IV amiodarone.  Flutter type rhythm that is likely

## 2024-05-12 LAB
ALBUMIN SERPL-MCNC: 3.2 G/DL (ref 3.5–5.2)
ALP SERPL-CCNC: 82 U/L (ref 35–104)
ALT SERPL-CCNC: 23 U/L (ref 5–33)
ANION GAP SERPL CALCULATED.3IONS-SCNC: 11 MMOL/L (ref 7–19)
AST SERPL-CCNC: 13 U/L (ref 5–32)
BASOPHILS # BLD: 0 K/UL (ref 0–0.2)
BASOPHILS NFR BLD: 0.1 % (ref 0–1)
BILIRUB SERPL-MCNC: 0.4 MG/DL (ref 0.2–1.2)
BUN SERPL-MCNC: 48 MG/DL (ref 8–23)
CALCIUM SERPL-MCNC: 8.8 MG/DL (ref 8.8–10.2)
CHLORIDE SERPL-SCNC: 94 MMOL/L (ref 98–111)
CO2 SERPL-SCNC: 29 MMOL/L (ref 22–29)
CREAT SERPL-MCNC: 1 MG/DL (ref 0.5–0.9)
EOSINOPHIL # BLD: 0.2 K/UL (ref 0–0.6)
EOSINOPHIL NFR BLD: 1.2 % (ref 0–5)
ERYTHROCYTE [DISTWIDTH] IN BLOOD BY AUTOMATED COUNT: 14.4 % (ref 11.5–14.5)
GLUCOSE BLD-MCNC: 135 MG/DL (ref 70–99)
GLUCOSE BLD-MCNC: 157 MG/DL (ref 70–99)
GLUCOSE BLD-MCNC: 257 MG/DL (ref 70–99)
GLUCOSE BLD-MCNC: 278 MG/DL (ref 70–99)
GLUCOSE BLD-MCNC: 302 MG/DL (ref 70–99)
GLUCOSE BLD-MCNC: 53 MG/DL (ref 70–99)
GLUCOSE SERPL-MCNC: 70 MG/DL (ref 74–109)
HCT VFR BLD AUTO: 38.2 % (ref 37–47)
HGB BLD-MCNC: 11.9 G/DL (ref 12–16)
IMM GRANULOCYTES # BLD: 0.1 K/UL
LYMPHOCYTES # BLD: 2.6 K/UL (ref 1.1–4.5)
LYMPHOCYTES NFR BLD: 16.1 % (ref 20–40)
MCH RBC QN AUTO: 25.1 PG (ref 27–31)
MCHC RBC AUTO-ENTMCNC: 31.2 G/DL (ref 33–37)
MCV RBC AUTO: 80.4 FL (ref 81–99)
MONOCYTES # BLD: 1.6 K/UL (ref 0–0.9)
MONOCYTES NFR BLD: 9.8 % (ref 0–10)
NEUTROPHILS # BLD: 11.8 K/UL (ref 1.5–7.5)
NEUTS SEG NFR BLD: 71.9 % (ref 50–65)
PERFORMED ON: ABNORMAL
PLATELET # BLD AUTO: 453 K/UL (ref 130–400)
PMV BLD AUTO: 8.6 FL (ref 9.4–12.3)
POTASSIUM SERPL-SCNC: 4 MMOL/L (ref 3.5–5)
PROT SERPL-MCNC: 6.6 G/DL (ref 6.6–8.7)
RBC # BLD AUTO: 4.75 M/UL (ref 4.2–5.4)
SODIUM SERPL-SCNC: 134 MMOL/L (ref 136–145)
WBC # BLD AUTO: 16.4 K/UL (ref 4.8–10.8)

## 2024-05-12 PROCEDURE — 6370000000 HC RX 637 (ALT 250 FOR IP): Performed by: INTERNAL MEDICINE

## 2024-05-12 PROCEDURE — 85025 COMPLETE CBC W/AUTO DIFF WBC: CPT

## 2024-05-12 PROCEDURE — 94640 AIRWAY INHALATION TREATMENT: CPT

## 2024-05-12 PROCEDURE — 80053 COMPREHEN METABOLIC PANEL: CPT

## 2024-05-12 PROCEDURE — 99232 SBSQ HOSP IP/OBS MODERATE 35: CPT | Performed by: INTERNAL MEDICINE

## 2024-05-12 PROCEDURE — 2500000003 HC RX 250 WO HCPCS: Performed by: INTERNAL MEDICINE

## 2024-05-12 PROCEDURE — 2140000000 HC CCU INTERMEDIATE R&B

## 2024-05-12 PROCEDURE — 2700000000 HC OXYGEN THERAPY PER DAY

## 2024-05-12 PROCEDURE — 6360000002 HC RX W HCPCS: Performed by: INTERNAL MEDICINE

## 2024-05-12 PROCEDURE — 82962 GLUCOSE BLOOD TEST: CPT

## 2024-05-12 PROCEDURE — 94760 N-INVAS EAR/PLS OXIMETRY 1: CPT

## 2024-05-12 PROCEDURE — 2580000003 HC RX 258: Performed by: INTERNAL MEDICINE

## 2024-05-12 PROCEDURE — 36415 COLL VENOUS BLD VENIPUNCTURE: CPT

## 2024-05-12 RX ORDER — FUROSEMIDE 40 MG/1
40 TABLET ORAL DAILY
Status: DISCONTINUED | OUTPATIENT
Start: 2024-05-12 | End: 2024-05-28

## 2024-05-12 RX ORDER — AMIODARONE HYDROCHLORIDE 200 MG/1
200 TABLET ORAL DAILY
Status: DISCONTINUED | OUTPATIENT
Start: 2024-05-12 | End: 2024-05-13

## 2024-05-12 RX ORDER — GUAIFENESIN/DEXTROMETHORPHAN 100-10MG/5
10 SYRUP ORAL EVERY 6 HOURS
Status: DISPENSED | OUTPATIENT
Start: 2024-05-12 | End: 2024-05-14

## 2024-05-12 RX ORDER — METOPROLOL SUCCINATE 50 MG/1
50 TABLET, EXTENDED RELEASE ORAL 2 TIMES DAILY
Status: DISCONTINUED | OUTPATIENT
Start: 2024-05-12 | End: 2024-05-13

## 2024-05-12 RX ORDER — DILTIAZEM HYDROCHLORIDE 120 MG/1
120 CAPSULE, COATED, EXTENDED RELEASE ORAL 2 TIMES DAILY
Status: DISCONTINUED | OUTPATIENT
Start: 2024-05-12 | End: 2024-05-15

## 2024-05-12 RX ADMIN — METOPROLOL SUCCINATE 50 MG: 50 TABLET, EXTENDED RELEASE ORAL at 19:55

## 2024-05-12 RX ADMIN — BUDESONIDE AND FORMOTEROL FUMARATE DIHYDRATE 2 PUFF: 160; 4.5 AEROSOL RESPIRATORY (INHALATION) at 18:38

## 2024-05-12 RX ADMIN — MICONAZOLE NITRATE: 2 POWDER TOPICAL at 19:56

## 2024-05-12 RX ADMIN — APIXABAN 5 MG: 5 TABLET, FILM COATED ORAL at 19:55

## 2024-05-12 RX ADMIN — LEVALBUTEROL HYDROCHLORIDE 0.63 MG: 0.63 SOLUTION RESPIRATORY (INHALATION) at 22:42

## 2024-05-12 RX ADMIN — LEVALBUTEROL HYDROCHLORIDE 0.63 MG: 0.63 SOLUTION RESPIRATORY (INHALATION) at 07:04

## 2024-05-12 RX ADMIN — DONEPEZIL HYDROCHLORIDE 10 MG: 10 TABLET, FILM COATED ORAL at 19:55

## 2024-05-12 RX ADMIN — DILTIAZEM HYDROCHLORIDE 5 MG/HR: 5 INJECTION, SOLUTION INTRAVENOUS at 05:12

## 2024-05-12 RX ADMIN — DILTIAZEM HYDROCHLORIDE 120 MG: 120 CAPSULE, COATED, EXTENDED RELEASE ORAL at 19:55

## 2024-05-12 RX ADMIN — IPRATROPIUM BROMIDE 2 PUFF: 17 AEROSOL, METERED RESPIRATORY (INHALATION) at 07:12

## 2024-05-12 RX ADMIN — DILTIAZEM HYDROCHLORIDE 240 MG: 240 CAPSULE, EXTENDED RELEASE ORAL at 08:52

## 2024-05-12 RX ADMIN — IPRATROPIUM BROMIDE 2 PUFF: 17 AEROSOL, METERED RESPIRATORY (INHALATION) at 15:12

## 2024-05-12 RX ADMIN — MICONAZOLE NITRATE: 2 POWDER TOPICAL at 15:01

## 2024-05-12 RX ADMIN — Medication 15 G: at 08:52

## 2024-05-12 RX ADMIN — GUAIFENESIN SYRUP AND DEXTROMETHORPHAN 10 ML: 100; 10 SYRUP ORAL at 15:01

## 2024-05-12 RX ADMIN — INSULIN GLARGINE 25 UNITS: 100 INJECTION, SOLUTION SUBCUTANEOUS at 19:54

## 2024-05-12 RX ADMIN — METOPROLOL SUCCINATE 100 MG: 50 TABLET, EXTENDED RELEASE ORAL at 08:52

## 2024-05-12 RX ADMIN — BUDESONIDE AND FORMOTEROL FUMARATE DIHYDRATE 2 PUFF: 160; 4.5 AEROSOL RESPIRATORY (INHALATION) at 07:16

## 2024-05-12 RX ADMIN — Medication 0.5 MG/MIN: at 05:12

## 2024-05-12 RX ADMIN — GUAIFENESIN SYRUP AND DEXTROMETHORPHAN 10 ML: 100; 10 SYRUP ORAL at 19:55

## 2024-05-12 RX ADMIN — AMIODARONE HYDROCHLORIDE 200 MG: 200 TABLET ORAL at 10:11

## 2024-05-12 RX ADMIN — APIXABAN 5 MG: 5 TABLET, FILM COATED ORAL at 08:52

## 2024-05-12 RX ADMIN — INSULIN LISPRO 6 UNITS: 100 INJECTION, SOLUTION INTRAVENOUS; SUBCUTANEOUS at 11:56

## 2024-05-12 RX ADMIN — SENNOSIDES, DOCUSATE SODIUM 1 TABLET: 8.6; 5 TABLET ORAL at 08:52

## 2024-05-12 RX ADMIN — LEVALBUTEROL HYDROCHLORIDE 0.63 MG: 0.63 SOLUTION RESPIRATORY (INHALATION) at 15:03

## 2024-05-12 RX ADMIN — ATORVASTATIN CALCIUM 20 MG: 20 TABLET, FILM COATED ORAL at 08:52

## 2024-05-12 RX ADMIN — IPRATROPIUM BROMIDE 2 PUFF: 17 AEROSOL, METERED RESPIRATORY (INHALATION) at 18:37

## 2024-05-12 RX ADMIN — IPRATROPIUM BROMIDE 2 PUFF: 17 AEROSOL, METERED RESPIRATORY (INHALATION) at 10:23

## 2024-05-12 RX ADMIN — INSULIN GLARGINE 25 UNITS: 100 INJECTION, SOLUTION SUBCUTANEOUS at 08:52

## 2024-05-12 RX ADMIN — FUROSEMIDE 40 MG: 40 TABLET ORAL at 10:10

## 2024-05-12 RX ADMIN — Medication 5000 UNITS: at 08:52

## 2024-05-12 RX ADMIN — INSULIN LISPRO 4 UNITS: 100 INJECTION, SOLUTION INTRAVENOUS; SUBCUTANEOUS at 17:03

## 2024-05-12 NOTE — PROGRESS NOTES
Pt glucose noted to be 70 on am labs. POC test performed with result of 53. Patient alert and at baseline. Pt provided with oral nutrition at this time. Will reassess glucose post oral intake.

## 2024-05-12 NOTE — PLAN OF CARE
Problem: Discharge Planning  Goal: Discharge to home or other facility with appropriate resources  5/12/2024 0935 by Melissa Mcghee RN  Outcome: Progressing  Flowsheets (Taken 5/12/2024 0847)  Discharge to home or other facility with appropriate resources: Identify barriers to discharge with patient and caregiver  5/11/2024 2304 by Tyler Bingham RN  Outcome: Progressing     Problem: Safety - Adult  Goal: Free from fall injury  5/12/2024 0935 by Melissa Mcghee RN  Outcome: Progressing  5/11/2024 2304 by Tyler Bingham RN  Outcome: Progressing     Problem: ABCDS Injury Assessment  Goal: Absence of physical injury  Outcome: Progressing     Problem: Skin/Tissue Integrity  Goal: Absence of new skin breakdown  Description:  Monitor for areas of redness and/or skin breakdown    Outcome: Progressing     Problem: Pain  Goal: Verbalizes/displays adequate comfort level or baseline comfort level  Outcome: Progressing     Problem: Chronic Conditions and Co-morbidities  Goal: Patient's chronic conditions and co-morbidity symptoms are monitored and maintained or improved  Outcome: Progressing  Flowsheets (Taken 5/12/2024 0847)  Care Plan - Patient's Chronic Conditions and Co-Morbidity Symptoms are Monitored and Maintained or Improved: Monitor and assess patient's chronic conditions and comorbid symptoms for stability, deterioration, or improvement     Problem: Neurosensory - Adult  Goal: Achieves stable or improved neurological status  Outcome: Progressing  Flowsheets (Taken 5/12/2024 0847)  Achieves stable or improved neurological status: Assess for and report changes in neurological status     Problem: Respiratory - Adult  Goal: Achieves optimal ventilation and oxygenation  Outcome: Progressing  Flowsheets (Taken 5/12/2024 0847)  Achieves optimal ventilation and oxygenation: Assess for changes in respiratory status     Problem: Cardiovascular - Adult  Goal: Maintains optimal cardiac output and hemodynamic

## 2024-05-12 NOTE — PLAN OF CARE
Problem: Discharge Planning  Goal: Discharge to home or other facility with appropriate resources  5/11/2024 2304 by Tyler Bingham, RN  Outcome: Progressing  5/11/2024 0941 by Melissa Mcghee RN  Outcome: Progressing  Flowsheets (Taken 5/11/2024 0832)  Discharge to home or other facility with appropriate resources: Identify barriers to discharge with patient and caregiver     Problem: Safety - Adult  Goal: Free from fall injury  5/11/2024 2304 by Tyler Bingham, RN  Outcome: Progressing  5/11/2024 0941 by Melissa Mcghee, RN  Outcome: Progressing     Problem: ABCDS Injury Assessment  Goal: Absence of physical injury  5/11/2024 0941 by Melissa Mcghee, RN  Outcome: Progressing

## 2024-05-12 NOTE — PROGRESS NOTES
Date:2024  Patient: Edie Cat  : 1945  MRN:[id number]  CODE:                                                                        PCP:Dudley Eduardo    Admit Date: 2024  7:17 PM   LOS: 10 days     Hospital course :  Ms. Wells was admitted for COVID 19 infection, mental status change, and hyponatremia.    She was initially admitted to CCU.  She had minimal symptoms regarding her COVID and this cleared quickly.  Her sodium remained low in spite of maximum medical management.  On , she was dosed with Samsca and had improvement in her sodium.  Later on with low sodium started on urea and the sodium improved along with fluid restriction  On  the sodium improved to 133 and at the time of discharge back to SNF the patient developed shortness of breath and started treatment for COPD so the discharge was held.  With treatment COPD resolved however found to have atelectasis and started on incentive spirometry and out of bed to chair  Treated for hyperkalemia on   Developed A-fib with RVR consulted cardiology for further evaluation, medication adjusted during this admission with Coumadin changed to Eliquis for ease of anticoagulation.   On  developed severe tachycardia found to have A-fib with RVR consulted cardiology and transferred to PCU treated with IV Cardizem and amiodarone and finally RVR well-controlled.       Subjective:    seen and evaluated at bedside discussed about atelectasis and encourage incentive spirometry also lying on the sides while in the bed.  Complain of intermittent cough started on Robitussin DM.  Presently heart rate around 60 cardiology adjusting medication possible discharge tomorrow.      Review of Systems    Reviewed 12 system and found most of them negative except as stated above in subjective note    Objective:      Vital signs in last 24 hours:  Patient Vitals for the past 24 hrs:   BP Temp Temp src Pulse Resp SpO2   24 1156 (!) 141/73 99.3 °F

## 2024-05-12 NOTE — PROGRESS NOTES
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     Priority: Low    Arthritis, degenerative 09/26/2011     Priority: Low    Dementia (HCC) 09/26/2011     Priority: Low    Former cigarette smoker 09/26/2011     Priority: Low    Psoriasis 09/26/2011     Priority: Low    Hyponatremia 09/26/2011     Priority: Low    Vitamin D deficiency 09/26/2011     Priority: Low    Atrial fibrillation (HCC) 08/12/2011     Priority: Low     Overview Note:     8/12/2011 DCCV on aminodarone      CAD (coronary artery disease) 08/12/2011     Priority: Low     Overview Note:     8/15/2011  cardiolyte negative for myocardial ischemia, EF  66%         Admit Date:  5/2/2024    Admission Problem List: Present on Admission:   (Resolved) COVID-19      Cardiac Specific Data:  Specialty Problems          Cardiology Problems    Atrial fibrillation (HCC)        CAD (coronary artery disease)        Pulmonary embolus (HCC)        Systemic hypertension         1.  Persistent atrial flutter, atypical, on amiodarone and Coumadin, normal LV systolic function with moderate left atrial enlargement, mild aortic stenosis.  2.  Moderate dementia.  3.  It is requiring diabetes mellitus.  4.  COVID infection.    Subjective:  Ms. Cat appears much better rate controlled this morning.  Noted slow ventricular response intermittently this afternoon.    Objective:   BP (!) 140/57   Pulse (!) 43    1 % GEL Apply 2 g topically 3 times daily as needed for Pain   Yes Provider, MD Johnnie   metFORMIN (GLUCOPHAGE-XR) 500 MG extended release tablet Take 2 tablets by mouth daily (with breakfast)   Yes Provider, MD Johnnie   donepezil (ARICEPT) 10 MG tablet Take 10 mg by mouth nightly.      Provider, MD Johnnie   Cholecalciferol (VITAMIN D) 2000 UNIT CAPS capsule Take  by mouth.      Provider, MD Johnnie        furosemide  40 mg Oral Daily    amiodarone  200 mg Oral Daily    miconazole   Topical BID    guaiFENesin-dextromethorphan  10 mL Oral Q6H    dilTIAZem  120 mg Oral BID    metoprolol succinate  50 mg Oral BID    levalbuterol  0.63 mg Nebulization Q8H RT    insulin glargine  25 Units SubCUTAneous BID    budesonide-formoterol  2 puff Inhalation BID RT    ipratropium  2 puff Inhalation Q4H WA RT    apixaban  5 mg Oral BID    [Held by provider] insulin lispro  6 Units SubCUTAneous TID WC    vitamin D  50,000 Units Oral Weekly    vitamin D  5,000 Units Oral Daily    donepezil  10 mg Oral Nightly    insulin lispro  0-8 Units SubCUTAneous TID WC    insulin lispro  0-4 Units SubCUTAneous Nightly    sennosides-docusate sodium  1 tablet Oral Daily    atorvastatin  20 mg Oral Daily       TELEMETRY: Atrial flutter     Physical Exam:      Physical Exam  Constitutional:       General: She is not in acute distress.     Appearance: She is obese. She is not diaphoretic.   HENT:      Mouth/Throat:      Pharynx: No oropharyngeal exudate.   Eyes:      General: No scleral icterus.        Right eye: No discharge.         Left eye: No discharge.   Neck:      Thyroid: No thyromegaly.      Vascular: No JVD.   Cardiovascular:      Rate and Rhythm: Normal rate. Rhythm irregular. No extrasystoles are present.     Heart sounds: Normal heart sounds, S1 normal and S2 normal. No murmur heard.     No systolic murmur is present.      No diastolic murmur is present.      No friction rub. No gallop. No S3 or S4 sounds.

## 2024-05-12 NOTE — PROGRESS NOTES
Patient's heart rate frequently dropping into the upper 30's. Patient c/o shortness of breath on assessment. Dr. Perkins notified. EKG obtained. Medications adjusted per Dr. Perkins (see MAR). Telemetry monitoring and plan of care continued.

## 2024-05-13 LAB
ALBUMIN SERPL-MCNC: 2.8 G/DL (ref 3.5–5.2)
ALP SERPL-CCNC: 77 U/L (ref 35–104)
ALT SERPL-CCNC: 22 U/L (ref 5–33)
ANION GAP SERPL CALCULATED.3IONS-SCNC: 11 MMOL/L (ref 7–19)
AST SERPL-CCNC: 10 U/L (ref 5–32)
BASOPHILS # BLD: 0 K/UL (ref 0–0.2)
BASOPHILS NFR BLD: 0.1 % (ref 0–1)
BILIRUB SERPL-MCNC: 0.5 MG/DL (ref 0.2–1.2)
BUN SERPL-MCNC: 48 MG/DL (ref 8–23)
CALCIUM SERPL-MCNC: 8.5 MG/DL (ref 8.8–10.2)
CHLORIDE SERPL-SCNC: 96 MMOL/L (ref 98–111)
CO2 SERPL-SCNC: 30 MMOL/L (ref 22–29)
CREAT SERPL-MCNC: 1 MG/DL (ref 0.5–0.9)
EOSINOPHIL # BLD: 0.3 K/UL (ref 0–0.6)
EOSINOPHIL NFR BLD: 1.7 % (ref 0–5)
ERYTHROCYTE [DISTWIDTH] IN BLOOD BY AUTOMATED COUNT: 14.7 % (ref 11.5–14.5)
GLUCOSE BLD-MCNC: 128 MG/DL (ref 70–99)
GLUCOSE BLD-MCNC: 146 MG/DL (ref 70–99)
GLUCOSE BLD-MCNC: 195 MG/DL (ref 70–99)
GLUCOSE BLD-MCNC: 268 MG/DL (ref 70–99)
GLUCOSE BLD-MCNC: 277 MG/DL (ref 70–99)
GLUCOSE BLD-MCNC: 302 MG/DL (ref 70–99)
GLUCOSE BLD-MCNC: 69 MG/DL (ref 70–99)
GLUCOSE SERPL-MCNC: 58 MG/DL (ref 74–109)
HCT VFR BLD AUTO: 36.1 % (ref 37–47)
HGB BLD-MCNC: 11.3 G/DL (ref 12–16)
IMM GRANULOCYTES # BLD: 0.1 K/UL
LYMPHOCYTES # BLD: 1.7 K/UL (ref 1.1–4.5)
LYMPHOCYTES NFR BLD: 10.4 % (ref 20–40)
MCH RBC QN AUTO: 25.2 PG (ref 27–31)
MCHC RBC AUTO-ENTMCNC: 31.3 G/DL (ref 33–37)
MCV RBC AUTO: 80.6 FL (ref 81–99)
MONOCYTES # BLD: 1.5 K/UL (ref 0–0.9)
MONOCYTES NFR BLD: 8.9 % (ref 0–10)
NEUTROPHILS # BLD: 13.1 K/UL (ref 1.5–7.5)
NEUTS SEG NFR BLD: 78.3 % (ref 50–65)
PERFORMED ON: ABNORMAL
PLATELET # BLD AUTO: 414 K/UL (ref 130–400)
PMV BLD AUTO: 8.5 FL (ref 9.4–12.3)
POTASSIUM SERPL-SCNC: 4 MMOL/L (ref 3.5–5)
PROT SERPL-MCNC: 5.9 G/DL (ref 6.6–8.7)
RBC # BLD AUTO: 4.48 M/UL (ref 4.2–5.4)
SODIUM SERPL-SCNC: 137 MMOL/L (ref 136–145)
WBC # BLD AUTO: 16.7 K/UL (ref 4.8–10.8)

## 2024-05-13 PROCEDURE — 36415 COLL VENOUS BLD VENIPUNCTURE: CPT

## 2024-05-13 PROCEDURE — 99232 SBSQ HOSP IP/OBS MODERATE 35: CPT | Performed by: INTERNAL MEDICINE

## 2024-05-13 PROCEDURE — 6370000000 HC RX 637 (ALT 250 FOR IP): Performed by: INTERNAL MEDICINE

## 2024-05-13 PROCEDURE — 2700000000 HC OXYGEN THERAPY PER DAY

## 2024-05-13 PROCEDURE — 85025 COMPLETE CBC W/AUTO DIFF WBC: CPT

## 2024-05-13 PROCEDURE — 94760 N-INVAS EAR/PLS OXIMETRY 1: CPT

## 2024-05-13 PROCEDURE — 94640 AIRWAY INHALATION TREATMENT: CPT

## 2024-05-13 PROCEDURE — 97530 THERAPEUTIC ACTIVITIES: CPT

## 2024-05-13 PROCEDURE — 80053 COMPREHEN METABOLIC PANEL: CPT

## 2024-05-13 PROCEDURE — 99222 1ST HOSP IP/OBS MODERATE 55: CPT | Performed by: INTERNAL MEDICINE

## 2024-05-13 PROCEDURE — 6360000002 HC RX W HCPCS: Performed by: INTERNAL MEDICINE

## 2024-05-13 PROCEDURE — 97535 SELF CARE MNGMENT TRAINING: CPT

## 2024-05-13 PROCEDURE — 2140000000 HC CCU INTERMEDIATE R&B

## 2024-05-13 PROCEDURE — 82962 GLUCOSE BLOOD TEST: CPT

## 2024-05-13 RX ORDER — ONDANSETRON 2 MG/ML
4 INJECTION INTRAMUSCULAR; INTRAVENOUS EVERY 6 HOURS PRN
Status: DISCONTINUED | OUTPATIENT
Start: 2024-05-13 | End: 2024-05-31 | Stop reason: HOSPADM

## 2024-05-13 RX ORDER — INSULIN GLARGINE 100 [IU]/ML
15 INJECTION, SOLUTION SUBCUTANEOUS 2 TIMES DAILY
Status: DISCONTINUED | OUTPATIENT
Start: 2024-05-13 | End: 2024-05-14

## 2024-05-13 RX ORDER — METOPROLOL SUCCINATE 50 MG/1
100 TABLET, EXTENDED RELEASE ORAL 2 TIMES DAILY
Status: DISCONTINUED | OUTPATIENT
Start: 2024-05-13 | End: 2024-05-16 | Stop reason: DRUGHIGH

## 2024-05-13 RX ORDER — SENNA AND DOCUSATE SODIUM 50; 8.6 MG/1; MG/1
1 TABLET, FILM COATED ORAL DAILY PRN
Status: DISCONTINUED | OUTPATIENT
Start: 2024-05-13 | End: 2024-05-31 | Stop reason: HOSPADM

## 2024-05-13 RX ADMIN — ACETAMINOPHEN 650 MG: 325 TABLET ORAL at 20:32

## 2024-05-13 RX ADMIN — INSULIN LISPRO 4 UNITS: 100 INJECTION, SOLUTION INTRAVENOUS; SUBCUTANEOUS at 12:43

## 2024-05-13 RX ADMIN — FUROSEMIDE 40 MG: 40 TABLET ORAL at 08:39

## 2024-05-13 RX ADMIN — BUDESONIDE AND FORMOTEROL FUMARATE DIHYDRATE 2 PUFF: 160; 4.5 AEROSOL RESPIRATORY (INHALATION) at 08:04

## 2024-05-13 RX ADMIN — MICONAZOLE NITRATE: 2 POWDER TOPICAL at 20:36

## 2024-05-13 RX ADMIN — ATORVASTATIN CALCIUM 20 MG: 20 TABLET, FILM COATED ORAL at 08:38

## 2024-05-13 RX ADMIN — APIXABAN 5 MG: 5 TABLET, FILM COATED ORAL at 20:34

## 2024-05-13 RX ADMIN — IPRATROPIUM BROMIDE 2 PUFF: 17 AEROSOL, METERED RESPIRATORY (INHALATION) at 08:04

## 2024-05-13 RX ADMIN — IPRATROPIUM BROMIDE 2 PUFF: 17 AEROSOL, METERED RESPIRATORY (INHALATION) at 10:54

## 2024-05-13 RX ADMIN — GUAIFENESIN SYRUP AND DEXTROMETHORPHAN 10 ML: 100; 10 SYRUP ORAL at 20:34

## 2024-05-13 RX ADMIN — PSYLLIUM HUSK 1 PACKET: 3.4 POWDER ORAL at 12:44

## 2024-05-13 RX ADMIN — APIXABAN 5 MG: 5 TABLET, FILM COATED ORAL at 08:39

## 2024-05-13 RX ADMIN — INSULIN GLARGINE 15 UNITS: 100 INJECTION, SOLUTION SUBCUTANEOUS at 20:33

## 2024-05-13 RX ADMIN — Medication 5000 UNITS: at 08:39

## 2024-05-13 RX ADMIN — GUAIFENESIN SYRUP AND DEXTROMETHORPHAN 10 ML: 100; 10 SYRUP ORAL at 17:05

## 2024-05-13 RX ADMIN — GUAIFENESIN SYRUP AND DEXTROMETHORPHAN 10 ML: 100; 10 SYRUP ORAL at 08:38

## 2024-05-13 RX ADMIN — IPRATROPIUM BROMIDE 2 PUFF: 17 AEROSOL, METERED RESPIRATORY (INHALATION) at 18:54

## 2024-05-13 RX ADMIN — METOPROLOL SUCCINATE 50 MG: 50 TABLET, EXTENDED RELEASE ORAL at 08:38

## 2024-05-13 RX ADMIN — LEVALBUTEROL HYDROCHLORIDE 0.63 MG: 0.63 SOLUTION RESPIRATORY (INHALATION) at 22:27

## 2024-05-13 RX ADMIN — METOPROLOL SUCCINATE 100 MG: 50 TABLET, EXTENDED RELEASE ORAL at 20:34

## 2024-05-13 RX ADMIN — DILTIAZEM HYDROCHLORIDE 120 MG: 120 CAPSULE, COATED, EXTENDED RELEASE ORAL at 20:34

## 2024-05-13 RX ADMIN — INSULIN GLARGINE 15 UNITS: 100 INJECTION, SOLUTION SUBCUTANEOUS at 08:38

## 2024-05-13 RX ADMIN — INSULIN LISPRO 4 UNITS: 100 INJECTION, SOLUTION INTRAVENOUS; SUBCUTANEOUS at 20:33

## 2024-05-13 RX ADMIN — DICLOFENAC SODIUM 2 G: 10 GEL TOPICAL at 03:46

## 2024-05-13 RX ADMIN — DONEPEZIL HYDROCHLORIDE 10 MG: 10 TABLET, FILM COATED ORAL at 20:33

## 2024-05-13 RX ADMIN — DILTIAZEM HYDROCHLORIDE 120 MG: 120 CAPSULE, COATED, EXTENDED RELEASE ORAL at 08:39

## 2024-05-13 RX ADMIN — Medication 16 G: at 03:45

## 2024-05-13 RX ADMIN — GUAIFENESIN SYRUP AND DEXTROMETHORPHAN 10 ML: 100; 10 SYRUP ORAL at 02:48

## 2024-05-13 RX ADMIN — AMIODARONE HYDROCHLORIDE 200 MG: 200 TABLET ORAL at 08:39

## 2024-05-13 RX ADMIN — LEVALBUTEROL HYDROCHLORIDE 0.63 MG: 0.63 SOLUTION RESPIRATORY (INHALATION) at 14:37

## 2024-05-13 RX ADMIN — LEVALBUTEROL HYDROCHLORIDE 0.63 MG: 0.63 SOLUTION RESPIRATORY (INHALATION) at 06:50

## 2024-05-13 RX ADMIN — IPRATROPIUM BROMIDE 2 PUFF: 17 AEROSOL, METERED RESPIRATORY (INHALATION) at 14:41

## 2024-05-13 RX ADMIN — BUDESONIDE AND FORMOTEROL FUMARATE DIHYDRATE 2 PUFF: 160; 4.5 AEROSOL RESPIRATORY (INHALATION) at 18:54

## 2024-05-13 RX ADMIN — MICONAZOLE NITRATE: 2 POWDER TOPICAL at 08:38

## 2024-05-13 RX ADMIN — ACETAMINOPHEN 650 MG: 325 TABLET ORAL at 02:48

## 2024-05-13 NOTE — PROGRESS NOTES
4 Eyes Skin Assessment     NAME:  Edie Cat  YOB: 1945  MEDICAL RECORD NUMBER:  [id number]    The patient is being assessed for  Transfer to New Unit    I agree that at least one RN has performed a thorough Head to Toe Skin Assessment on the patient. ALL assessment sites listed below have been assessed.      Areas assessed by both nurses:    Head, Face, Ears, Shoulders, Back, Chest, Arms, Elbows, Hands, Sacrum. Buttock, Coccyx, Ischium, Legs. Feet and Heels, and Under Medical Devices         Does the Patient have a Wound? Yes wound(s) were present on assessment. LDA wound assessment was Initiated and completed by RN       Bryon Prevention initiated by RN: Yes  Wound Care Orders initiated by RN: Yes    Pressure Injury (Stage 3,4, Unstageable, DTI, NWPT, and Complex wounds) if present, place Wound referral order by RN under : Yes    New Ostomies, if present place, Ostomy referral order under : No     Nurse 1 eSignature: Electronically signed by Stephany Marsh RN on 5/13/24 at 6:50 AM CDT    **SHARE this note so that the co-signing nurse can place an eSignature**    Nurse 2 eSignature: {Esignature:[id number]}

## 2024-05-13 NOTE — PROGRESS NOTES
present.      No diastolic murmur is present.      No friction rub. No gallop. No S3 or S4 sounds.      Comments: No JVD  No pitting edema  Pulmonary:      Effort: Pulmonary effort is normal. No respiratory distress.      Breath sounds: Normal breath sounds. No wheezing or rales.   Chest:      Chest wall: No tenderness.   Abdominal:      General: Bowel sounds are normal. There is no distension.      Palpations: Abdomen is soft. There is no mass.      Tenderness: There is no abdominal tenderness. There is no guarding or rebound.      Hernia: No hernia is present.      Comments: No palpable organomegaly   Musculoskeletal:         General: Normal range of motion.   Skin:     General: Skin is warm.      Coloration: Skin is not pale.      Findings: No rash.   Neurological:      Mental Status: She is alert and oriented to person, place, and time.      Cranial Nerves: No cranial nerve deficit.      Deep Tendon Reflexes: Reflexes normal.                 Lab Data:  CBC:   Recent Labs     05/11/24 0127 05/12/24 0115 05/13/24 0137   WBC 18.1* 16.4* 16.7*   HGB 10.7* 11.9* 11.3*   HCT 34.5* 38.2 36.1*   MCV 80.0* 80.4* 80.6*   * 453* 414*     BMP:   Recent Labs     05/11/24 0127 05/12/24 0115 05/13/24 0137   * 134* 137   K 4.7 4.0 4.0   CL 92* 94* 96*   CO2 27 29 30*   BUN 54* 48* 48*   CREATININE 1.0* 1.0* 1.0*     LIVER PROFILE:   Recent Labs     05/11/24 0127 05/12/24 0115 05/13/24 0137   AST 12 13 10   ALT 20 23 22   BILITOT 0.3 0.4 0.5   ALKPHOS 81 82 77     PT/INR: No results for input(s): \"PROTIME\", \"INR\" in the last 72 hours.  APTT: No results for input(s): \"APTT\" in the last 72 hours.  BNP:  No results for input(s): \"BNP\" in the last 72 hours.  CK, CKMB, Troponin: @LABRCNT (CKTOTAL:3, CKMB:3, TROPONINI:3)@    IMAGING:  Echo (TTE) complete (PRN contrast/bubble/strain/3D)    Result Date: 5/9/2024    Left Ventricle: Low normal left ventricular systolic function with a visually estimated EF of 50 -      1.  She had been in atrial flutter since presentation and had been on Coumadin as well as amiodarone on admission though she was subtherapeutic.  Will plan for rate control strategy.  Discontinue oral amiodarone.  Can increase Toprol-XL to 100 mg twice daily.  If rates not well-controlled would trial Cardizem CD at 180 mg twice daily (previously on 240 twice daily).  Maintain on Eliquis.    Cruz Perkins MD, MD 5/13/2024 6:40 PM    Thisdictation was generated by voice recognition computer software.  Although all attempts are made to edit the dictation for accuracy, there may be errors in the transcription that are not intended.

## 2024-05-13 NOTE — CONSULTS
Cardiology Consult Note    ADMISSION DAY:  2024  7:17 PM   Consult Date:  2024 5:11 PM    Reason for Consultation: Atrial fibrillation    History of Present Illness: 78-year-old female presented with illness related to COVID-19 and developed atrial fibrillation and atrial flutter during her admission.  The flutter has been difficult to rate control with occasional borderline blood pressures.  Patient has remained largely symptomatic and notes that she has had atrial fibrillation for some time and [facility] but was [facility] taken off due to high and low INRs majority of the time.  She occasionally feels tachycardia but overall is largely asymptomatic.    Allergies   Allergen Reactions    Amoxicillin      Reaction unknown       Current Medications  Current Facility-Administered Medications: ondansetron (ZOFRAN) injection 4 mg, 4 mg, IntraVENous, Q6H PRN  insulin glargine (LANTUS) injection vial 15 Units, 15 Units, SubCUTAneous, BID  sennosides-docusate sodium (SENOKOT-S) 8.6-50 MG tablet 1 tablet, 1 tablet, Oral, Daily PRN  psyllium husk-aspartame (METAMUCIL FIBER) packet 1 packet, 1 packet, Oral, TID WC  furosemide (LASIX) tablet 40 mg, 40 mg, Oral, Daily  amiodarone (CORDARONE) tablet 200 mg, 200 mg, Oral, Daily  miconazole (MICOTIN) 2 % powder, , Topical, BID  guaiFENesin-dextromethorphan (ROBITUSSIN DM) 100-10 MG/5ML syrup 10 mL, 10 mL, Oral, Q6H  dilTIAZem (CARDIZEM CD) extended release capsule 120 mg, 120 mg, Oral, BID  metoprolol succinate (TOPROL XL) extended release tablet 50 mg, 50 mg, Oral, BID  [COMPLETED] amiodarone (CORDARONE) 150 mg in dextrose 5 % 100 mL bolus, 150 mg, IntraVENous, Once **FOLLOWED BY** [] amiodarone (CORDARONE) 450 mg in dextrose 5% 250 mL infusion, 1 mg/min, IntraVENous, Continuous **FOLLOWED BY** amiodarone (CORDARONE) 450 mg in dextrose 5% 250 mL infusion, 0.5 mg/min, IntraVENous, Continuous  [COMPLETED] dilTIAZem injection 10 mg, 10 mg,  COVID, Dementia (HCC), Diabetes mellitus (HCC), Hx of blood clots, Hyperlipidemia, Hypertension, Knee arthroplasty, Thyroid disease, and TIA (transient ischemic attack).    Past Surgical History   has a past surgical history that includes Total knee arthroplasty (08/09/2011); Appendectomy; Cholecystectomy; and Abdomen surgery.    Social History   reports that she has never smoked. She has never used smokeless tobacco. She reports that she does not currently use alcohol. She reports that she does not use drugs.    Family History  family history is not on file.    Review of Systems:   Negative except as noted in HPI, 10 organ review performed. The ten organ systems reviewed included: cardiac, respiratory, eyes, constitutional, gastrointestinal, genitourinary, musculoskeletal, neurologic, integumentary, and ear-nose-mouth-throat.    Physical Examination:    Vitals:    05/13/24 0758 05/13/24 0830 05/13/24 1147 05/13/24 1619   BP: 109/89  115/70 117/78   Pulse: 92 (!) 103 (!) 111 (!) 124   Resp: 18  18 18   Temp: 97.5 °F (36.4 °C)  98.1 °F (36.7 °C) 97.3 °F (36.3 °C)   TempSrc: Temporal  Temporal    SpO2: 97%  97% 91%   Weight:       Height:         General - She is a well-developed, well-nourished.   HEENT - Ears and nose are normal.   Moist mucous membranes.  Hearing is normal.  EYES -  No conjunctival hemorrhages or discharge. PERRLA.  Neck - no thyromegaly, JVP is neg,   Respiratory - respirations are not labored, coarse  Cardiovascular - S1S2 irreg heart sounds.  No S3,S4   Abdominal -  Soft.  Bowel sounds present.  Nontender. No hernia.    Extremities - no edema.   Musculoskeletal -  No cyanosis or clubbing.  Skin -  No statis ulcers or dermatitis.  Warm, normal turgor.  Neuro/Psych -  She is alert and oriented and answered questions appropriately with normal affect and mood.    Data:  I/O last 3 completed shifts:  In: 577.4 [P.O.:577.4]  Out: 2750 [Urine:2750]   Date 05/13/24 0000 - 05/13/24 2359   Shift [id number]

## 2024-05-13 NOTE — CARE COORDINATION
05/13/24 1117   IMM Letter   IMM Letter given to Patient/Family/Significant other/Guardian/POA/by: wade layne sw   IMM Letter date given: 05/13/24   IMM Letter time given: 1101     Second IMM given to patient and explained with patient verbalizing understanding.  All questions and concerns addressed     Signed letter placed in pt soft chart   Patient declined waiting 4 hr period prior to discharge.   Electronically signed by Wade Layne on 5/13/2024 at 11:18 AM

## 2024-05-13 NOTE — PROGRESS NOTES
Occupational Therapy     05/13/24 1500   Subjective   Subjective Pt in bed upon arrival for therapy. Pt agreeable to participate.   Pain Assessment   Pain Assessment None - Denies Pain   Vitals   O2 Device Nasal cannula   Comment Coninuous cough throughout tx.   Cognition   Overall Cognitive Status Exceptions   Orientation   Overall Orientation Status WFL   Bed Mobility Training   Bed Mobility Training Yes   Overall Level of Assistance Minimum assistance;Moderate assistance   Interventions Verbal cues;Tactile cues;Manual cues   Supine to Sit Minimum assistance;Moderate assistance   Scooting Minimum assistance   Transfer Training   Transfer Training Yes   Overall Level of Assistance Minimum assistance;Moderate assistance  (Bridgette Stedy)   Interventions Verbal cues;Tactile cues;Manual cues   Sit to Stand Minimum assistance;Moderate assistance  (Bridgette Stedy)   Stand to Sit Minimum assistance;Moderate assistance  (in Bridgette Stedy)   Bed to Chair Minimum assistance;Moderate assistance  (Bridgette Stedy)   Balance   Sitting Intact   Standing With support  (Bridgette Stedy)   ADL   Feeding Independent   Grooming Independent;Setup   UE Bathing Minimal assistance   LE Bathing Maximum assistance   UE Dressing Minimal assistance   LE Dressing Maximum assistance   Toileting Maximum assistance;Dependent/Total   Toileting Skilled Clinical Factors Incontinent   Functional Mobility Minimal assistance;Moderate assistance   Functional Mobility Skilled Clinical Factors Bridgette Nato  (Bridgette Stedy)   Assessment   Assessment Tx focused on sitting EOB and attempts to stand at EOB at EOB at RW level but patient was unable to fully stand on 2 tries. Bridgette Stedy used for standing and transfer to recliner this date. Pt set up for h/g tasks while in recliner. All needs in reach.   Activity Tolerance Patient limited by fatigue   Discharge Recommendations Patient would benefit from continued therapy after discharge;24 hour supervision or assist   Occupational  Therapy Plan   Times Per Week 3-5   Times Per Day Once a day   OT Plan of Care   Monday X     Electronically signed by SLAVA Junior on 5/13/2024 at 3:58 PM

## 2024-05-13 NOTE — PROGRESS NOTES
Critical lab BS 59 called, POCT 69, oral glucose tablets given recheck after 15 min POCT 128. No complaints from patient at this time WCTM

## 2024-05-13 NOTE — PROGRESS NOTES
70 - 99 mg/dl    Performed on [employer]    POCT Glucose    Collection Time: 05/13/24  8:00 AM   Result Value Ref Range    POC Glucose 146 (H) 70 - 99 mg/dl    Performed on [employer]    POCT Glucose    Collection Time: 05/13/24 11:46 AM   Result Value Ref Range    POC Glucose 277 (H) 70 - 99 mg/dl    Performed on [employer]         I/O last 3 completed shifts:  In: 577.4 [P.O.:577.4]  Out: 2750 [Urine:2750]     insulin glargine  15 Units SubCUTAneous BID    psyllium husk-aspartame  1 packet Oral TID WC    furosemide  40 mg Oral Daily    amiodarone  200 mg Oral Daily    miconazole   Topical BID    guaiFENesin-dextromethorphan  10 mL Oral Q6H    dilTIAZem  120 mg Oral BID    metoprolol succinate  50 mg Oral BID    levalbuterol  0.63 mg Nebulization Q8H RT    budesonide-formoterol  2 puff Inhalation BID RT    ipratropium  2 puff Inhalation Q4H WA RT    apixaban  5 mg Oral BID    vitamin D  50,000 Units Oral Weekly    vitamin D  5,000 Units Oral Daily    donepezil  10 mg Oral Nightly    insulin lispro  0-8 Units SubCUTAneous TID WC    insulin lispro  0-4 Units SubCUTAneous Nightly    atorvastatin  20 mg Oral Daily           I have reviewed the patient's daily labs, including BMP and CBC with pertinent results discussed below.     Reviewed imaging     Assessment & Plan     Acute hypoxia likely from COPD  Likely exacerbation of COPD from COVID-19  Will continue on ipratropium and albuterol puffs every 4 hour  Budesonide and formoterol puff twice a day  With a goal saturation between 88 to 92% with a history of COPD  5/7 given Solu-Medrol 125 mg> every 6 hourly> every 12 hours> daily> discontinued  5/10 presently no wheezing we will discontinue Solu-Medrol  5/12 with intermittent coughing started on Robitussin DM every 6 hourly and monitor for sedation  5/13 the cough is better controlled no sedation observed    Bilateral atelectasis likely from deconditioning  Chest x-ray reviewed  Out of bed to chair  Incentive  spirometry    Hyperkalemia potassium 5.4  5/8 started on Lokelma 5 mg p.o. 3 times daily  Will continue to monitor BMP  5/9 the potassium is 5.8  5/10 potassium normal> 5    COVID-19.  Minimal symptoms.  Continue medical management.     Hyponatremia euvolemic likely SIADH  Samsca dosed 5/6.  On fluid restriction  5/9 sodium slowly trending down 133, 129, 127, 129, 134  5/10 urine sodium 46, urine osmolality 612 and serum osmolality 300  Continue 1 L of fluid restriction and started on urea salt 15 g daily  5/11 hyponatremia resolving with fluid restriction and urea salt  5/12 hyponatremia resolved, continue fluid restriction and discontinue urea salt    Atrial fibrillation with rapid ventricular response.  Rate control and anticoagulation.  Continue oral diltiazem.  Continue amiodarone.  Change to Eliquis for ease on therapy.  5/9 initially A-fib was rate controlled but presently having heart rate around 140  Given Cardizem bolus, consult cardiology for further evaluation  5/10 cardiology input appreciated presently rate controlled strategy  On Toprol-XL 75 mg p.o. twice daily  Cardizem 120 mg p.o. twice daily  Digoxin  125 mcg daily  5/11 suboptimal controlled of heart rate  So started on Cardizem IV drip  Followed by amiodarone IV drip  5/13 the heart rate more than 100 cardiology following with medication adjustment    Hypoglycemia likely due to diabetes mellitus type 2  On Lantus 25 unit twice daily> adjusted accordingly  With insulin 6 units with meals> discontinued  Hypoglycemic protocol  Continue on consistent carbohydrate diet  5/12 discontinue 6 units with meals, continue Lantus as before and continue on scale coverage  5/13 another episode of hypoglycemia of 69, reduce Lantus 15 units twice daily and continue on scale coverage  Encourage adequate oral intake    Likely constipation due to slow colonic transit  Change Senokot as needed  5/13 started on Metamucil p.o. 3 times daily    DVT prophylaxis:

## 2024-05-13 NOTE — PLAN OF CARE
Problem: Discharge Planning  Goal: Discharge to home or other facility with appropriate resources  Outcome: Progressing     Problem: Safety - Adult  Goal: Free from fall injury  Outcome: Progressing     Problem: ABCDS Injury Assessment  Goal: Absence of physical injury  Outcome: Progressing     Problem: Skin/Tissue Integrity  Goal: Absence of new skin breakdown  Description:  Monitor for areas of redness and/or skin breakdown    Outcome: Progressing     Problem: Pain  Goal: Verbalizes/displays adequate comfort level or baseline comfort level  Outcome: Progressing     Problem: Chronic Conditions and Co-morbidities  Goal: Patient's chronic conditions and co-morbidity symptoms are monitored and maintained or improved  Outcome: Progressing     Problem: Neurosensory - Adult  Goal: Achieves stable or improved neurological status  Outcome: Progressing     Problem: Respiratory - Adult  Goal: Achieves optimal ventilation and oxygenation  Outcome: Progressing     Problem: Cardiovascular - Adult  Goal: Maintains optimal cardiac output and hemodynamic stability  Outcome: Progressing     Problem: Skin/Tissue Integrity - Adult  Goal: Skin integrity remains intact  Outcome: Progressing     Problem: Musculoskeletal - Adult  Goal: Return mobility to safest level of function  Outcome: Progressing     Problem: Gastrointestinal - Adult  Goal: Minimal or absence of nausea and vomiting  Outcome: Progressing     Problem: Genitourinary - Adult  Goal: Absence of urinary retention  Outcome: Progressing     Problem: Infection - Adult  Goal: Absence of infection at discharge  Outcome: Progressing     Problem: Metabolic/Fluid and Electrolytes - Adult  Goal: Electrolytes maintained within normal limits  Outcome: Progressing     Problem: Anxiety  Goal: Will report anxiety at manageable levels  Description: INTERVENTIONS:  1. Administer medication as ordered  2. Teach and rehearse alternative coping skills  3. Provide emotional support with 1:1  interaction with staff  Outcome: Progressing     Problem: Coping  Goal: Pt/Family able to verbalize concerns and demonstrate effective coping strategies  Description: INTERVENTIONS:  1. Assist patient/family to identify coping skills, available support systems and cultural and spiritual values  2. Provide emotional support, including active listening and acknowledgement of concerns of patient and caregivers  3. Reduce environmental stimuli, as able  4. Instruct patient/family in relaxation techniques, as appropriate  5. Assess for spiritual pain/suffering and initiate Spiritual Care, Psychosocial Clinical Specialist consults as needed  Outcome: Progressing     Problem: Change in Body Image  Goal: Pt/Family communicate acceptance of loss or change in body image and feel psychological comfort and peace  Description: INTERVENTIONS:  1. Assess patient/family anxiety and grief process related to change in body image, loss of functional status, loss of sense of self, and forgiveness  2. Provide emotional and spiritual support  3. Provide information about the patient's health status with consideration of family and cultural values  4. Communicate willingness to discuss loss and facilitate grief process with patient/family as appropriate  5. Emphasize sustaining relationships within family system and community, or jeff/spiritual traditions  6. Initiate Spiritual Care, Psychosocial Clinical Specialist consult as needed  Outcome: Progressing     Problem: Decision Making  Goal: Pt/Family able to effectively weigh alternatives and participate in decision making related to treatment and care  Description: INTERVENTIONS:  1. Determine when there are differences between patient's view, family's view, and healthcare provider's view of condition  2. Facilitate patient and family articulation of goals for care  3. Help patient and family identify pros/cons of alternative solutions  4. Provide information as requested by

## 2024-05-14 LAB
ALBUMIN SERPL-MCNC: 2.5 G/DL (ref 3.5–5.2)
ALP SERPL-CCNC: 83 U/L (ref 35–104)
ALT SERPL-CCNC: 22 U/L (ref 5–33)
ANION GAP SERPL CALCULATED.3IONS-SCNC: 9 MMOL/L (ref 7–19)
AST SERPL-CCNC: 11 U/L (ref 5–32)
BASOPHILS # BLD: 0 K/UL (ref 0–0.2)
BASOPHILS NFR BLD: 0.2 % (ref 0–1)
BILIRUB SERPL-MCNC: 0.4 MG/DL (ref 0.2–1.2)
BUN SERPL-MCNC: 37 MG/DL (ref 8–23)
CALCIUM SERPL-MCNC: 8.2 MG/DL (ref 8.8–10.2)
CHLORIDE SERPL-SCNC: 93 MMOL/L (ref 98–111)
CO2 SERPL-SCNC: 29 MMOL/L (ref 22–29)
CREAT SERPL-MCNC: 1 MG/DL (ref 0.5–0.9)
EKG P AXIS: NORMAL DEGREES
EKG P-R INTERVAL: NORMAL MS
EKG Q-T INTERVAL: 338 MS
EKG QRS DURATION: 82 MS
EKG QTC CALCULATION (BAZETT): 448 MS
EKG T AXIS: 45 DEGREES
EOSINOPHIL # BLD: 0.5 K/UL (ref 0–0.6)
EOSINOPHIL NFR BLD: 2.7 % (ref 0–5)
ERYTHROCYTE [DISTWIDTH] IN BLOOD BY AUTOMATED COUNT: 14.8 % (ref 11.5–14.5)
GLUCOSE BLD-MCNC: 302 MG/DL (ref 70–99)
GLUCOSE BLD-MCNC: 307 MG/DL (ref 70–99)
GLUCOSE BLD-MCNC: 309 MG/DL (ref 70–99)
GLUCOSE BLD-MCNC: 73 MG/DL (ref 70–99)
GLUCOSE SERPL-MCNC: 132 MG/DL (ref 74–109)
HCT VFR BLD AUTO: 35.1 % (ref 37–47)
HGB BLD-MCNC: 11.2 G/DL (ref 12–16)
IMM GRANULOCYTES # BLD: 0.1 K/UL
LYMPHOCYTES # BLD: 1.5 K/UL (ref 1.1–4.5)
LYMPHOCYTES NFR BLD: 7.9 % (ref 20–40)
MCH RBC QN AUTO: 25.9 PG (ref 27–31)
MCHC RBC AUTO-ENTMCNC: 31.9 G/DL (ref 33–37)
MCV RBC AUTO: 81.1 FL (ref 81–99)
MONOCYTES # BLD: 1.3 K/UL (ref 0–0.9)
MONOCYTES NFR BLD: 6.9 % (ref 0–10)
NEUTROPHILS # BLD: 15.3 K/UL (ref 1.5–7.5)
NEUTS SEG NFR BLD: 81.8 % (ref 50–65)
PERFORMED ON: ABNORMAL
PERFORMED ON: NORMAL
PLATELET # BLD AUTO: 358 K/UL (ref 130–400)
PMV BLD AUTO: 9 FL (ref 9.4–12.3)
POTASSIUM SERPL-SCNC: 4 MMOL/L (ref 3.5–5)
PROT SERPL-MCNC: 5.8 G/DL (ref 6.6–8.7)
RBC # BLD AUTO: 4.33 M/UL (ref 4.2–5.4)
SODIUM SERPL-SCNC: 131 MMOL/L (ref 136–145)
WBC # BLD AUTO: 18.8 K/UL (ref 4.8–10.8)

## 2024-05-14 PROCEDURE — 99231 SBSQ HOSP IP/OBS SF/LOW 25: CPT | Performed by: INTERNAL MEDICINE

## 2024-05-14 PROCEDURE — 80053 COMPREHEN METABOLIC PANEL: CPT

## 2024-05-14 PROCEDURE — 6370000000 HC RX 637 (ALT 250 FOR IP): Performed by: INTERNAL MEDICINE

## 2024-05-14 PROCEDURE — 36415 COLL VENOUS BLD VENIPUNCTURE: CPT

## 2024-05-14 PROCEDURE — 2580000003 HC RX 258: Performed by: INTERNAL MEDICINE

## 2024-05-14 PROCEDURE — 93005 ELECTROCARDIOGRAM TRACING: CPT | Performed by: INTERNAL MEDICINE

## 2024-05-14 PROCEDURE — 94760 N-INVAS EAR/PLS OXIMETRY 1: CPT

## 2024-05-14 PROCEDURE — 94640 AIRWAY INHALATION TREATMENT: CPT

## 2024-05-14 PROCEDURE — 2140000000 HC CCU INTERMEDIATE R&B

## 2024-05-14 PROCEDURE — 97116 GAIT TRAINING THERAPY: CPT

## 2024-05-14 PROCEDURE — 82962 GLUCOSE BLOOD TEST: CPT

## 2024-05-14 PROCEDURE — 2500000003 HC RX 250 WO HCPCS: Performed by: INTERNAL MEDICINE

## 2024-05-14 PROCEDURE — 93005 ELECTROCARDIOGRAM TRACING: CPT | Performed by: HOSPITALIST

## 2024-05-14 PROCEDURE — 6360000002 HC RX W HCPCS: Performed by: INTERNAL MEDICINE

## 2024-05-14 PROCEDURE — 97110 THERAPEUTIC EXERCISES: CPT

## 2024-05-14 PROCEDURE — 85025 COMPLETE CBC W/AUTO DIFF WBC: CPT

## 2024-05-14 PROCEDURE — 2700000000 HC OXYGEN THERAPY PER DAY

## 2024-05-14 PROCEDURE — 93010 ELECTROCARDIOGRAM REPORT: CPT | Performed by: INTERNAL MEDICINE

## 2024-05-14 RX ORDER — INSULIN GLARGINE 100 [IU]/ML
12 INJECTION, SOLUTION SUBCUTANEOUS 2 TIMES DAILY
Status: DISCONTINUED | OUTPATIENT
Start: 2024-05-14 | End: 2024-05-14

## 2024-05-14 RX ORDER — INSULIN GLARGINE 100 [IU]/ML
12 INJECTION, SOLUTION SUBCUTANEOUS 2 TIMES DAILY
Status: DISCONTINUED | OUTPATIENT
Start: 2024-05-14 | End: 2024-05-15

## 2024-05-14 RX ADMIN — Medication 5000 UNITS: at 09:23

## 2024-05-14 RX ADMIN — DILTIAZEM HYDROCHLORIDE 120 MG: 120 CAPSULE, COATED, EXTENDED RELEASE ORAL at 09:22

## 2024-05-14 RX ADMIN — INSULIN LISPRO 4 UNITS: 100 INJECTION, SOLUTION INTRAVENOUS; SUBCUTANEOUS at 20:25

## 2024-05-14 RX ADMIN — MICONAZOLE NITRATE: 2 POWDER TOPICAL at 20:26

## 2024-05-14 RX ADMIN — PSYLLIUM HUSK 1 PACKET: 3.4 POWDER ORAL at 13:56

## 2024-05-14 RX ADMIN — APIXABAN 5 MG: 5 TABLET, FILM COATED ORAL at 09:22

## 2024-05-14 RX ADMIN — INSULIN LISPRO 6 UNITS: 100 INJECTION, SOLUTION INTRAVENOUS; SUBCUTANEOUS at 13:55

## 2024-05-14 RX ADMIN — BUDESONIDE AND FORMOTEROL FUMARATE DIHYDRATE 2 PUFF: 160; 4.5 AEROSOL RESPIRATORY (INHALATION) at 18:35

## 2024-05-14 RX ADMIN — IPRATROPIUM BROMIDE 2 PUFF: 17 AEROSOL, METERED RESPIRATORY (INHALATION) at 18:35

## 2024-05-14 RX ADMIN — LEVALBUTEROL HYDROCHLORIDE 0.63 MG: 0.63 SOLUTION RESPIRATORY (INHALATION) at 06:33

## 2024-05-14 RX ADMIN — METOPROLOL SUCCINATE 100 MG: 50 TABLET, EXTENDED RELEASE ORAL at 09:23

## 2024-05-14 RX ADMIN — IPRATROPIUM BROMIDE 2 PUFF: 17 AEROSOL, METERED RESPIRATORY (INHALATION) at 14:40

## 2024-05-14 RX ADMIN — INSULIN GLARGINE 12 UNITS: 100 INJECTION, SOLUTION SUBCUTANEOUS at 20:25

## 2024-05-14 RX ADMIN — DILTIAZEM HYDROCHLORIDE 5 MG/HR: 5 INJECTION, SOLUTION INTRAVENOUS at 23:13

## 2024-05-14 RX ADMIN — IPRATROPIUM BROMIDE 2 PUFF: 17 AEROSOL, METERED RESPIRATORY (INHALATION) at 06:50

## 2024-05-14 RX ADMIN — INSULIN GLARGINE 12 UNITS: 100 INJECTION, SOLUTION SUBCUTANEOUS at 13:55

## 2024-05-14 RX ADMIN — GUAIFENESIN SYRUP AND DEXTROMETHORPHAN 10 ML: 100; 10 SYRUP ORAL at 03:12

## 2024-05-14 RX ADMIN — ATORVASTATIN CALCIUM 20 MG: 20 TABLET, FILM COATED ORAL at 09:22

## 2024-05-14 RX ADMIN — APIXABAN 5 MG: 5 TABLET, FILM COATED ORAL at 20:25

## 2024-05-14 RX ADMIN — BUDESONIDE AND FORMOTEROL FUMARATE DIHYDRATE 2 PUFF: 160; 4.5 AEROSOL RESPIRATORY (INHALATION) at 06:53

## 2024-05-14 RX ADMIN — INSULIN LISPRO 6 UNITS: 100 INJECTION, SOLUTION INTRAVENOUS; SUBCUTANEOUS at 18:12

## 2024-05-14 RX ADMIN — DONEPEZIL HYDROCHLORIDE 10 MG: 10 TABLET, FILM COATED ORAL at 20:25

## 2024-05-14 RX ADMIN — FUROSEMIDE 40 MG: 40 TABLET ORAL at 09:22

## 2024-05-14 RX ADMIN — PSYLLIUM HUSK 1 PACKET: 3.4 POWDER ORAL at 18:12

## 2024-05-14 RX ADMIN — IPRATROPIUM BROMIDE 2 PUFF: 17 AEROSOL, METERED RESPIRATORY (INHALATION) at 10:29

## 2024-05-14 RX ADMIN — METOPROLOL SUCCINATE 100 MG: 50 TABLET, EXTENDED RELEASE ORAL at 20:25

## 2024-05-14 RX ADMIN — LEVALBUTEROL HYDROCHLORIDE 0.63 MG: 0.63 SOLUTION RESPIRATORY (INHALATION) at 14:39

## 2024-05-14 RX ADMIN — DILTIAZEM HYDROCHLORIDE 120 MG: 120 CAPSULE, COATED, EXTENDED RELEASE ORAL at 20:25

## 2024-05-14 RX ADMIN — MICONAZOLE NITRATE: 2 POWDER TOPICAL at 09:24

## 2024-05-14 RX ADMIN — LEVALBUTEROL HYDROCHLORIDE 0.63 MG: 0.63 SOLUTION RESPIRATORY (INHALATION) at 22:08

## 2024-05-14 ASSESSMENT — PAIN SCALES - GENERAL: PAINLEVEL_OUTOF10: 0

## 2024-05-14 NOTE — PROGRESS NOTES
05/14/24 1219   Subjective   Subjective Patient in bed agrees to TR to chair.  No C/O pain.   Vitals   Temp 98.1 °F (36.7 °C)   Pulse (!) 45   Heart Rate Source Monitor   Respirations 18   SpO2 96 %   O2 Device Nasal cannula   BP 98/67   MAP (Calculated) 77   BP Location Right upper arm   Patient Position Supine   Bed Mobility Training   Supine to Sit Minimum assistance  (Bed on Auto firm.)   Balance   Sitting Intact   Standing With support  (RW)   Transfer Training   Transfer Training Yes   Sit to Stand Minimum assistance   Stand to Sit Contact-guard assistance   Bed to Chair Contact-guard assistance   Gait Training   Gait Training Yes   Gait   Overall Level of Assistance Minimum assistance   Distance (ft) 2 Feet  (To chair)   Assistive Device Walker, rolling   PT Exercises   A/AROM Exercises BL LE EX in sitting X 10 reps.   Patient Education   Education Given To Patient   Education Provided Role of Therapy;Plan of Care;Transfer Training;Fall Prevention Strategies   Education Provided Comments Use of  call light staff assist.   Education Method Demonstration;Verbal   Barriers to Learning None   Education Outcome Demonstrated understanding;Verbalized understanding;Continued education needed   Other Specialty Interventions   Other Treatments/Modalities Patient in chair lunch set up call light needs in reach.   PT Plan of Care   Tuesday X       Electronically signed by Mariano Nash PTA on 5/14/2024 at 12:37 PM

## 2024-05-14 NOTE — PROGRESS NOTES
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     Priority: Low    Arthritis, degenerative 09/26/2011     Priority: Low    Dementia (HCC) 09/26/2011     Priority: Low    Former cigarette smoker 09/26/2011     Priority: Low    Psoriasis 09/26/2011     Priority: Low    Hyponatremia 09/26/2011     Priority: Low    Vitamin D deficiency 09/26/2011     Priority: Low    Atrial fibrillation (HCC) 08/12/2011     Priority: Low     Overview Note:     8/12/2011 DCCV on aminodarone      CAD (coronary artery disease) 08/12/2011     Priority: Low     Overview Note:     8/15/2011  cardiolyte negative for myocardial ischemia, EF  66%         Admit Date:  5/2/2024    Admission Problem List: Present on Admission:   (Resolved) COVID-19      Cardiac Specific Data:  Specialty Problems          Cardiology Problems    Atrial fibrillation (HCC)        CAD (coronary artery disease)        Pulmonary embolus (HCC)        Systemic hypertension         1.  Persistent atrial flutter, atypical, on amiodarone and Coumadin, normal LV systolic function with moderate left atrial enlargement, mild aortic stenosis.  2.  Moderate dementia.  3.  It is requiring diabetes mellitus.  4.  COVID infection.    Subjective:  Ms. Cat reports no chest pain or palpitations..  Rate appears better controlled today.    Objective:   /85   Pulse 88   Temp 97.7 °F (36.5 °C) (Temporal)   Resp

## 2024-05-14 NOTE — PROGRESS NOTES
05/14/24 1500   Subjective   Subjective Patient in chair ready for BTB.   Pain Assessment   Pain Assessment None - Denies Pain   Cognition   Overall Cognitive Status WFL   Orientation   Overall Orientation Status WFL   Vitals   O2 Device Nasal cannula   Bed Mobility Training   Bed Mobility Training Yes   Sit to Supine Minimum assistance;Assist X2   Transfer Training   Sit to Stand Minimum assistance;Assist X2  (From recliner.)   Stand to Sit Minimum assistance;Assist X2   Bed to Chair Minimum assistance;Assist X2   Gait Training   Gait Training Yes   Gait   Overall Level of Assistance Minimum assistance;Assist X2   Distance (ft) 2 Feet  (Chair>bed.)   Assistive Device Walker, rolling   Other Specialty Interventions   Other Treatments/Modalities BTB Stephany España RN & СВЕТЛАНА Barrios present bathing pt. when this therapist left.   PT Plan of Care   Tuesday X       Electronically signed by Mariano Nash PTA on 5/14/2024 at 3:53 PM

## 2024-05-14 NOTE — PROGRESS NOTES
Date:2024  Patient: Edie Cat  : 1945  MRN:[id number]  CODE:                                                                        PCP:Dudley Eduardo    Admit Date: 2024  7:17 PM   LOS: 12 days     Hospital course :  Ms. Wells was admitted for COVID 19 infection, mental status change, and hyponatremia.    She was initially admitted to CCU.  She had minimal symptoms regarding her COVID and this cleared quickly.  Her sodium remained low in spite of maximum medical management.  On , she was dosed with Samsca and had improvement in her sodium.  Later on with low sodium started on urea and the sodium improved along with fluid restriction  On  the sodium improved to 133 and at the time of discharge back to SNF the patient developed shortness of breath and started treatment for COPD so the discharge was held.  With treatment COPD resolved however found to have atelectasis and started on incentive spirometry and out of bed to chair  Treated for hyperkalemia on   Developed A-fib with RVR consulted cardiology for further evaluation, medication adjusted during this admission with Coumadin changed to Eliquis for ease of anticoagulation.   On  developed severe tachycardia found to have A-fib with RVR consulted cardiology and transferred to PCU treated with IV Cardizem and amiodarone and finally RVR well-controlled.   Discussed about associated atelectasis of the lungs due to deconditioning and sedentary habit and encourage mobility out of bed to chair, incentive spirometry.  Suspected constipation with some intermittent nausea and vomiting and treated for the same to ensure bowel movement regularly  EP cardiologist on board discussed the plan for SAMANTHA and cardioversion besides maximizing other medication    Subjective:    she has episode of mild hypoglycemia in the morning and the Lantus dose was held, at the time of my exam the blood sugar is above 300 discussed with RN at bedside and  ordered the reduced dose of Lantus and to continue monitor.  Presently sitting in the recliner actively doing the incentive spirometry, still having tachycardia.  She remarked that she do not like the food that is why not taking adequately      Review of Systems    Reviewed 12 system and found most of them negative except as stated above in subjective note    Objective:      Vital signs in last 24 hours:  Patient Vitals for the past 24 hrs:   BP Temp Temp src Pulse Resp SpO2   05/14/24 1219 98/67 98.1 °F (36.7 °C) Temporal (!) 45 18 96 %   05/14/24 0923 122/88 -- -- 98 -- --   05/14/24 0828 122/88 97.5 °F (36.4 °C) Temporal 98 18 95 %   05/14/24 0633 -- -- -- -- -- 97 %   05/14/24 0458 108/63 96.8 °F (36 °C) -- (!) 106 16 98 %   05/14/24 0114 123/89 97 °F (36.1 °C) Temporal (!) 118 18 99 %   05/13/24 2010 (!) 130/113 96.8 °F (36 °C) -- (!) 127 16 96 %   05/13/24 1619 117/78 97.3 °F (36.3 °C) -- (!) 124 18 91 %         Patient examined with appropriate PPE  Physical Exam:  Vital Signs: BP 98/67   Pulse (!) 45   Temp 98.1 °F (36.7 °C) (Temporal)   Resp 18   Ht 1.575 m (5' 2.01\")   Wt 87.7 kg (193 lb 5 oz)   SpO2 96%   BMI 35.35 kg/m²   General appearance:.Lying comfortably in bed ,   HEENT: Normocephalic , Atraumatic, PERRL, JVP not raised  Chest: On inspection no use of accessory muscles of respiration, on auscultation improved air entry over lower one third, Rales over the bases only, presently maintaining saturation in room air  cardiac: Irregular rhythm with tachycardia, S1, S2 normal. No murmurs, gallops, or rubs auscultated.   Abdomen:soft, non-tender; normal bowel sounds, no masses, no organomegaly.  Urogenital : no diaz, no suprapubic tenderness, no CVA tenderness  Extremities: No clubbing or cyanosis. No peripheral edema. Peripheral pulses palpable.  Neurologic: Alert and Cooperative , cranial nerves grossly intact, DTR equal, Power 5 /5   Psychology: No hallucination, no delusion, appropriate

## 2024-05-15 LAB
ALBUMIN SERPL-MCNC: 2.6 G/DL (ref 3.5–5.2)
ALP SERPL-CCNC: 95 U/L (ref 35–104)
ALT SERPL-CCNC: 19 U/L (ref 5–33)
ANION GAP SERPL CALCULATED.3IONS-SCNC: 19 MMOL/L (ref 7–19)
AST SERPL-CCNC: 15 U/L (ref 5–32)
BASOPHILS # BLD: 0.1 K/UL (ref 0–0.2)
BASOPHILS NFR BLD: 0.3 % (ref 0–1)
BILIRUB SERPL-MCNC: 0.4 MG/DL (ref 0.2–1.2)
BUN SERPL-MCNC: 39 MG/DL (ref 8–23)
CALCIUM SERPL-MCNC: 8.2 MG/DL (ref 8.8–10.2)
CHLORIDE SERPL-SCNC: 95 MMOL/L (ref 98–111)
CO2 SERPL-SCNC: 20 MMOL/L (ref 22–29)
CREAT SERPL-MCNC: 1.2 MG/DL (ref 0.5–0.9)
EKG P AXIS: NORMAL DEGREES
EKG P-R INTERVAL: NORMAL MS
EKG Q-T INTERVAL: 348 MS
EKG QRS DURATION: 80 MS
EKG QTC CALCULATION (BAZETT): 449 MS
EKG T AXIS: 79 DEGREES
EOSINOPHIL # BLD: 1.1 K/UL (ref 0–0.6)
EOSINOPHIL NFR BLD: 4.9 % (ref 0–5)
ERYTHROCYTE [DISTWIDTH] IN BLOOD BY AUTOMATED COUNT: 15.5 % (ref 11.5–14.5)
GLUCOSE BLD-MCNC: 111 MG/DL (ref 70–99)
GLUCOSE BLD-MCNC: 151 MG/DL (ref 70–99)
GLUCOSE BLD-MCNC: 167 MG/DL (ref 70–99)
GLUCOSE BLD-MCNC: 231 MG/DL (ref 70–99)
GLUCOSE BLD-MCNC: 243 MG/DL (ref 70–99)
GLUCOSE BLD-MCNC: 32 MG/DL (ref 70–99)
GLUCOSE BLD-MCNC: 394 MG/DL (ref 70–99)
GLUCOSE BLD-MCNC: 480 MG/DL (ref 70–99)
GLUCOSE BLD-MCNC: 54 MG/DL (ref 70–99)
GLUCOSE BLD-MCNC: 57 MG/DL (ref 70–99)
GLUCOSE BLD-MCNC: 61 MG/DL (ref 70–99)
GLUCOSE SERPL-MCNC: 33 MG/DL (ref 74–109)
HCT VFR BLD AUTO: 40.5 % (ref 37–47)
HGB BLD-MCNC: 12 G/DL (ref 12–16)
IMM GRANULOCYTES # BLD: 0.2 K/UL
LYMPHOCYTES # BLD: 1.5 K/UL (ref 1.1–4.5)
LYMPHOCYTES NFR BLD: 6.8 % (ref 20–40)
MCH RBC QN AUTO: 26.1 PG (ref 27–31)
MCHC RBC AUTO-ENTMCNC: 29.6 G/DL (ref 33–37)
MCV RBC AUTO: 88.2 FL (ref 81–99)
MONOCYTES # BLD: 1.3 K/UL (ref 0–0.9)
MONOCYTES NFR BLD: 6 % (ref 0–10)
NEUTROPHILS # BLD: 17.8 K/UL (ref 1.5–7.5)
NEUTS SEG NFR BLD: 81.3 % (ref 50–65)
PERFORMED ON: ABNORMAL
PLATELET # BLD AUTO: 400 K/UL (ref 130–400)
PMV BLD AUTO: 9.7 FL (ref 9.4–12.3)
POTASSIUM SERPL-SCNC: 4.6 MMOL/L (ref 3.5–5)
PROT SERPL-MCNC: 5.4 G/DL (ref 6.6–8.7)
RBC # BLD AUTO: 4.59 M/UL (ref 4.2–5.4)
SODIUM SERPL-SCNC: 134 MMOL/L (ref 136–145)
WBC # BLD AUTO: 21.9 K/UL (ref 4.8–10.8)

## 2024-05-15 PROCEDURE — 82962 GLUCOSE BLOOD TEST: CPT

## 2024-05-15 PROCEDURE — 93010 ELECTROCARDIOGRAM REPORT: CPT | Performed by: INTERNAL MEDICINE

## 2024-05-15 PROCEDURE — 97530 THERAPEUTIC ACTIVITIES: CPT

## 2024-05-15 PROCEDURE — 2140000000 HC CCU INTERMEDIATE R&B

## 2024-05-15 PROCEDURE — 2580000003 HC RX 258: Performed by: INTERNAL MEDICINE

## 2024-05-15 PROCEDURE — 6370000000 HC RX 637 (ALT 250 FOR IP): Performed by: INTERNAL MEDICINE

## 2024-05-15 PROCEDURE — 85025 COMPLETE CBC W/AUTO DIFF WBC: CPT

## 2024-05-15 PROCEDURE — 94760 N-INVAS EAR/PLS OXIMETRY 1: CPT

## 2024-05-15 PROCEDURE — 80053 COMPREHEN METABOLIC PANEL: CPT

## 2024-05-15 PROCEDURE — 94640 AIRWAY INHALATION TREATMENT: CPT

## 2024-05-15 PROCEDURE — 97535 SELF CARE MNGMENT TRAINING: CPT

## 2024-05-15 PROCEDURE — 99232 SBSQ HOSP IP/OBS MODERATE 35: CPT | Performed by: INTERNAL MEDICINE

## 2024-05-15 PROCEDURE — 2500000003 HC RX 250 WO HCPCS: Performed by: INTERNAL MEDICINE

## 2024-05-15 PROCEDURE — 36415 COLL VENOUS BLD VENIPUNCTURE: CPT

## 2024-05-15 PROCEDURE — 2500000003 HC RX 250 WO HCPCS: Performed by: HOSPITALIST

## 2024-05-15 PROCEDURE — 2700000000 HC OXYGEN THERAPY PER DAY

## 2024-05-15 PROCEDURE — 6360000002 HC RX W HCPCS: Performed by: INTERNAL MEDICINE

## 2024-05-15 RX ORDER — DEXTROSE MONOHYDRATE 25 G/50ML
25 INJECTION, SOLUTION INTRAVENOUS ONCE
Status: COMPLETED | OUTPATIENT
Start: 2024-05-15 | End: 2024-05-15

## 2024-05-15 RX ORDER — INSULIN GLARGINE 100 [IU]/ML
8 INJECTION, SOLUTION SUBCUTANEOUS ONCE
Status: DISCONTINUED | OUTPATIENT
Start: 2024-05-15 | End: 2024-05-15

## 2024-05-15 RX ORDER — INSULIN LISPRO 100 [IU]/ML
0-4 INJECTION, SOLUTION INTRAVENOUS; SUBCUTANEOUS NIGHTLY
Status: DISCONTINUED | OUTPATIENT
Start: 2024-05-15 | End: 2024-05-17

## 2024-05-15 RX ORDER — FAMOTIDINE 20 MG/1
20 TABLET, FILM COATED ORAL 2 TIMES DAILY
Status: DISCONTINUED | OUTPATIENT
Start: 2024-05-15 | End: 2024-05-15 | Stop reason: DRUGHIGH

## 2024-05-15 RX ORDER — DIPHENHYDRAMINE HCL 25 MG
25 TABLET ORAL 2 TIMES DAILY
Status: COMPLETED | OUTPATIENT
Start: 2024-05-15 | End: 2024-05-16

## 2024-05-15 RX ORDER — DILTIAZEM HYDROCHLORIDE 180 MG/1
180 CAPSULE, COATED, EXTENDED RELEASE ORAL 2 TIMES DAILY
Status: DISCONTINUED | OUTPATIENT
Start: 2024-05-15 | End: 2024-05-17

## 2024-05-15 RX ORDER — INSULIN GLARGINE 100 [IU]/ML
20 INJECTION, SOLUTION SUBCUTANEOUS 2 TIMES DAILY
Status: DISCONTINUED | OUTPATIENT
Start: 2024-05-15 | End: 2024-05-15

## 2024-05-15 RX ORDER — INSULIN GLARGINE 100 [IU]/ML
3 INJECTION, SOLUTION SUBCUTANEOUS ONCE
Status: COMPLETED | OUTPATIENT
Start: 2024-05-15 | End: 2024-05-15

## 2024-05-15 RX ORDER — FAMOTIDINE 20 MG/1
20 TABLET, FILM COATED ORAL DAILY
Status: COMPLETED | OUTPATIENT
Start: 2024-05-15 | End: 2024-05-16

## 2024-05-15 RX ORDER — INSULIN LISPRO 100 [IU]/ML
0-4 INJECTION, SOLUTION INTRAVENOUS; SUBCUTANEOUS
Status: DISCONTINUED | OUTPATIENT
Start: 2024-05-15 | End: 2024-05-17

## 2024-05-15 RX ORDER — INSULIN GLARGINE 100 [IU]/ML
15 INJECTION, SOLUTION SUBCUTANEOUS 2 TIMES DAILY
Status: DISCONTINUED | OUTPATIENT
Start: 2024-05-15 | End: 2024-05-18

## 2024-05-15 RX ADMIN — INSULIN LISPRO 4 UNITS: 100 INJECTION, SOLUTION INTRAVENOUS; SUBCUTANEOUS at 23:17

## 2024-05-15 RX ADMIN — DEXTROSE MONOHYDRATE: 100 INJECTION, SOLUTION INTRAVENOUS at 05:32

## 2024-05-15 RX ADMIN — LEVALBUTEROL HYDROCHLORIDE 0.63 MG: 0.63 SOLUTION RESPIRATORY (INHALATION) at 14:29

## 2024-05-15 RX ADMIN — MICONAZOLE NITRATE: 2 POWDER TOPICAL at 09:20

## 2024-05-15 RX ADMIN — DIPHENHYDRAMINE HYDROCHLORIDE AND ZINC ACETATE: 10; 1 CREAM TOPICAL at 23:08

## 2024-05-15 RX ADMIN — Medication 5000 UNITS: at 09:21

## 2024-05-15 RX ADMIN — INSULIN GLARGINE 15 UNITS: 100 INJECTION, SOLUTION SUBCUTANEOUS at 22:00

## 2024-05-15 RX ADMIN — DILTIAZEM HYDROCHLORIDE 180 MG: 180 CAPSULE, COATED, EXTENDED RELEASE ORAL at 23:08

## 2024-05-15 RX ADMIN — FUROSEMIDE 40 MG: 40 TABLET ORAL at 09:21

## 2024-05-15 RX ADMIN — IPRATROPIUM BROMIDE 2 PUFF: 17 AEROSOL, METERED RESPIRATORY (INHALATION) at 18:30

## 2024-05-15 RX ADMIN — INSULIN GLARGINE 3 UNITS: 100 INJECTION, SOLUTION SUBCUTANEOUS at 14:46

## 2024-05-15 RX ADMIN — DONEPEZIL HYDROCHLORIDE 10 MG: 10 TABLET, FILM COATED ORAL at 20:26

## 2024-05-15 RX ADMIN — DIPHENHYDRAMINE HYDROCHLORIDE 25 MG: 25 TABLET ORAL at 22:00

## 2024-05-15 RX ADMIN — IPRATROPIUM BROMIDE 2 PUFF: 17 AEROSOL, METERED RESPIRATORY (INHALATION) at 06:43

## 2024-05-15 RX ADMIN — LEVALBUTEROL HYDROCHLORIDE 0.63 MG: 0.63 SOLUTION RESPIRATORY (INHALATION) at 18:32

## 2024-05-15 RX ADMIN — ATORVASTATIN CALCIUM 20 MG: 20 TABLET, FILM COATED ORAL at 09:21

## 2024-05-15 RX ADMIN — BUDESONIDE AND FORMOTEROL FUMARATE DIHYDRATE 2 PUFF: 160; 4.5 AEROSOL RESPIRATORY (INHALATION) at 18:30

## 2024-05-15 RX ADMIN — INSULIN LISPRO 2 UNITS: 100 INJECTION, SOLUTION INTRAVENOUS; SUBCUTANEOUS at 09:26

## 2024-05-15 RX ADMIN — MICONAZOLE NITRATE: 2 POWDER TOPICAL at 21:00

## 2024-05-15 RX ADMIN — LEVALBUTEROL HYDROCHLORIDE 0.63 MG: 0.63 SOLUTION RESPIRATORY (INHALATION) at 06:42

## 2024-05-15 RX ADMIN — METOPROLOL SUCCINATE 100 MG: 50 TABLET, EXTENDED RELEASE ORAL at 09:25

## 2024-05-15 RX ADMIN — PSYLLIUM HUSK 1 PACKET: 3.4 POWDER ORAL at 09:20

## 2024-05-15 RX ADMIN — DILTIAZEM HYDROCHLORIDE 180 MG: 180 CAPSULE, COATED, EXTENDED RELEASE ORAL at 09:21

## 2024-05-15 RX ADMIN — IPRATROPIUM BROMIDE 2 PUFF: 17 AEROSOL, METERED RESPIRATORY (INHALATION) at 14:29

## 2024-05-15 RX ADMIN — METOPROLOL SUCCINATE 100 MG: 50 TABLET, EXTENDED RELEASE ORAL at 20:25

## 2024-05-15 RX ADMIN — INSULIN GLARGINE 12 UNITS: 100 INJECTION, SOLUTION SUBCUTANEOUS at 09:25

## 2024-05-15 RX ADMIN — FAMOTIDINE 20 MG: 20 TABLET ORAL at 13:27

## 2024-05-15 RX ADMIN — BUDESONIDE AND FORMOTEROL FUMARATE DIHYDRATE 2 PUFF: 160; 4.5 AEROSOL RESPIRATORY (INHALATION) at 06:43

## 2024-05-15 RX ADMIN — Medication 16 G: at 04:35

## 2024-05-15 RX ADMIN — IPRATROPIUM BROMIDE 2 PUFF: 17 AEROSOL, METERED RESPIRATORY (INHALATION) at 10:16

## 2024-05-15 RX ADMIN — DEXTROSE MONOHYDRATE 25 G: 25 INJECTION, SOLUTION INTRAVENOUS at 07:13

## 2024-05-15 RX ADMIN — ACETAMINOPHEN 650 MG: 325 TABLET ORAL at 23:02

## 2024-05-15 RX ADMIN — APIXABAN 5 MG: 5 TABLET, FILM COATED ORAL at 23:05

## 2024-05-15 RX ADMIN — PSYLLIUM HUSK 1 PACKET: 3.4 POWDER ORAL at 11:37

## 2024-05-15 RX ADMIN — DIPHENHYDRAMINE HYDROCHLORIDE 25 MG: 25 TABLET ORAL at 13:27

## 2024-05-15 RX ADMIN — DILTIAZEM HYDROCHLORIDE 10 MG/HR: 5 INJECTION, SOLUTION INTRAVENOUS at 10:00

## 2024-05-15 RX ADMIN — Medication 16 G: at 04:56

## 2024-05-15 RX ADMIN — APIXABAN 5 MG: 5 TABLET, FILM COATED ORAL at 09:21

## 2024-05-15 ASSESSMENT — PAIN DESCRIPTION - LOCATION
LOCATION: ANKLE
LOCATION: LEG;KNEE

## 2024-05-15 ASSESSMENT — PAIN DESCRIPTION - ONSET: ONSET: ON-GOING

## 2024-05-15 ASSESSMENT — PAIN - FUNCTIONAL ASSESSMENT
PAIN_FUNCTIONAL_ASSESSMENT: ACTIVITIES ARE NOT PREVENTED
PAIN_FUNCTIONAL_ASSESSMENT: PREVENTS OR INTERFERES SOME ACTIVE ACTIVITIES AND ADLS

## 2024-05-15 ASSESSMENT — PAIN DESCRIPTION - ORIENTATION: ORIENTATION: LEFT

## 2024-05-15 ASSESSMENT — PAIN DESCRIPTION - FREQUENCY: FREQUENCY: CONTINUOUS

## 2024-05-15 ASSESSMENT — PAIN SCALES - GENERAL
PAINLEVEL_OUTOF10: 6
PAINLEVEL_OUTOF10: 7

## 2024-05-15 ASSESSMENT — PAIN DESCRIPTION - PAIN TYPE: TYPE: CHRONIC PAIN

## 2024-05-15 ASSESSMENT — PAIN DESCRIPTION - DESCRIPTORS
DESCRIPTORS: ACHING
DESCRIPTORS: ACHING;DISCOMFORT;SHOOTING

## 2024-05-15 NOTE — PROGRESS NOTES
Pharmacy Renal Adjustment    Edie Cat is a 78 y.o. female. Pharmacy has renally adjusted medications per protocol.    Recent Labs     05/14/24  0142 05/15/24  0204   BUN 37* 39*       Recent Labs     05/14/24  0142 05/15/24  0204   CREATININE 1.0* 1.2*       Estimated Creatinine Clearance: 39 mL/min (A) (based on SCr of 1.2 mg/dL (H)).    Height:   Ht Readings from Last 1 Encounters:   05/15/24 1.575 m (5' 2.01\")     Weight:  Wt Readings from Last 1 Encounters:   05/10/24 87.7 kg (193 lb 5 oz)       CKD stage: NA         Baseline SCr: 1.0    Plan: Adjust the following medications based on renal function:           Famotidine 20 mg orally twice daily adjusted to Famotidine 20 mg orally daily.    Electronically signed by Emely Yost [facility] on 5/15/2024 at 12:54 PM

## 2024-05-15 NOTE — PROGRESS NOTES
Comprehensive Nutrition Assessment    Type and Reason for Visit:  Reassess    Nutrition Recommendations/Plan:   Continue POC.     Malnutrition Assessment:  Malnutrition Status:  At risk for malnutrition (Comment) (05/15/24 1229)    Context:  Acute Illness     Findings of the 6 clinical characteristics of malnutrition:  Energy Intake:  No significant decrease in energy intake  Weight Loss:  No significant weight loss     Body Fat Loss:  No significant body fat loss     Muscle Mass Loss:  No significant muscle mass loss    Fluid Accumulation:  Mild Extremities   Strength:  Not Performed    Nutrition Assessment:    Documented intake remains adequate: %, 26-50% x1. Pt stated she's been eating okay and the food is fine. No food preferences provided. K+ continues at upper end of normal range, recommend to continue diet restriction. Aware insulin adjusted for hypoglycemia; recommend to continue CHO restriction. Will continue to monitor.    Nutrition Related Findings:    BM 5/14. +1 BLE edema. Na 134, K+ 4.6, alb 2.6, glu , accuchek's .         Current Nutrition Intake & Therapies:    Average Meal Intake: 51-75%, %, 26-50%  Average Supplements Intake: None Ordered  ADULT DIET; Regular; 4 carb choices (60 gm/meal); Low Potassium (Less than 3000 mg/day); 1000 ml    Anthropometric Measures:  Height: 157.5 cm (5' 2.01\")  Ideal Body Weight (IBW): 110 lbs (50 kg)    Current Body Weight: 87.7 kg (193 lb 5.5 oz), 175.8 % IBW.    Current BMI (kg/m2): 35.4  Usual Body Weight: 82.6 kg (182 lb 1.6 oz) (7/3/23)  % Weight Change (Calculated): 6.2  BMI Categories: Obese Class 2 (BMI 35.0 -39.9)    Estimated Daily Nutrient Needs:  Energy Requirements Based On: Kcal/kg  Weight Used for Energy Requirements: Current  Energy (kcal/day): 8192-1852 (15-18 kcal/kg)  Weight Used for Protein Requirements: Ideal  Protein (g/day):  (1.2-2 g/kg)  Method Used for Fluid Requirements: 1 ml/kcal  Fluid (ml/day):

## 2024-05-15 NOTE — PROGRESS NOTES
Date:5/15/2024  Patient: Edie Cat  : 1945  MRN:[id number]  CODE:                                                                        PCP:Dudley Eduardo    Admit Date: 2024  7:17 PM   LOS: 13 days     Hospital course :  Ms. Wells was admitted for COVID 19 infection, mental status change, and hyponatremia.    She was initially admitted to CCU.  She had minimal symptoms regarding her COVID and this cleared quickly.  Her sodium remained low in spite of maximum medical management.  On , she was dosed with Samsca and had improvement in her sodium.  Later on with low sodium started on urea and the sodium improved along with fluid restriction  On  the sodium improved to 133 and at the time of discharge back to SNF the patient developed shortness of breath and started treatment for COPD so the discharge was held.  With treatment COPD resolved however found to have atelectasis and started on incentive spirometry and out of bed to chair  Treated for hyperkalemia on   Developed A-fib with RVR consulted cardiology for further evaluation, medication adjusted during this admission with Coumadin changed to Eliquis for ease of anticoagulation.   On  developed severe tachycardia found to have A-fib with RVR consulted cardiology and transferred to PCU treated with IV Cardizem and amiodarone and finally RVR well-controlled.   Discussed about associated atelectasis of the lungs due to deconditioning and sedentary habit and encourage mobility out of bed to chair, incentive spirometry.  Suspected constipation with some intermittent nausea and vomiting and treated for the same to ensure bowel movement regularly  EP cardiologist on board discussed the plan for SAMANTHA and cardioversion besides maximizing other medication    Subjective:    she has episode of mild hypoglycemia in the morning and the Lantus dose was held, at the time of my exam the blood sugar is above 300 discussed with RN at bedside and  discontinue 6 units with meals, continue Lantus as before and continue on scale coverage  5/13 another episode of hypoglycemia of 69, reduce Lantus 15 units twice daily and continue on scale coverage  Encourage adequate oral intake  5/14 again hypoglycemic blood sugar 73 in the morning reduce the Lantus to 12 units twice daily while encourage increase oral intake  5/15 got 24 units of Lantus yesterday however 16 unit of Humalog as per medium dose scale coverage, episode of hypoglycemia in the morning blood sugar of 53, Changed to Lantus 15 unit twice daily and low-dose scale coverage    Likely constipation due to slow colonic transit  Change Senokot as needed  5/13 started on Metamucil p.o. 3 times daily  Having bowel movement daily    DVT prophylaxis: Apixaban    POA  Full Code   Case d/w the RN taking care of the patient  RN  notes reviewed  Consultant notes reviewed     DISPOSITION: Back to SNF whenever the heart rate is well-controlled and cleared by cardiology  D/C: Skilled Facility  Estimated D/C Date: 5/14      Rasta Madsen MD 5/15/2024 4:20 PM    EMR Dragon/Transcription disclaimer:     This dictation was performed using Dragon software.  Mistake and misspelling may have been created without  realizing them, although attempts have made to review the note for such errors.  Please notify me for clarification.  Thank you    Part of the note is copied forward from previous provider.

## 2024-05-15 NOTE — PLAN OF CARE
Problem: Discharge Planning  Goal: Discharge to home or other facility with appropriate resources  5/15/2024 1111 by Roseann Dixon RN  Outcome: Progressing  5/14/2024 2353 by Patrick Nichols RN  Outcome: Progressing     Problem: Safety - Adult  Goal: Free from fall injury  5/15/2024 1111 by Roseann Dixon RN  Outcome: Progressing  5/14/2024 2353 by Patrick Nichols RN  Outcome: Progressing     Problem: ABCDS Injury Assessment  Goal: Absence of physical injury  5/15/2024 1111 by Roseann Dixon RN  Outcome: Progressing  5/14/2024 2353 by Patrick Nichols RN  Outcome: Progressing     Problem: Skin/Tissue Integrity  Goal: Absence of new skin breakdown  Description:  Monitor for areas of redness and/or skin breakdown    5/15/2024 1111 by Roseann Dixon RN  Outcome: Progressing  5/14/2024 2353 by Patrick Nichols RN  Outcome: Progressing     Problem: Pain  Goal: Verbalizes/displays adequate comfort level or baseline comfort level  5/15/2024 1111 by Roseann Dixon RN  Outcome: Progressing  5/14/2024 2353 by Patrick Nichols RN  Outcome: Progressing     Problem: Chronic Conditions and Co-morbidities  Goal: Patient's chronic conditions and co-morbidity symptoms are monitored and maintained or improved  5/15/2024 1111 by Roseann Dixon RN  Outcome: Progressing  5/14/2024 2353 by Patrick Nichols RN  Outcome: Progressing     Problem: Neurosensory - Adult  Goal: Achieves stable or improved neurological status  5/15/2024 1111 by Roseann Dixon RN  Outcome: Progressing  5/14/2024 2353 by Patrick Nichols RN  Outcome: Progressing     Problem: Respiratory - Adult  Goal: Achieves optimal ventilation and oxygenation  5/15/2024 1111 by Roseann Dixon RN  Outcome: Progressing  5/14/2024 2353 by Patrick Nichols RN  Outcome: Progressing     Problem: Cardiovascular - Adult  Goal: Maintains optimal cardiac output and hemodynamic stability  5/15/2024 1111 by Roseann Dixon RN  Outcome:

## 2024-05-15 NOTE — PROGRESS NOTES
Cardizem drip stopped d/t pt HR running in the 60's. After drip turned off pt stated to drop down to the 40's and 50's. Dr. Perkins notified, ordered to continue to monitor. Electronically signed by Roseann Dixon RN on 5/15/2024 at 4:18 PM

## 2024-05-15 NOTE — PROGRESS NOTES
Occupational Therapy     05/15/24 1200   Subjective   Subjective Pt in bed upon arrival for therapy. Pt agreeable to participate.   Pain Assessment   Pain Assessment 0-10   Pain Level 6   Pain Location Leg;Knee   Pain Orientation Left   Pain Descriptors Aching;Discomfort;Shooting   Functional Pain Assessment Prevents or interferes some active activities and ADLs   Pain Type Chronic pain   Pain Frequency Continuous   Pain Onset On-going   Non-Pharmaceutical Pain Intervention(s) Ambulation/Increased Activity;Repositioned;Rest   Response to Pain Intervention Patient satisfied   Cognition   Overall Cognitive Status WFL   Orientation   Overall Orientation Status WFL   Bed Mobility Training   Bed Mobility Training Yes   Overall Level of Assistance Minimum assistance;Moderate assistance   Interventions Verbal cues;Tactile cues;Manual cues   Supine to Sit Minimum assistance;Moderate assistance   Scooting Minimum assistance;Moderate assistance   Transfer Training   Transfer Training Yes   Overall Level of Assistance Contact-guard assistance  (Bridgette Stedy)   Interventions Verbal cues;Tactile cues   Sit to Stand Contact-guard assistance   Stand to Sit Contact-guard assistance   Bed to Chair Contact-guard assistance  (Bridgette Stedy)   Balance   Sitting Intact   Standing With support   ADL   Feeding Independent   Grooming Independent;Setup   UE Bathing Minimal assistance   LE Bathing Maximum assistance   UE Dressing Minimal assistance   LE Dressing Maximum assistance   Toileting Maximum assistance;Dependent/Total   Toileting Skilled Clinical Factors Incontinent   Functional Mobility Minimal assistance;Contact guard assistance   Functional Mobility Skilled Clinical Factors Bridgette Stedy   Assessment   Assessment Tx focused on sitting EOB to don new gown. Pt instructed on STS mobility and t/f to recliner with use of Bridgette stedy. Pt able to  Bridgette Stedy with CGA this date. Blankets used for platforms under BLEs so that patient can  sit upright in recliner with BLE support. All needs in reach.   Activity Tolerance Patient tolerated treatment well;Patient limited by endurance   Discharge Recommendations Patient would benefit from continued therapy after discharge;24 hour supervision or assist   Occupational Therapy Plan   Times Per Week 3-5   Times Per Day Once a day   OT Plan of Care   Wednesday X     Electronically signed by SLAVA Junior on 5/15/2024 at 12:40 PM

## 2024-05-15 NOTE — PROGRESS NOTES
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     Priority: Low    Arthritis, degenerative 09/26/2011     Priority: Low    Dementia (HCC) 09/26/2011     Priority: Low    Former cigarette smoker 09/26/2011     Priority: Low    Psoriasis 09/26/2011     Priority: Low    Hyponatremia 09/26/2011     Priority: Low    Vitamin D deficiency 09/26/2011     Priority: Low    Atrial fibrillation (HCC) 08/12/2011     Priority: Low     Overview Note:     8/12/2011 DCCV on aminodarone      CAD (coronary artery disease) 08/12/2011     Priority: Low     Overview Note:     8/15/2011  cardiolyte negative for myocardial ischemia, EF  66%         Admit Date:  5/2/2024    Admission Problem List: Present on Admission:   (Resolved) COVID-19      Cardiac Specific Data:  Specialty Problems          Cardiology Problems    Atrial fibrillation (HCC)        CAD (coronary artery disease)        Pulmonary embolus (HCC)        Systemic hypertension         1.  Persistent atrial flutter, atypical, on amiodarone and Coumadin, normal LV systolic function with moderate left atrial enlargement, mild aortic stenosis.  2.  Moderate dementia.  3.  It is requiring diabetes mellitus.  4.  COVID infection.    Subjective:  Ms. Cat reports no significant issues.  Has not been out of bed.  No palpitations, shortness of breath reported.  Was started on IV Cardizem overnight for rate control.   JVD  No pitting edema    Pulmonary:      Effort: Pulmonary effort is normal. No respiratory distress.      Breath sounds: Normal breath sounds. No wheezing or rales.   Chest:      Chest wall: No tenderness.   Abdominal:      General: Bowel sounds are normal. There is no distension.      Palpations: Abdomen is soft. There is no mass.      Tenderness: There is no abdominal tenderness. There is no guarding or rebound.      Hernia: No hernia is present.      Comments: No palpable organomegaly   Musculoskeletal:         General: Normal range of motion.   Skin:     General: Skin is warm.      Coloration: Skin is not pale.      Findings: No rash.   Neurological:      Mental Status: She is alert and oriented to person, place, and time.      Cranial Nerves: No cranial nerve deficit.      Deep Tendon Reflexes: Reflexes normal.                 Lab Data:  CBC:   Recent Labs     05/13/24  0137 05/14/24  0142 05/15/24  0203   WBC 16.7* 18.8* 21.9*   HGB 11.3* 11.2* 12.0   HCT 36.1* 35.1* 40.5   MCV 80.6* 81.1 88.2   * 358 400     BMP:   Recent Labs     05/13/24  0137 05/14/24  0142 05/15/24  0204    131* 134*   K 4.0 4.0 4.6   CL 96* 93* 95*   CO2 30* 29 20*   BUN 48* 37* 39*   CREATININE 1.0* 1.0* 1.2*     LIVER PROFILE:   Recent Labs     05/13/24  0137 05/14/24  0142 05/15/24  0204   AST 10 11 15   ALT 22 22 19   BILITOT 0.5 0.4 0.4   ALKPHOS 77 83 95     PT/INR: No results for input(s): \"PROTIME\", \"INR\" in the last 72 hours.  APTT: No results for input(s): \"APTT\" in the last 72 hours.  BNP:  No results for input(s): \"BNP\" in the last 72 hours.  CK, CKMB, Troponin: @LABRCNT (CKTOTAL:3, CKMB:3, TROPONINI:3)@    IMAGING:  Echo (TTE) complete (PRN contrast/bubble/strain/3D)    Result Date: 5/9/2024    Left Ventricle: Low normal left ventricular systolic function with a visually estimated EF of 50 - 55%. Left ventricle size is normal. Findings consistent with mild concentric hypertrophy. Normal wall motion.   Aortic

## 2024-05-15 NOTE — PROGRESS NOTES
Critical lab called BS 31 POCT 54 Pt given oral glucose tablets repeat 61 treatment repeated POCT 57, continues D10W start at 100ml/hr per protocal MD notified PT alert and oriented no complaints at this time, WCTM, recheck at 0640 BS 32 Dr. Romero notified x1 dose D50 given pt remains alert and oriented report given to oncoming nurse.

## 2024-05-16 ENCOUNTER — APPOINTMENT (OUTPATIENT)
Dept: ULTRASOUND IMAGING | Age: 79
End: 2024-05-16
Attending: INTERNAL MEDICINE
Payer: MEDICARE

## 2024-05-16 LAB
ALBUMIN SERPL-MCNC: 2.8 G/DL (ref 3.5–5.2)
ALP SERPL-CCNC: 104 U/L (ref 35–104)
ALT SERPL-CCNC: 19 U/L (ref 5–33)
ANION GAP SERPL CALCULATED.3IONS-SCNC: 12 MMOL/L (ref 7–19)
AST SERPL-CCNC: 8 U/L (ref 5–32)
BASOPHILS # BLD: 0 K/UL (ref 0–0.2)
BASOPHILS NFR BLD: 0.2 % (ref 0–1)
BILIRUB SERPL-MCNC: 0.3 MG/DL (ref 0.2–1.2)
BUN SERPL-MCNC: 51 MG/DL (ref 8–23)
CALCIUM SERPL-MCNC: 8.2 MG/DL (ref 8.8–10.2)
CHLORIDE SERPL-SCNC: 90 MMOL/L (ref 98–111)
CO2 SERPL-SCNC: 27 MMOL/L (ref 22–29)
CREAT SERPL-MCNC: 1.6 MG/DL (ref 0.5–0.9)
EOSINOPHIL # BLD: 0.9 K/UL (ref 0–0.6)
EOSINOPHIL NFR BLD: 4.5 % (ref 0–5)
ERYTHROCYTE [DISTWIDTH] IN BLOOD BY AUTOMATED COUNT: 14.9 % (ref 11.5–14.5)
GLUCOSE BLD-MCNC: 124 MG/DL (ref 70–99)
GLUCOSE BLD-MCNC: 162 MG/DL (ref 70–99)
GLUCOSE BLD-MCNC: 334 MG/DL (ref 70–99)
GLUCOSE BLD-MCNC: 336 MG/DL (ref 70–99)
GLUCOSE SERPL-MCNC: 335 MG/DL (ref 74–109)
HCT VFR BLD AUTO: 37 % (ref 37–47)
HGB BLD-MCNC: 11.6 G/DL (ref 12–16)
IMM GRANULOCYTES # BLD: 0.3 K/UL
LYMPHOCYTES # BLD: 1.2 K/UL (ref 1.1–4.5)
LYMPHOCYTES NFR BLD: 5.6 % (ref 20–40)
MCH RBC QN AUTO: 24.7 PG (ref 27–31)
MCHC RBC AUTO-ENTMCNC: 31.4 G/DL (ref 33–37)
MCV RBC AUTO: 78.9 FL (ref 81–99)
MONOCYTES # BLD: 1.1 K/UL (ref 0–0.9)
MONOCYTES NFR BLD: 5.1 % (ref 0–10)
NEUTROPHILS # BLD: 17.5 K/UL (ref 1.5–7.5)
NEUTS SEG NFR BLD: 83.1 % (ref 50–65)
OSMOLALITY SERPL CALC.SUM OF ELEC: 295 MOSM/KG (ref 275–300)
PERFORMED ON: ABNORMAL
PLATELET # BLD AUTO: 382 K/UL (ref 130–400)
PMV BLD AUTO: 9.2 FL (ref 9.4–12.3)
POTASSIUM SERPL-SCNC: 4.6 MMOL/L (ref 3.5–5)
PROT SERPL-MCNC: 5.5 G/DL (ref 6.6–8.7)
RBC # BLD AUTO: 4.69 M/UL (ref 4.2–5.4)
SODIUM SERPL-SCNC: 129 MMOL/L (ref 136–145)
URATE SERPL-MCNC: 5.5 MG/DL (ref 2.4–5.7)
WBC # BLD AUTO: 21.1 K/UL (ref 4.8–10.8)

## 2024-05-16 PROCEDURE — 36415 COLL VENOUS BLD VENIPUNCTURE: CPT

## 2024-05-16 PROCEDURE — 6370000000 HC RX 637 (ALT 250 FOR IP): Performed by: INTERNAL MEDICINE

## 2024-05-16 PROCEDURE — 85025 COMPLETE CBC W/AUTO DIFF WBC: CPT

## 2024-05-16 PROCEDURE — 84550 ASSAY OF BLOOD/URIC ACID: CPT

## 2024-05-16 PROCEDURE — 6360000002 HC RX W HCPCS

## 2024-05-16 PROCEDURE — 83930 ASSAY OF BLOOD OSMOLALITY: CPT

## 2024-05-16 PROCEDURE — 2140000000 HC CCU INTERMEDIATE R&B

## 2024-05-16 PROCEDURE — 80053 COMPREHEN METABOLIC PANEL: CPT

## 2024-05-16 PROCEDURE — 94760 N-INVAS EAR/PLS OXIMETRY 1: CPT

## 2024-05-16 PROCEDURE — 2700000000 HC OXYGEN THERAPY PER DAY

## 2024-05-16 PROCEDURE — 82962 GLUCOSE BLOOD TEST: CPT

## 2024-05-16 PROCEDURE — 94640 AIRWAY INHALATION TREATMENT: CPT

## 2024-05-16 PROCEDURE — 6360000002 HC RX W HCPCS: Performed by: INTERNAL MEDICINE

## 2024-05-16 PROCEDURE — 76770 US EXAM ABDO BACK WALL COMP: CPT

## 2024-05-16 PROCEDURE — 99222 1ST HOSP IP/OBS MODERATE 55: CPT | Performed by: INTERNAL MEDICINE

## 2024-05-16 RX ORDER — ATROPINE SULFATE 0.1 MG/ML
INJECTION INTRAVENOUS
Status: COMPLETED
Start: 2024-05-16 | End: 2024-05-16

## 2024-05-16 RX ORDER — ATROPINE SULFATE 0.4 MG/ML
0.5 INJECTION, SOLUTION INTRAVENOUS
Status: DISCONTINUED | OUTPATIENT
Start: 2024-05-16 | End: 2024-05-16

## 2024-05-16 RX ORDER — DOBUTAMINE HYDROCHLORIDE 200 MG/100ML
5 INJECTION INTRAVENOUS CONTINUOUS
Status: DISCONTINUED | OUTPATIENT
Start: 2024-05-16 | End: 2024-05-16

## 2024-05-16 RX ORDER — HYDROXYZINE HYDROCHLORIDE 10 MG/1
10 TABLET, FILM COATED ORAL ONCE
Status: COMPLETED | OUTPATIENT
Start: 2024-05-16 | End: 2024-05-16

## 2024-05-16 RX ORDER — ATROPINE SULFATE 0.4 MG/ML
0.5 INJECTION, SOLUTION ENDOTRACHEAL; INTRAMEDULLARY; INTRAMUSCULAR; INTRAVENOUS; SUBCUTANEOUS
Status: DISCONTINUED | OUTPATIENT
Start: 2024-05-16 | End: 2024-05-16

## 2024-05-16 RX ORDER — ATROPINE SULFATE 0.1 MG/ML
0.5 INJECTION INTRAVENOUS ONCE
Status: COMPLETED | OUTPATIENT
Start: 2024-05-16 | End: 2024-05-16

## 2024-05-16 RX ORDER — PREDNISONE 10 MG/1
40 TABLET ORAL DAILY
Status: COMPLETED | OUTPATIENT
Start: 2024-05-16 | End: 2024-05-18

## 2024-05-16 RX ORDER — METOPROLOL SUCCINATE 50 MG/1
50 TABLET, EXTENDED RELEASE ORAL DAILY
Status: DISCONTINUED | OUTPATIENT
Start: 2024-05-16 | End: 2024-05-23

## 2024-05-16 RX ORDER — DOBUTAMINE HYDROCHLORIDE 200 MG/100ML
2.5 INJECTION INTRAVENOUS CONTINUOUS PRN
Status: DISCONTINUED | OUTPATIENT
Start: 2024-05-16 | End: 2024-05-31 | Stop reason: HOSPADM

## 2024-05-16 RX ADMIN — INSULIN LISPRO 4 UNITS: 100 INJECTION, SOLUTION INTRAVENOUS; SUBCUTANEOUS at 17:00

## 2024-05-16 RX ADMIN — METOPROLOL SUCCINATE 100 MG: 50 TABLET, EXTENDED RELEASE ORAL at 08:08

## 2024-05-16 RX ADMIN — PSYLLIUM HUSK 1 PACKET: 3.4 POWDER ORAL at 12:14

## 2024-05-16 RX ADMIN — PSYLLIUM HUSK 1 PACKET: 3.4 POWDER ORAL at 22:00

## 2024-05-16 RX ADMIN — IPRATROPIUM BROMIDE 2 PUFF: 17 AEROSOL, METERED RESPIRATORY (INHALATION) at 10:10

## 2024-05-16 RX ADMIN — DILTIAZEM HYDROCHLORIDE 180 MG: 180 CAPSULE, COATED, EXTENDED RELEASE ORAL at 08:09

## 2024-05-16 RX ADMIN — DIPHENHYDRAMINE HYDROCHLORIDE AND ZINC ACETATE: 10; 1 CREAM TOPICAL at 09:41

## 2024-05-16 RX ADMIN — IPRATROPIUM BROMIDE 2 PUFF: 17 AEROSOL, METERED RESPIRATORY (INHALATION) at 19:08

## 2024-05-16 RX ADMIN — LEVALBUTEROL HYDROCHLORIDE 0.63 MG: 0.63 SOLUTION RESPIRATORY (INHALATION) at 22:24

## 2024-05-16 RX ADMIN — DIPHENHYDRAMINE HYDROCHLORIDE 25 MG: 25 TABLET ORAL at 08:09

## 2024-05-16 RX ADMIN — DIPHENHYDRAMINE HYDROCHLORIDE AND ZINC ACETATE: 10; 1 CREAM TOPICAL at 22:44

## 2024-05-16 RX ADMIN — MICONAZOLE NITRATE: 2 POWDER TOPICAL at 22:42

## 2024-05-16 RX ADMIN — PSYLLIUM HUSK 1 PACKET: 3.4 POWDER ORAL at 09:41

## 2024-05-16 RX ADMIN — ATORVASTATIN CALCIUM 20 MG: 20 TABLET, FILM COATED ORAL at 08:08

## 2024-05-16 RX ADMIN — BUDESONIDE AND FORMOTEROL FUMARATE DIHYDRATE 2 PUFF: 160; 4.5 AEROSOL RESPIRATORY (INHALATION) at 19:08

## 2024-05-16 RX ADMIN — DIPHENHYDRAMINE HYDROCHLORIDE AND ZINC ACETATE: 10; 1 CREAM TOPICAL at 14:46

## 2024-05-16 RX ADMIN — INSULIN GLARGINE 15 UNITS: 100 INJECTION, SOLUTION SUBCUTANEOUS at 08:09

## 2024-05-16 RX ADMIN — APIXABAN 5 MG: 5 TABLET, FILM COATED ORAL at 08:08

## 2024-05-16 RX ADMIN — ATROPINE SULFATE 0.5 MG: 0.1 INJECTION INTRAVENOUS at 18:55

## 2024-05-16 RX ADMIN — INSULIN GLARGINE 15 UNITS: 100 INJECTION, SOLUTION SUBCUTANEOUS at 22:00

## 2024-05-16 RX ADMIN — HYDROXYZINE HYDROCHLORIDE 10 MG: 10 TABLET ORAL at 09:40

## 2024-05-16 RX ADMIN — DONEPEZIL HYDROCHLORIDE 10 MG: 10 TABLET, FILM COATED ORAL at 22:42

## 2024-05-16 RX ADMIN — DIPHENHYDRAMINE HYDROCHLORIDE 25 MG: 25 TABLET ORAL at 22:48

## 2024-05-16 RX ADMIN — LEVALBUTEROL HYDROCHLORIDE 0.63 MG: 0.63 SOLUTION RESPIRATORY (INHALATION) at 15:13

## 2024-05-16 RX ADMIN — FUROSEMIDE 40 MG: 40 TABLET ORAL at 08:08

## 2024-05-16 RX ADMIN — IPRATROPIUM BROMIDE 2 PUFF: 17 AEROSOL, METERED RESPIRATORY (INHALATION) at 15:14

## 2024-05-16 RX ADMIN — APIXABAN 5 MG: 5 TABLET, FILM COATED ORAL at 22:00

## 2024-05-16 RX ADMIN — Medication 5000 UNITS: at 08:08

## 2024-05-16 RX ADMIN — BUDESONIDE AND FORMOTEROL FUMARATE DIHYDRATE 2 PUFF: 160; 4.5 AEROSOL RESPIRATORY (INHALATION) at 06:48

## 2024-05-16 RX ADMIN — IPRATROPIUM BROMIDE 2 PUFF: 17 AEROSOL, METERED RESPIRATORY (INHALATION) at 06:48

## 2024-05-16 RX ADMIN — FAMOTIDINE 20 MG: 20 TABLET ORAL at 14:49

## 2024-05-16 RX ADMIN — PREDNISONE 40 MG: 20 TABLET ORAL at 10:36

## 2024-05-16 RX ADMIN — LEVALBUTEROL HYDROCHLORIDE 0.63 MG: 0.63 SOLUTION RESPIRATORY (INHALATION) at 06:44

## 2024-05-16 NOTE — PROGRESS NOTES
Occupational Therapy  Pt drowsy and not appropriate for therapy at this time. Will continue to follow. Electronically signed by SLAVA Junior on 5/16/2024 at 12:45 PM

## 2024-05-16 NOTE — PROGRESS NOTES
Date:2024  Patient: Edie Cat  : 1945  MRN:[id number]  CODE:                                                                        PCP:Dudley Eduardo    Admit Date: 2024  7:17 PM   LOS: 14 days     Hospital course :  Ms. Wells was admitted for COVID 19 infection, mental status change, and hyponatremia.    She was initially admitted to CCU.  She had minimal symptoms regarding her COVID and this cleared quickly.  Her sodium remained low in spite of maximum medical management.  On , she was dosed with Samsca and had improvement in her sodium.  Later on with low sodium started on urea and the sodium improved along with fluid restriction  On  the sodium improved to 133 and at the time of discharge back to SNF the patient developed shortness of breath and started treatment for COPD so the discharge was held.      With treatment COPD resolved however found to have atelectasis and started on incentive spirometry and out of bed to chair  Treated for hyperkalemia on   Developed A-fib with RVR consulted cardiology for further evaluation, medication adjusted during this admission with Coumadin changed to Eliquis for ease of anticoagulation.   On  developed severe tachycardia found to have A-fib with RVR consulted cardiology and transferred to PCU treated with IV Cardizem and amiodarone and finally RVR is better controlled will follow-up with cardiology  Discussed about associated atelectasis of the lungs due to deconditioning and sedentary habit and encourage mobility out of bed to chair, incentive spirometry, usually confined to bed in the SNF.  Suspected constipation with some intermittent nausea and vomiting and treated for the same to ensure bowel movement regularly  EP cardiologist on board discussed the plan for SAMANTHA and cardioversion besides maximizing other medication  She is having episodes of hypoglycemia due to poor oral intake insulin dose has been adjusted  Developed drug rash

## 2024-05-16 NOTE — PROGRESS NOTES
Patient has \"severe\" rash covering face, neck, back, chest, carmencita underarms, and carmencita groins.  Patient calling out multiple times complaining of itching gave benadryl po and benadryl cream.

## 2024-05-16 NOTE — PLAN OF CARE
Problem: Discharge Planning  Goal: Discharge to home or other facility with appropriate resources  Outcome: Progressing     Problem: Safety - Adult  Goal: Free from fall injury  Outcome: Progressing     Problem: ABCDS Injury Assessment  Goal: Absence of physical injury  Outcome: Progressing     Problem: Skin/Tissue Integrity  Goal: Absence of new skin breakdown  Description:  Monitor for areas of redness and/or skin breakdown    Outcome: Progressing     Problem: Pain  Goal: Verbalizes/displays adequate comfort level or baseline comfort level  Outcome: Progressing     Problem: Chronic Conditions and Co-morbidities  Goal: Patient's chronic conditions and co-morbidity symptoms are monitored and maintained or improved  Outcome: Progressing     Problem: Neurosensory - Adult  Goal: Achieves stable or improved neurological status  Outcome: Progressing     Problem: Respiratory - Adult  Goal: Achieves optimal ventilation and oxygenation  Outcome: Progressing     Problem: Cardiovascular - Adult  Goal: Maintains optimal cardiac output and hemodynamic stability  Outcome: Progressing     Problem: Skin/Tissue Integrity - Adult  Goal: Skin integrity remains intact  Outcome: Progressing     Problem: Musculoskeletal - Adult  Goal: Return mobility to safest level of function  Outcome: Progressing     Problem: Gastrointestinal - Adult  Goal: Minimal or absence of nausea and vomiting  Outcome: Progressing     Problem: Genitourinary - Adult  Goal: Absence of urinary retention  Outcome: Progressing     Problem: Infection - Adult  Goal: Absence of infection at discharge  Outcome: Progressing     Problem: Metabolic/Fluid and Electrolytes - Adult  Goal: Electrolytes maintained within normal limits  Outcome: Progressing     Problem: Anxiety  Goal: Will report anxiety at manageable levels  Description: INTERVENTIONS:  1. Administer medication as ordered  2. Teach and rehearse alternative coping skills  3. Provide emotional support with 1:1

## 2024-05-17 LAB
ANION GAP SERPL CALCULATED.3IONS-SCNC: 13 MMOL/L (ref 7–19)
ANION GAP SERPL CALCULATED.3IONS-SCNC: 16 MMOL/L (ref 7–19)
BASOPHILS # BLD: 0.1 K/UL (ref 0–0.2)
BASOPHILS NFR BLD: 0.3 % (ref 0–1)
BUN SERPL-MCNC: 63 MG/DL (ref 8–23)
BUN SERPL-MCNC: 68 MG/DL (ref 8–23)
CALCIUM SERPL-MCNC: 8.1 MG/DL (ref 8.8–10.2)
CALCIUM SERPL-MCNC: 8.5 MG/DL (ref 8.8–10.2)
CHLORIDE SERPL-SCNC: 87 MMOL/L (ref 98–111)
CHLORIDE SERPL-SCNC: 88 MMOL/L (ref 98–111)
CO2 SERPL-SCNC: 22 MMOL/L (ref 22–29)
CO2 SERPL-SCNC: 22 MMOL/L (ref 22–29)
CREAT SERPL-MCNC: 1.8 MG/DL (ref 0.5–0.9)
CREAT SERPL-MCNC: 2.4 MG/DL (ref 0.5–0.9)
EOSINOPHIL # BLD: 0.1 K/UL (ref 0–0.6)
EOSINOPHIL NFR BLD: 0.3 % (ref 0–5)
ERYTHROCYTE [DISTWIDTH] IN BLOOD BY AUTOMATED COUNT: 15.1 % (ref 11.5–14.5)
GLUCOSE BLD-MCNC: 258 MG/DL (ref 70–99)
GLUCOSE BLD-MCNC: 347 MG/DL (ref 70–99)
GLUCOSE BLD-MCNC: 371 MG/DL (ref 70–99)
GLUCOSE BLD-MCNC: 410 MG/DL (ref 70–99)
GLUCOSE BLD-MCNC: 459 MG/DL (ref 70–99)
GLUCOSE SERPL-MCNC: 337 MG/DL (ref 74–109)
GLUCOSE SERPL-MCNC: 391 MG/DL (ref 74–109)
HCT VFR BLD AUTO: 33.9 % (ref 37–47)
HGB BLD-MCNC: 10.3 G/DL (ref 12–16)
IMM GRANULOCYTES # BLD: 0.1 K/UL
LYMPHOCYTES # BLD: 0.8 K/UL (ref 1.1–4.5)
LYMPHOCYTES NFR BLD: 4.6 % (ref 20–40)
MCH RBC QN AUTO: 25.2 PG (ref 27–31)
MCHC RBC AUTO-ENTMCNC: 30.4 G/DL (ref 33–37)
MCV RBC AUTO: 82.9 FL (ref 81–99)
MONOCYTES # BLD: 0.7 K/UL (ref 0–0.9)
MONOCYTES NFR BLD: 3.9 % (ref 0–10)
NEUTROPHILS # BLD: 16.2 K/UL (ref 1.5–7.5)
NEUTS SEG NFR BLD: 90.1 % (ref 50–65)
OSMOLALITY URINE: 422 MOSM/KG (ref 250–1200)
PERFORMED ON: ABNORMAL
PLATELET # BLD AUTO: 305 K/UL (ref 130–400)
PMV BLD AUTO: 9.3 FL (ref 9.4–12.3)
POTASSIUM SERPL-SCNC: 4.9 MMOL/L (ref 3.5–5)
POTASSIUM SERPL-SCNC: 5.5 MMOL/L (ref 3.5–5)
RBC # BLD AUTO: 4.09 M/UL (ref 4.2–5.4)
SODIUM SERPL-SCNC: 122 MMOL/L (ref 136–145)
SODIUM SERPL-SCNC: 126 MMOL/L (ref 136–145)
SODIUM UR-SCNC: 33 MMOL/L
UUN UR-MCNC: 485 MG/DL
WBC # BLD AUTO: 18 K/UL (ref 4.8–10.8)

## 2024-05-17 PROCEDURE — 6370000000 HC RX 637 (ALT 250 FOR IP): Performed by: INTERNAL MEDICINE

## 2024-05-17 PROCEDURE — 84300 ASSAY OF URINE SODIUM: CPT

## 2024-05-17 PROCEDURE — 2140000000 HC CCU INTERMEDIATE R&B

## 2024-05-17 PROCEDURE — 82962 GLUCOSE BLOOD TEST: CPT

## 2024-05-17 PROCEDURE — 84540 ASSAY OF URINE/UREA-N: CPT

## 2024-05-17 PROCEDURE — 80048 BASIC METABOLIC PNL TOTAL CA: CPT

## 2024-05-17 PROCEDURE — 6370000000 HC RX 637 (ALT 250 FOR IP): Performed by: STUDENT IN AN ORGANIZED HEALTH CARE EDUCATION/TRAINING PROGRAM

## 2024-05-17 PROCEDURE — 2700000000 HC OXYGEN THERAPY PER DAY

## 2024-05-17 PROCEDURE — 36415 COLL VENOUS BLD VENIPUNCTURE: CPT

## 2024-05-17 PROCEDURE — 85025 COMPLETE CBC W/AUTO DIFF WBC: CPT

## 2024-05-17 PROCEDURE — 83935 ASSAY OF URINE OSMOLALITY: CPT

## 2024-05-17 PROCEDURE — 94640 AIRWAY INHALATION TREATMENT: CPT

## 2024-05-17 PROCEDURE — 94760 N-INVAS EAR/PLS OXIMETRY 1: CPT

## 2024-05-17 PROCEDURE — 99232 SBSQ HOSP IP/OBS MODERATE 35: CPT | Performed by: INTERNAL MEDICINE

## 2024-05-17 PROCEDURE — 6360000002 HC RX W HCPCS: Performed by: INTERNAL MEDICINE

## 2024-05-17 PROCEDURE — 2580000003 HC RX 258: Performed by: STUDENT IN AN ORGANIZED HEALTH CARE EDUCATION/TRAINING PROGRAM

## 2024-05-17 PROCEDURE — 51702 INSERT TEMP BLADDER CATH: CPT

## 2024-05-17 RX ORDER — DIPHENHYDRAMINE HCL 25 MG
25 TABLET ORAL EVERY 6 HOURS PRN
Status: DISCONTINUED | OUTPATIENT
Start: 2024-05-17 | End: 2024-05-31 | Stop reason: HOSPADM

## 2024-05-17 RX ORDER — SODIUM CHLORIDE 9 MG/ML
INJECTION, SOLUTION INTRAVENOUS CONTINUOUS
Status: DISCONTINUED | OUTPATIENT
Start: 2024-05-17 | End: 2024-05-18

## 2024-05-17 RX ORDER — INSULIN LISPRO 100 [IU]/ML
0-16 INJECTION, SOLUTION INTRAVENOUS; SUBCUTANEOUS
Status: DISCONTINUED | OUTPATIENT
Start: 2024-05-17 | End: 2024-05-20

## 2024-05-17 RX ORDER — DILTIAZEM HYDROCHLORIDE 180 MG/1
180 CAPSULE, COATED, EXTENDED RELEASE ORAL DAILY
Status: DISCONTINUED | OUTPATIENT
Start: 2024-05-18 | End: 2024-05-22

## 2024-05-17 RX ORDER — INSULIN LISPRO 100 [IU]/ML
0-4 INJECTION, SOLUTION INTRAVENOUS; SUBCUTANEOUS NIGHTLY
Status: DISCONTINUED | OUTPATIENT
Start: 2024-05-17 | End: 2024-05-20

## 2024-05-17 RX ADMIN — PSYLLIUM HUSK 1 PACKET: 3.4 POWDER ORAL at 19:11

## 2024-05-17 RX ADMIN — MICONAZOLE NITRATE: 2 POWDER TOPICAL at 09:43

## 2024-05-17 RX ADMIN — DIPHENHYDRAMINE HYDROCHLORIDE AND ZINC ACETATE: 10; 1 CREAM TOPICAL at 22:00

## 2024-05-17 RX ADMIN — ERGOCALCIFEROL 50000 UNITS: 1.25 CAPSULE ORAL at 09:41

## 2024-05-17 RX ADMIN — ATORVASTATIN CALCIUM 20 MG: 20 TABLET, FILM COATED ORAL at 09:41

## 2024-05-17 RX ADMIN — IPRATROPIUM BROMIDE 2 PUFF: 17 AEROSOL, METERED RESPIRATORY (INHALATION) at 07:14

## 2024-05-17 RX ADMIN — INSULIN GLARGINE 15 UNITS: 100 INJECTION, SOLUTION SUBCUTANEOUS at 09:43

## 2024-05-17 RX ADMIN — METOPROLOL SUCCINATE 50 MG: 50 TABLET, EXTENDED RELEASE ORAL at 09:41

## 2024-05-17 RX ADMIN — LEVALBUTEROL HYDROCHLORIDE 0.63 MG: 0.63 SOLUTION RESPIRATORY (INHALATION) at 21:28

## 2024-05-17 RX ADMIN — DILTIAZEM HYDROCHLORIDE 180 MG: 180 CAPSULE, COATED, EXTENDED RELEASE ORAL at 09:41

## 2024-05-17 RX ADMIN — PREDNISONE 40 MG: 20 TABLET ORAL at 09:41

## 2024-05-17 RX ADMIN — Medication 15 G: at 18:58

## 2024-05-17 RX ADMIN — IPRATROPIUM BROMIDE 2 PUFF: 17 AEROSOL, METERED RESPIRATORY (INHALATION) at 14:34

## 2024-05-17 RX ADMIN — BUDESONIDE AND FORMOTEROL FUMARATE DIHYDRATE 2 PUFF: 160; 4.5 AEROSOL RESPIRATORY (INHALATION) at 18:56

## 2024-05-17 RX ADMIN — DIPHENHYDRAMINE HYDROCHLORIDE AND ZINC ACETATE: 10; 1 CREAM TOPICAL at 09:42

## 2024-05-17 RX ADMIN — INSULIN GLARGINE 15 UNITS: 100 INJECTION, SOLUTION SUBCUTANEOUS at 21:26

## 2024-05-17 RX ADMIN — LEVALBUTEROL HYDROCHLORIDE 0.63 MG: 0.63 SOLUTION RESPIRATORY (INHALATION) at 14:35

## 2024-05-17 RX ADMIN — INSULIN LISPRO 16 UNITS: 100 INJECTION, SOLUTION INTRAVENOUS; SUBCUTANEOUS at 18:58

## 2024-05-17 RX ADMIN — SODIUM CHLORIDE: 9 INJECTION, SOLUTION INTRAVENOUS at 11:52

## 2024-05-17 RX ADMIN — BUDESONIDE AND FORMOTEROL FUMARATE DIHYDRATE 2 PUFF: 160; 4.5 AEROSOL RESPIRATORY (INHALATION) at 07:14

## 2024-05-17 RX ADMIN — IPRATROPIUM BROMIDE 2 PUFF: 17 AEROSOL, METERED RESPIRATORY (INHALATION) at 11:05

## 2024-05-17 RX ADMIN — DIPHENHYDRAMINE HYDROCHLORIDE 25 MG: 25 TABLET ORAL at 09:42

## 2024-05-17 RX ADMIN — Medication 5000 UNITS: at 09:41

## 2024-05-17 RX ADMIN — APIXABAN 5 MG: 5 TABLET, FILM COATED ORAL at 21:29

## 2024-05-17 RX ADMIN — APIXABAN 5 MG: 5 TABLET, FILM COATED ORAL at 09:41

## 2024-05-17 RX ADMIN — INSULIN LISPRO 4 UNITS: 100 INJECTION, SOLUTION INTRAVENOUS; SUBCUTANEOUS at 21:26

## 2024-05-17 RX ADMIN — LEVALBUTEROL HYDROCHLORIDE 0.63 MG: 0.63 SOLUTION RESPIRATORY (INHALATION) at 07:12

## 2024-05-17 RX ADMIN — DONEPEZIL HYDROCHLORIDE 10 MG: 10 TABLET, FILM COATED ORAL at 21:29

## 2024-05-17 RX ADMIN — IPRATROPIUM BROMIDE 2 PUFF: 17 AEROSOL, METERED RESPIRATORY (INHALATION) at 18:56

## 2024-05-17 RX ADMIN — INSULIN LISPRO 3 UNITS: 100 INJECTION, SOLUTION INTRAVENOUS; SUBCUTANEOUS at 12:05

## 2024-05-17 RX ADMIN — DIPHENHYDRAMINE HYDROCHLORIDE AND ZINC ACETATE: 10; 1 CREAM TOPICAL at 12:06

## 2024-05-17 ASSESSMENT — PAIN - FUNCTIONAL ASSESSMENT: PAIN_FUNCTIONAL_ASSESSMENT: PREVENTS OR INTERFERES SOME ACTIVE ACTIVITIES AND ADLS

## 2024-05-17 ASSESSMENT — PAIN DESCRIPTION - PAIN TYPE: TYPE: ACUTE PAIN

## 2024-05-17 ASSESSMENT — PAIN DESCRIPTION - LOCATION: LOCATION: GENERALIZED

## 2024-05-17 ASSESSMENT — PAIN SCALES - GENERAL: PAINLEVEL_OUTOF10: 0

## 2024-05-17 ASSESSMENT — PAIN DESCRIPTION - ORIENTATION: ORIENTATION: LEFT;POSTERIOR;MID

## 2024-05-17 NOTE — PLAN OF CARE
Problem: Discharge Planning  Goal: Discharge to home or other facility with appropriate resources  5/16/2024 2007 by Rivka Benoit RN  Outcome: Progressing  5/16/2024 0718 by Rivka Benoit RN  Outcome: Progressing     Problem: Safety - Adult  Goal: Free from fall injury  5/16/2024 2007 by Rivka Benoit RN  Outcome: Progressing  5/16/2024 0718 by Rivka Benoit RN  Outcome: Progressing     Problem: ABCDS Injury Assessment  Goal: Absence of physical injury  5/16/2024 2007 by Rivka Benoit RN  Outcome: Progressing  5/16/2024 0718 by Rivka Benoit RN  Outcome: Progressing     Problem: Skin/Tissue Integrity  Goal: Absence of new skin breakdown  Description:  Monitor for areas of redness and/or skin breakdown    5/16/2024 2007 by Rivka Benoit RN  Outcome: Progressing  5/16/2024 0718 by Rivka Benoit RN  Outcome: Progressing     Problem: Pain  Goal: Verbalizes/displays adequate comfort level or baseline comfort level  5/16/2024 2007 by Rivka Benoit RN  Outcome: Progressing  5/16/2024 0718 by Rivka Benoit RN  Outcome: Progressing     Problem: Chronic Conditions and Co-morbidities  Goal: Patient's chronic conditions and co-morbidity symptoms are monitored and maintained or improved  5/16/2024 2007 by Rivka Benoit RN  Outcome: Progressing  5/16/2024 0718 by Rivka Benoit RN  Outcome: Progressing     Problem: Neurosensory - Adult  Goal: Achieves stable or improved neurological status  5/16/2024 2007 by Rivka Benoit RN  Outcome: Progressing  5/16/2024 0718 by Rivka Benoit RN  Outcome: Progressing     Problem: Respiratory - Adult  Goal: Achieves optimal ventilation and oxygenation  5/16/2024 2007 by Rivka Benoit RN  Outcome: Progressing  5/16/2024 0718 by Rivka Benoit RN  Outcome: Progressing     Problem: Cardiovascular - Adult  Goal: Maintains optimal cardiac output and hemodynamic stability  5/16/2024 2007 by Rivka Benoit RN  Outcome:

## 2024-05-17 NOTE — PROGRESS NOTES
First part of this shift heart rate 25 - 29 junct vs a fib/ flutter.  Orders received from Dr. Perkins. 1530 yesterday til 0200 rate 24 to 54 then 0200 til 0400 110-119.  I have taken care of Mrs. Cat for the last two nightshifts and she  has a progressing rash ( from carmencita groins basically up to and covering her head and face and most areas inbetween).  Order received for F/C insertion patient screaming it \"hurts too much\" when providing yoselin care and order for skin care consult put in.

## 2024-05-17 NOTE — PROGRESS NOTES
Daily Progress Note    Date:2024  Patient: Edie Cat  : 1945  MRN:[id number]  CODE:Full Code No additional code details  PCP:Dudley Eduardo    Admit Date: 2024  7:17 PM   LOS: 15 days     No chief complaint on file.        Subjective: NAEON. Diffuse rash is less itchy, painful.         Review of Systems   All other systems reviewed and are negative.      Objective:      Vital signs in last 24 hours:  Patient Vitals for the past 24 hrs:   BP Temp Temp src Pulse Resp SpO2   24 1618 97/67 98.4 °F (36.9 °C) Temporal (!) 124 16 --   24 1148 117/67 98.1 °F (36.7 °C) Temporal (!) 130 16 95 %   24 0744 115/68 97.7 °F (36.5 °C) Temporal 89 16 90 %   24 0712 -- -- -- -- -- 96 %   24 0419 113/65 98.1 °F (36.7 °C) Temporal (!) 123 16 100 %   24 2340 128/66 97.3 °F (36.3 °C) Temporal (!) 43 16 97 %   24 115/66 (!) 96.6 °F (35.9 °C) Temporal 81 16 99 %       I/O last 3 completed shifts:  In: 480 [P.O.:480]  Out: 500 [Urine:500]  I/O this shift:  In: 240 [P.O.:240]  Out: 400 [Urine:400]    Physical Exam  Constitutional:       General: She is not in acute distress.     Appearance: She is not toxic-appearing.   Cardiovascular:      Rate and Rhythm: Normal rate and regular rhythm.      Pulses: Normal pulses.      Heart sounds: Normal heart sounds.   Pulmonary:      Effort: Pulmonary effort is normal. No respiratory distress.      Breath sounds: Normal breath sounds.   Abdominal:      General: Bowel sounds are normal. There is no distension.      Palpations: Abdomen is soft.      Tenderness: There is no abdominal tenderness.   Skin:     Comments: Diffuse maculopapular erythematous rash along torso, back, chest, arms, legs, groin; several areas are starting to peel             Lab Review   Recent Results (from the past 24 hour(s))   POCT Glucose    Collection Time: 24  8:06 PM   Result Value Ref Range    POC Glucose 334 (H) 70 - 99 mg/dl    Performed on AccuChek     Basic Metabolic Panel w/ Reflex to MG    Collection Time: 05/17/24  3:06 PM   Result Value Ref Range    Sodium 122 (L) 136 - 145 mmol/L    Potassium reflex Magnesium 4.9 3.5 - 5.0 mmol/L    Chloride 87 (L) 98 - 111 mmol/L    CO2 22 22 - 29 mmol/L    Anion Gap 13 7 - 19 mmol/L    Glucose 391 (H) 74 - 109 mg/dL    BUN 63 (H) 8 - 23 mg/dL    Creatinine 1.8 (H) 0.5 - 0.9 mg/dL    Est, Glom Filt Rate 28 (A) >60    Calcium 8.5 (L) 8.8 - 10.2 mg/dL   POCT Glucose    Collection Time: 05/17/24  4:19 PM   Result Value Ref Range    POC Glucose 371 (H) 70 - 99 mg/dl    Performed on AccuChek              Current Facility-Administered Medications:     magic butt cream, , Topical, Q4H PRN, Juan Manuel Maradiaga MD    diphenhydrAMINE (BENADRYL) tablet 25 mg, 25 mg, Oral, Q6H PRN, Sincere Núñez MD, 25 mg at 05/17/24 0942    0.9 % sodium chloride infusion, , IntraVENous, Continuous, Sincere Núñez MD, Last Rate: 100 mL/hr at 05/17/24 1152, New Bag at 05/17/24 1152    [START ON 5/18/2024] dilTIAZem (CARDIZEM CD) extended release capsule 180 mg, 180 mg, Oral, Daily, Cruz Perkins MD    predniSONE (DELTASONE) tablet 40 mg, 40 mg, Oral, Daily, Rasta Madsen MD, 40 mg at 05/17/24 0941    DOBUTamine (DOBUTREX) 500 mg in dextrose 5 % 250 mL infusion, 2.5 mcg/kg/min, IntraVENous, Continuous PRN, Cruz Perkins MD    metoprolol succinate (TOPROL XL) extended release tablet 50 mg, 50 mg, Oral, Daily, Cruz Perkins MD, 50 mg at 05/17/24 0941    insulin glargine (LANTUS) injection vial 15 Units, 15 Units, SubCUTAneous, BID, Rasta Madsen MD, 15 Units at 05/17/24 0943    insulin lispro (HUMALOG,ADMELOG) injection vial 0-4 Units, 0-4 Units, SubCUTAneous, TID WC, Rasta Madsen MD, 3 Units at 05/17/24 1205    insulin lispro (HUMALOG,ADMELOG) injection vial 0-4 Units, 0-4 Units, SubCUTAneous, Nightly, Rasta Madsen MD, 4 Units at 05/15/24 2317    diphenhydrAMINE-zinc acetate (BENADRYL) cream, , Topical, 4x daily, Rasta Madsen MD, Given at 05/17/24 1206

## 2024-05-17 NOTE — PROGRESS NOTES
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     Priority: Low    Arthritis, degenerative 09/26/2011     Priority: Low    Dementia (HCC) 09/26/2011     Priority: Low    Former cigarette smoker 09/26/2011     Priority: Low    Psoriasis 09/26/2011     Priority: Low    Hyponatremia 09/26/2011     Priority: Low    Vitamin D deficiency 09/26/2011     Priority: Low    Atrial fibrillation (HCC) 08/12/2011     Priority: Low     Overview Note:     8/12/2011 DCCV on aminodarone      CAD (coronary artery disease) 08/12/2011     Priority: Low     Overview Note:     8/15/2011  cardiolyte negative for myocardial ischemia, EF  66%         Admit Date:  5/2/2024    Admission Problem List: Present on Admission:   (Resolved) COVID-19      Cardiac Specific Data:  Specialty Problems          Cardiology Problems    Atrial fibrillation (HCC)        CAD (coronary artery disease)        Pulmonary embolus (HCC)        Systemic hypertension         1.  Persistent atrial flutter, atypical, on amiodarone and Coumadin, normal LV systolic function with moderate left atrial enlargement, mild aortic stenosis.  2.  Moderate dementia.  3.  It is requiring diabetes mellitus.  4.  COVID infection.    Subjective:  Ms. Cat reports no significant change in status.  Noted to be bradycardic this evening.  Was tachycardic earlier in the day.  Does not report lightheadedness but is

## 2024-05-17 NOTE — PROGRESS NOTES
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     Priority: Low    Arthritis, degenerative 09/26/2011     Priority: Low    Dementia (HCC) 09/26/2011     Priority: Low    Former cigarette smoker 09/26/2011     Priority: Low    Psoriasis 09/26/2011     Priority: Low    Hyponatremia 09/26/2011     Priority: Low    Vitamin D deficiency 09/26/2011     Priority: Low    Atrial fibrillation (HCC) 08/12/2011     Priority: Low     Overview Note:     8/12/2011 DCCV on aminodarone      CAD (coronary artery disease) 08/12/2011     Priority: Low     Overview Note:     8/15/2011  cardiolyte negative for myocardial ischemia, EF  66%         Admit Date:  5/2/2024    Admission Problem List: Present on Admission:   (Resolved) COVID-19      Cardiac Specific Data:  Specialty Problems          Cardiology Problems    Atrial fibrillation (HCC)        CAD (coronary artery disease)        Pulmonary embolus (HCC)        Systemic hypertension         1.  Persistent atrial flutter, atypical, on amiodarone and Coumadin, normal LV systolic function with moderate left atrial enlargement, mild aortic stenosis.  2.  Moderate dementia.  3.  It is requiring diabetes mellitus.  4.  COVID infection.    Subjective:  Ms. Cat was significantly bradycardic last evening after being tachycardic in the morning.  Was in junctional rhythm.  Given atropine and dopamine was hung but not started

## 2024-05-17 NOTE — PROGRESS NOTES
Physical Therapy  Name: Edie Cat  MRN:  [id number]  Date of service:  5/17/2024    Patient on bed pan.  She is having BM issues and declines up to chair today.  Physical Therapy will continue to follow and progress as able.    Electronically signed by Ghazala Tabares PTA on 5/17/2024 at 3:14 PM

## 2024-05-18 ENCOUNTER — APPOINTMENT (OUTPATIENT)
Dept: GENERAL RADIOLOGY | Age: 79
End: 2024-05-18
Attending: INTERNAL MEDICINE
Payer: MEDICARE

## 2024-05-18 LAB
ANION GAP SERPL CALCULATED.3IONS-SCNC: 14 MMOL/L (ref 7–19)
BASOPHILS # BLD: 0 K/UL (ref 0–0.2)
BASOPHILS NFR BLD: 0.2 % (ref 0–1)
BUN SERPL-MCNC: 72 MG/DL (ref 8–23)
CALCIUM SERPL-MCNC: 8.6 MG/DL (ref 8.8–10.2)
CHLORIDE SERPL-SCNC: 88 MMOL/L (ref 98–111)
CO2 SERPL-SCNC: 25 MMOL/L (ref 22–29)
CREAT SERPL-MCNC: 1.6 MG/DL (ref 0.5–0.9)
EOSINOPHIL # BLD: 0 K/UL (ref 0–0.6)
EOSINOPHIL NFR BLD: 0 % (ref 0–5)
ERYTHROCYTE [DISTWIDTH] IN BLOOD BY AUTOMATED COUNT: 15 % (ref 11.5–14.5)
GLUCOSE BLD-MCNC: 257 MG/DL (ref 70–99)
GLUCOSE BLD-MCNC: 297 MG/DL (ref 70–99)
GLUCOSE BLD-MCNC: 308 MG/DL (ref 70–99)
GLUCOSE BLD-MCNC: 321 MG/DL (ref 70–99)
GLUCOSE BLD-MCNC: 328 MG/DL (ref 70–99)
GLUCOSE BLD-MCNC: 365 MG/DL (ref 70–99)
GLUCOSE SERPL-MCNC: 377 MG/DL (ref 74–109)
HCT VFR BLD AUTO: 39.1 % (ref 37–47)
HGB BLD-MCNC: 11.9 G/DL (ref 12–16)
IMM GRANULOCYTES # BLD: 0.2 K/UL
LYMPHOCYTES # BLD: 0.8 K/UL (ref 1.1–4.5)
LYMPHOCYTES NFR BLD: 4.2 % (ref 20–40)
MCH RBC QN AUTO: 24.8 PG (ref 27–31)
MCHC RBC AUTO-ENTMCNC: 30.4 G/DL (ref 33–37)
MCV RBC AUTO: 81.5 FL (ref 81–99)
MONOCYTES # BLD: 0.6 K/UL (ref 0–0.9)
MONOCYTES NFR BLD: 2.9 % (ref 0–10)
NEUTROPHILS # BLD: 18.4 K/UL (ref 1.5–7.5)
NEUTS SEG NFR BLD: 91.7 % (ref 50–65)
PERFORMED ON: ABNORMAL
PLATELET # BLD AUTO: 341 K/UL (ref 130–400)
PMV BLD AUTO: 9.3 FL (ref 9.4–12.3)
POTASSIUM SERPL-SCNC: 5.1 MMOL/L (ref 3.5–5)
RBC # BLD AUTO: 4.8 M/UL (ref 4.2–5.4)
REASON FOR REJECTION: NORMAL
REJECTED TEST: NORMAL
SODIUM SERPL-SCNC: 127 MMOL/L (ref 136–145)
WBC # BLD AUTO: 20.1 K/UL (ref 4.8–10.8)

## 2024-05-18 PROCEDURE — 97110 THERAPEUTIC EXERCISES: CPT

## 2024-05-18 PROCEDURE — 6360000002 HC RX W HCPCS: Performed by: INTERNAL MEDICINE

## 2024-05-18 PROCEDURE — 6370000000 HC RX 637 (ALT 250 FOR IP): Performed by: INTERNAL MEDICINE

## 2024-05-18 PROCEDURE — 6370000000 HC RX 637 (ALT 250 FOR IP): Performed by: STUDENT IN AN ORGANIZED HEALTH CARE EDUCATION/TRAINING PROGRAM

## 2024-05-18 PROCEDURE — 71045 X-RAY EXAM CHEST 1 VIEW: CPT

## 2024-05-18 PROCEDURE — 2500000003 HC RX 250 WO HCPCS: Performed by: INTERNAL MEDICINE

## 2024-05-18 PROCEDURE — 80048 BASIC METABOLIC PNL TOTAL CA: CPT

## 2024-05-18 PROCEDURE — 82962 GLUCOSE BLOOD TEST: CPT

## 2024-05-18 PROCEDURE — 94760 N-INVAS EAR/PLS OXIMETRY 1: CPT

## 2024-05-18 PROCEDURE — 2700000000 HC OXYGEN THERAPY PER DAY

## 2024-05-18 PROCEDURE — 2140000000 HC CCU INTERMEDIATE R&B

## 2024-05-18 PROCEDURE — 36415 COLL VENOUS BLD VENIPUNCTURE: CPT

## 2024-05-18 PROCEDURE — 97530 THERAPEUTIC ACTIVITIES: CPT

## 2024-05-18 PROCEDURE — 94640 AIRWAY INHALATION TREATMENT: CPT

## 2024-05-18 PROCEDURE — 85025 COMPLETE CBC W/AUTO DIFF WBC: CPT

## 2024-05-18 RX ORDER — INSULIN GLARGINE 100 [IU]/ML
20 INJECTION, SOLUTION SUBCUTANEOUS 2 TIMES DAILY
Status: DISCONTINUED | OUTPATIENT
Start: 2024-05-18 | End: 2024-05-20

## 2024-05-18 RX ADMIN — INSULIN LISPRO 8 UNITS: 100 INJECTION, SOLUTION INTRAVENOUS; SUBCUTANEOUS at 10:21

## 2024-05-18 RX ADMIN — LEVALBUTEROL HYDROCHLORIDE 0.63 MG: 0.63 SOLUTION RESPIRATORY (INHALATION) at 15:11

## 2024-05-18 RX ADMIN — DIPHENHYDRAMINE HYDROCHLORIDE AND ZINC ACETATE: 10; 1 CREAM TOPICAL at 21:01

## 2024-05-18 RX ADMIN — BUDESONIDE AND FORMOTEROL FUMARATE DIHYDRATE 2 PUFF: 160; 4.5 AEROSOL RESPIRATORY (INHALATION) at 20:13

## 2024-05-18 RX ADMIN — DIPHENHYDRAMINE HYDROCHLORIDE AND ZINC ACETATE: 10; 1 CREAM TOPICAL at 12:36

## 2024-05-18 RX ADMIN — IPRATROPIUM BROMIDE 2 PUFF: 17 AEROSOL, METERED RESPIRATORY (INHALATION) at 20:13

## 2024-05-18 RX ADMIN — INSULIN LISPRO 8 UNITS: 100 INJECTION, SOLUTION INTRAVENOUS; SUBCUTANEOUS at 12:40

## 2024-05-18 RX ADMIN — ATORVASTATIN CALCIUM 20 MG: 20 TABLET, FILM COATED ORAL at 10:20

## 2024-05-18 RX ADMIN — INSULIN GLARGINE 20 UNITS: 100 INJECTION, SOLUTION SUBCUTANEOUS at 21:00

## 2024-05-18 RX ADMIN — PSYLLIUM HUSK 1 PACKET: 3.4 POWDER ORAL at 10:19

## 2024-05-18 RX ADMIN — APIXABAN 5 MG: 5 TABLET, FILM COATED ORAL at 21:00

## 2024-05-18 RX ADMIN — APIXABAN 5 MG: 5 TABLET, FILM COATED ORAL at 10:20

## 2024-05-18 RX ADMIN — INSULIN GLARGINE 20 UNITS: 100 INJECTION, SOLUTION SUBCUTANEOUS at 10:22

## 2024-05-18 RX ADMIN — DIPHENHYDRAMINE HYDROCHLORIDE AND ZINC ACETATE: 10; 1 CREAM TOPICAL at 10:20

## 2024-05-18 RX ADMIN — MICONAZOLE NITRATE: 2 POWDER TOPICAL at 10:20

## 2024-05-18 RX ADMIN — LEVALBUTEROL HYDROCHLORIDE 0.63 MG: 0.63 SOLUTION RESPIRATORY (INHALATION) at 06:36

## 2024-05-18 RX ADMIN — IPRATROPIUM BROMIDE 2 PUFF: 17 AEROSOL, METERED RESPIRATORY (INHALATION) at 06:36

## 2024-05-18 RX ADMIN — PREDNISONE 40 MG: 20 TABLET ORAL at 10:20

## 2024-05-18 RX ADMIN — DIPHENHYDRAMINE HYDROCHLORIDE AND ZINC ACETATE: 10; 1 CREAM TOPICAL at 18:38

## 2024-05-18 RX ADMIN — PSYLLIUM HUSK 1 PACKET: 3.4 POWDER ORAL at 12:36

## 2024-05-18 RX ADMIN — METOPROLOL SUCCINATE 50 MG: 50 TABLET, EXTENDED RELEASE ORAL at 10:20

## 2024-05-18 RX ADMIN — LEVALBUTEROL HYDROCHLORIDE 0.63 MG: 0.63 SOLUTION RESPIRATORY (INHALATION) at 22:06

## 2024-05-18 RX ADMIN — Medication 5000 UNITS: at 10:20

## 2024-05-18 RX ADMIN — HYDROCORTISONE ACETATE: 1 CREAM TOPICAL at 11:00

## 2024-05-18 RX ADMIN — INSULIN LISPRO 4 UNITS: 100 INJECTION, SOLUTION INTRAVENOUS; SUBCUTANEOUS at 21:00

## 2024-05-18 RX ADMIN — DONEPEZIL HYDROCHLORIDE 10 MG: 10 TABLET, FILM COATED ORAL at 21:00

## 2024-05-18 RX ADMIN — DILTIAZEM HYDROCHLORIDE 180 MG: 180 CAPSULE, COATED, EXTENDED RELEASE ORAL at 10:58

## 2024-05-18 RX ADMIN — IPRATROPIUM BROMIDE 2 PUFF: 17 AEROSOL, METERED RESPIRATORY (INHALATION) at 15:12

## 2024-05-18 RX ADMIN — Medication 15 G: at 10:19

## 2024-05-18 RX ADMIN — BUDESONIDE AND FORMOTEROL FUMARATE DIHYDRATE 2 PUFF: 160; 4.5 AEROSOL RESPIRATORY (INHALATION) at 06:36

## 2024-05-18 RX ADMIN — INSULIN LISPRO 12 UNITS: 100 INJECTION, SOLUTION INTRAVENOUS; SUBCUTANEOUS at 18:39

## 2024-05-18 NOTE — PROGRESS NOTES
Physical Therapy  Edie Cat  [id number]       05/18/24 7004   Restrictions/Precautions   Restrictions/Precautions Isolation;Fall Risk   Subjective   Subjective Pt willing to participate   General Comment   Comments RN, randa Garcia treatment this date.   Pain Assessment   Pain Assessment None - Denies Pain   Oxygen Therapy   O2 Device Nasal cannula   O2 Flow Rate (L/min) 3 L/min   Bed Mobility   Rolling Stand by assistance   Supine to Sit Contact guard assistance   Sit to Supine Minimal assistance   Scooting Stand by assistance   Transfers   Sit to Stand Stand by assistance;Contact guard assistance  (with RW from bed with specialty mattress)   Stand to Sit Contact guard assistance   Comment stood 1 min with fair- balance and RW with CGA x 1. Had to sit due to fatigue. No knee buckling.   Balance   Sitting - Static Fair   Sitting - Dynamic Fair;-   Exercises   Hip Flexion 10   Knee Long Arc Quad 10   Ankle Pumps 10   Comments 2-3 sets 10 AROM BLE seated EOB   Short Term Goals   Time Frame for Short Term Goals 2 WKS   Short Term Goal 1 SUP<>SIT, SBA   Short Term Goal 2 SIT<>STAND, CGA   Short Term Goal 3 TF'S WITH RW, CGA   Short Term Goal 4 AMB 50 FT WITH RW, MIN/CGA   Conditions Requiring Skilled Therapeutic Intervention   Body Structures, Functions, Activity Limitations Requiring Skilled Therapeutic Intervention Decreased functional mobility ;Decreased strength;Decreased endurance;Decreased balance;Decreased coordination   Assessment Pt still anxious during standing and required extra time and cues to let go of bed frame to grab walker. Required decreased assist with sitting balance, bed mobility, and sit to stand today. Pt continues with fatigue and shortness of breath and requires frequent rest breaks and extra time.   Discharge Recommendations Continue to assess pending progress;24 hour supervision or assist;Therapy recommended at discharge;Subacute/Skilled Nursing Facility   Activity Tolerance   Activity  Tolerance Patient tolerated treatment well;Patient limited by endurance   PT Plan of Care   Saturday X   Safety Devices   Type of Devices All fall risk precautions in place;Bed alarm in place;Call light within reach;Patient at risk for falls     Electronically signed by Yessenia Hebert PTA on 5/18/2024 at 1:10 PM

## 2024-05-18 NOTE — PROGRESS NOTES
Daily Progress Note    Date:2024  Patient: Edie Cat  : 1945  MRN:[id number]  CODE:Full Code No additional code details  PCP:Dudley Eduardo    Admit Date: 2024  7:17 PM   LOS: 16 days     No chief complaint on file.        Subjective: HR uncontrolled this morning, Afib with rates sustaining above 120. Rash continues to improve symptomatically. Requiring 3 L NC.         Review of Systems   All other systems reviewed and are negative.      Objective:      Vital signs in last 24 hours:  Patient Vitals for the past 24 hrs:   BP Temp Temp src Pulse Resp SpO2 Weight   24 1147 108/76 97.5 °F (36.4 °C) Temporal (!) 134 16 (!) 87 % --   24 0815 (!) 158/98 -- -- -- -- -- --   24 0741 (!) 174/163 97.2 °F (36.2 °C) Temporal 90 16 -- --   24 0637 -- -- -- -- -- (!) 54 % --   24 0411 94/66 97.6 °F (36.4 °C) Temporal (!) 119 16 98 % 91.5 kg (201 lb 12.8 oz)   24 2351 (!) 131/96 97.5 °F (36.4 °C) Temporal (!) 119 16 99 % --   24 1955 (!) 121/91 98.8 °F (37.1 °C) Temporal (!) 120 18 97 % --   24 1832 -- -- -- -- -- -- 90.7 kg (200 lb)   24 1618 97/67 98.4 °F (36.9 °C) Temporal (!) 124 16 -- --         I/O last 3 completed shifts:  In: 480 [P.O.:480]  Out: 2100 [Urine:2100]  I/O this shift:  In: 240 [P.O.:240]  Out: -     Physical Exam  Constitutional:       General: She is not in acute distress.     Appearance: She is not toxic-appearing.   Cardiovascular:      Rate and Rhythm: Normal rate and regular rhythm.      Pulses: Normal pulses.      Heart sounds: Normal heart sounds.   Pulmonary:      Effort: Pulmonary effort is normal. No respiratory distress.      Breath sounds: Normal breath sounds.   Abdominal:      General: Bowel sounds are normal. There is no distension.      Palpations: Abdomen is soft.      Tenderness: There is no abdominal tenderness.   Skin:     Comments: Diffuse maculopapular erythematous rash along torso, back, chest, arms, legs, groin;

## 2024-05-19 LAB
BASOPHILS # BLD: 0 K/UL (ref 0–0.2)
BASOPHILS NFR BLD: 0.1 % (ref 0–1)
EOSINOPHIL # BLD: 0 K/UL (ref 0–0.6)
EOSINOPHIL NFR BLD: 0.1 % (ref 0–5)
ERYTHROCYTE [DISTWIDTH] IN BLOOD BY AUTOMATED COUNT: 15 % (ref 11.5–14.5)
GLUCOSE BLD-MCNC: 159 MG/DL (ref 70–99)
GLUCOSE BLD-MCNC: 167 MG/DL (ref 70–99)
GLUCOSE BLD-MCNC: 233 MG/DL (ref 70–99)
GLUCOSE BLD-MCNC: 243 MG/DL (ref 70–99)
HCT VFR BLD AUTO: 33.9 % (ref 37–47)
HGB BLD-MCNC: 10.5 G/DL (ref 12–16)
IMM GRANULOCYTES # BLD: 0.1 K/UL
LYMPHOCYTES # BLD: 0.7 K/UL (ref 1.1–4.5)
LYMPHOCYTES NFR BLD: 3.6 % (ref 20–40)
MCH RBC QN AUTO: 25.1 PG (ref 27–31)
MCHC RBC AUTO-ENTMCNC: 31 G/DL (ref 33–37)
MCV RBC AUTO: 80.9 FL (ref 81–99)
MONOCYTES # BLD: 0.7 K/UL (ref 0–0.9)
MONOCYTES NFR BLD: 3.6 % (ref 0–10)
NEUTROPHILS # BLD: 18.9 K/UL (ref 1.5–7.5)
NEUTS SEG NFR BLD: 92 % (ref 50–65)
PERFORMED ON: ABNORMAL
PLATELET # BLD AUTO: 346 K/UL (ref 130–400)
PMV BLD AUTO: 9.4 FL (ref 9.4–12.3)
RBC # BLD AUTO: 4.19 M/UL (ref 4.2–5.4)
WBC # BLD AUTO: 20.6 K/UL (ref 4.8–10.8)

## 2024-05-19 PROCEDURE — 99232 SBSQ HOSP IP/OBS MODERATE 35: CPT | Performed by: INTERNAL MEDICINE

## 2024-05-19 PROCEDURE — 94640 AIRWAY INHALATION TREATMENT: CPT

## 2024-05-19 PROCEDURE — 6370000000 HC RX 637 (ALT 250 FOR IP): Performed by: INTERNAL MEDICINE

## 2024-05-19 PROCEDURE — 2140000000 HC CCU INTERMEDIATE R&B

## 2024-05-19 PROCEDURE — 6360000002 HC RX W HCPCS: Performed by: INTERNAL MEDICINE

## 2024-05-19 PROCEDURE — 6370000000 HC RX 637 (ALT 250 FOR IP): Performed by: STUDENT IN AN ORGANIZED HEALTH CARE EDUCATION/TRAINING PROGRAM

## 2024-05-19 PROCEDURE — 2700000000 HC OXYGEN THERAPY PER DAY

## 2024-05-19 PROCEDURE — 85025 COMPLETE CBC W/AUTO DIFF WBC: CPT

## 2024-05-19 PROCEDURE — 82962 GLUCOSE BLOOD TEST: CPT

## 2024-05-19 PROCEDURE — 94760 N-INVAS EAR/PLS OXIMETRY 1: CPT

## 2024-05-19 PROCEDURE — 36415 COLL VENOUS BLD VENIPUNCTURE: CPT

## 2024-05-19 RX ADMIN — PSYLLIUM HUSK 1 PACKET: 3.4 POWDER ORAL at 09:21

## 2024-05-19 RX ADMIN — INSULIN GLARGINE 20 UNITS: 100 INJECTION, SOLUTION SUBCUTANEOUS at 20:29

## 2024-05-19 RX ADMIN — IPRATROPIUM BROMIDE 2 PUFF: 17 AEROSOL, METERED RESPIRATORY (INHALATION) at 14:22

## 2024-05-19 RX ADMIN — BUDESONIDE AND FORMOTEROL FUMARATE DIHYDRATE 2 PUFF: 160; 4.5 AEROSOL RESPIRATORY (INHALATION) at 20:00

## 2024-05-19 RX ADMIN — IPRATROPIUM BROMIDE 2 PUFF: 17 AEROSOL, METERED RESPIRATORY (INHALATION) at 06:47

## 2024-05-19 RX ADMIN — LEVALBUTEROL HYDROCHLORIDE 0.63 MG: 0.63 SOLUTION RESPIRATORY (INHALATION) at 14:21

## 2024-05-19 RX ADMIN — DIPHENHYDRAMINE HYDROCHLORIDE AND ZINC ACETATE: 10; 1 CREAM TOPICAL at 09:29

## 2024-05-19 RX ADMIN — INSULIN LISPRO 4 UNITS: 100 INJECTION, SOLUTION INTRAVENOUS; SUBCUTANEOUS at 12:50

## 2024-05-19 RX ADMIN — HYDROCORTISONE ACETATE: 1 CREAM TOPICAL at 09:24

## 2024-05-19 RX ADMIN — MICONAZOLE NITRATE: 2 POWDER TOPICAL at 20:30

## 2024-05-19 RX ADMIN — INSULIN LISPRO 4 UNITS: 100 INJECTION, SOLUTION INTRAVENOUS; SUBCUTANEOUS at 09:26

## 2024-05-19 RX ADMIN — INSULIN GLARGINE 20 UNITS: 100 INJECTION, SOLUTION SUBCUTANEOUS at 09:24

## 2024-05-19 RX ADMIN — APIXABAN 5 MG: 5 TABLET, FILM COATED ORAL at 20:29

## 2024-05-19 RX ADMIN — APIXABAN 5 MG: 5 TABLET, FILM COATED ORAL at 09:22

## 2024-05-19 RX ADMIN — LEVALBUTEROL HYDROCHLORIDE 0.63 MG: 0.63 SOLUTION RESPIRATORY (INHALATION) at 22:36

## 2024-05-19 RX ADMIN — Medication 15 G: at 09:21

## 2024-05-19 RX ADMIN — BUDESONIDE AND FORMOTEROL FUMARATE DIHYDRATE 2 PUFF: 160; 4.5 AEROSOL RESPIRATORY (INHALATION) at 06:47

## 2024-05-19 RX ADMIN — IPRATROPIUM BROMIDE 2 PUFF: 17 AEROSOL, METERED RESPIRATORY (INHALATION) at 10:09

## 2024-05-19 RX ADMIN — MICONAZOLE NITRATE: 2 POWDER TOPICAL at 09:30

## 2024-05-19 RX ADMIN — DIPHENHYDRAMINE HYDROCHLORIDE AND ZINC ACETATE: 10; 1 CREAM TOPICAL at 20:29

## 2024-05-19 RX ADMIN — IPRATROPIUM BROMIDE 2 PUFF: 17 AEROSOL, METERED RESPIRATORY (INHALATION) at 20:00

## 2024-05-19 RX ADMIN — DONEPEZIL HYDROCHLORIDE 10 MG: 10 TABLET, FILM COATED ORAL at 20:29

## 2024-05-19 RX ADMIN — ATORVASTATIN CALCIUM 20 MG: 20 TABLET, FILM COATED ORAL at 09:22

## 2024-05-19 RX ADMIN — LEVALBUTEROL HYDROCHLORIDE 0.63 MG: 0.63 SOLUTION RESPIRATORY (INHALATION) at 06:47

## 2024-05-19 RX ADMIN — METOPROLOL SUCCINATE 50 MG: 50 TABLET, EXTENDED RELEASE ORAL at 09:22

## 2024-05-19 RX ADMIN — Medication 5000 UNITS: at 09:22

## 2024-05-19 RX ADMIN — DILTIAZEM HYDROCHLORIDE 180 MG: 180 CAPSULE, COATED, EXTENDED RELEASE ORAL at 09:22

## 2024-05-19 NOTE — PROGRESS NOTES
05/13/24 0346    vitamin D (ERGOCALCIFEROL) capsule 50,000 Units, 50,000 Units, Oral, Weekly, Darwin Ledesma DO, 50,000 Units at 05/17/24 0941    vitamin D (CHOLECALCIFEROL) capsule 5,000 Units, 5,000 Units, Oral, Daily, Darwin Ledesma DO, 5,000 Units at 05/19/24 0922    glucose chewable tablet 16 g, 4 tablet, Oral, PRN, Darwin Ledesma DO, 16 g at 05/15/24 0456    dextrose bolus 10% 125 mL, 125 mL, IntraVENous, PRN **OR** dextrose bolus 10% 250 mL, 250 mL, IntraVENous, PRN, Darwin Ledesma DO    glucagon injection 1 mg, 1 mg, SubCUTAneous, PRN, Darwin Ledesma DO    dextrose 10 % infusion, , IntraVENous, Continuous PRN, Darwin Ledesma DO, Last Rate: 100 mL/hr at 05/15/24 0532, New Bag at 05/15/24 0532    donepezil (ARICEPT) tablet 10 mg, 10 mg, Oral, Nightly, Darwin Ledesma DO, 10 mg at 05/18/24 2100    atorvastatin (LIPITOR) tablet 20 mg, 20 mg, Oral, Daily, Darwin Ledesma DO, 20 mg at 05/19/24 0922    potassium chloride 20 mEq/50 mL IVPB (Central Line), 20 mEq, IntraVENous, PRN **OR** potassium chloride 10 mEq/100 mL IVPB (Peripheral Line), 10 mEq, IntraVENous, PRN, Darwin Ledesma DO    magnesium sulfate 2000 mg in 50 mL IVPB premix, 2,000 mg, IntraVENous, PRN, Darwin Ledesma DO    acetaminophen (TYLENOL) tablet 650 mg, 650 mg, Oral, Q6H PRN, 650 mg at 05/15/24 2302 **OR** acetaminophen (TYLENOL) suppository 650 mg, 650 mg, Rectal, Q6H PRN, Darwin Ledesma DO      Imaging:  US RENAL COMPLETE    Result Date: 5/16/2024   No hydronephrosis.  Possible septation within the bladder lumen versus other echogenic debris.  Correlate with urinalysis.    ______________________________________ Electronically signed by: APOLINAR PÉREZ D.O. Date:     05/16/2024 Time:    14:40          Assessment/Plan  Principal Problem (Resolved):    COVID-19  Active Problems:    * No active hospital problems. *     Afib with RVR  Intermittent bradycardia  --  Cardiology following  -- Cardizem 180 mg daily 5/18; continue Toprol Xl 50 mg daily  -- Eliquis for AC  -- May need EP eval for ablation, PPM    COPD exacerbation, resolved  COVID 19 infection  -- Completed course of IV steroids and Azithro  -- Continue ICS/LABA/LAMA  -- PRN albuterol    Hypoxia  -- Possible fluid overload  -- Check CXR  -- Stop IVF    Diffuse rash  -- Likely drug reaction vs COVID induced  -- Improving, starting to peel  -- PRN PO Benadryl  -- Topical Benadryl  -- PO prednisone    TAJ on CKD  -- Prerenal due to increased insensible losses from diffuse rash  -- Renal function improving with IVF  -- Stop IVF, renal function better but O2 requirements increasing  -- Daily BMP    SIADH  -- Na improved with additional of urea packet  -- Fluid restriction 1 L  -- Continue Urea packet daily    T2DM  Steroid induced hyperglycemia  -- Increase Lantus to 20 units BID  -- Increase SSI to high      DVT prophylaxis: Eliquis    DISPOSITION:  Plan for SNF at discharge    Full Code No additional code details         After evaluating the complexity of problems addressed and management options selected today, I believe the patient's risk of complications and/or morbidity or mortality to be high.      Sincere Núñez MD 5/19/2024 2:28 PM

## 2024-05-19 NOTE — PROGRESS NOTES
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    Arthritis, degenerative 09/26/2011    Dementia (HCC) 09/26/2011    Former cigarette smoker 09/26/2011    Psoriasis 09/26/2011    Hyponatremia 09/26/2011    Vitamin D deficiency 09/26/2011    Atrial fibrillation (HCC) 08/12/2011    CAD (coronary artery disease) 08/12/2011       Admit Date:  5/2/2024    Admission Problem List: Present on Admission:   (Resolved) COVID-19      Cardiac Specific Data:  Specialty Problems          Cardiology Problems    Atrial fibrillation (HCC)        CAD (coronary artery disease)        Pulmonary embolus (HCC)        Systemic hypertension           Subjective:  Ms. Cat seen today for Dr. Perkins in his absence over the weekend admitted 5/2/2020 for shortness of breath has chronic atrial fibrillation on a apixaban also on Cardizem CD1 80 mg once daily and Toprol-XL 50 mg daily blood pressure 137/93 heart rate was in the 130s when I saw her earlier.  No reported chest pain dyspnea dizziness palpitations etc.    Objective:   BP (!) 137/93   Pulse (!) 114   Temp 97.5 °F (36.4 °C) (Temporal)   Resp 16   Ht 1.575 m (5' 2.01\")   Wt 94.8 kg (208 lb 14.4 oz)   SpO2 98%   BMI 38.20 kg/m²       Intake/Output Summary (Last 24 hours) at 5/19/2024 1042  Last data filed at 5/19/2024 0537  Gross per 24 hour   Intake --   Output 1400 ml   Net -1400 ml       Prior to Admission medications    Medication Sig Start Date End Date Taking? Authorizing Provider   amiodarone (CORDARONE) 200 MG tablet Take 1 tablet by mouth daily 5/8/24  Yes Darwin Ledesma

## 2024-05-20 PROBLEM — I48.19 ATRIAL FIBRILLATION, PERSISTENT (HCC): Status: ACTIVE | Noted: 2024-05-02

## 2024-05-20 LAB
AMORPH SED URNS QL MICRO: ABNORMAL /HPF
ANION GAP SERPL CALCULATED.3IONS-SCNC: 12 MMOL/L (ref 7–19)
BACTERIA #/AREA URNS HPF: ABNORMAL /HPF
BASOPHILS # BLD: 0.1 K/UL (ref 0–0.2)
BASOPHILS NFR BLD: 0.3 % (ref 0–1)
BILIRUB UR QL STRIP: NEGATIVE
BUN SERPL-MCNC: 76 MG/DL (ref 8–23)
CALCIUM SERPL-MCNC: 8.5 MG/DL (ref 8.8–10.2)
CHLORIDE SERPL-SCNC: 99 MMOL/L (ref 98–111)
CLARITY UR: ABNORMAL
CO2 SERPL-SCNC: 26 MMOL/L (ref 22–29)
COLOR UR: YELLOW
CREAT SERPL-MCNC: 1.5 MG/DL (ref 0.5–0.9)
CRYSTALS URNS MICRO: ABNORMAL /HPF
EOSINOPHIL # BLD: 1.4 K/UL (ref 0–0.6)
EOSINOPHIL NFR BLD: 7 % (ref 0–5)
ERYTHROCYTE [DISTWIDTH] IN BLOOD BY AUTOMATED COUNT: 15.2 % (ref 11.5–14.5)
GLUCOSE BLD-MCNC: 113 MG/DL (ref 70–99)
GLUCOSE BLD-MCNC: 162 MG/DL (ref 70–99)
GLUCOSE BLD-MCNC: 193 MG/DL (ref 70–99)
GLUCOSE BLD-MCNC: 226 MG/DL (ref 70–99)
GLUCOSE BLD-MCNC: 46 MG/DL (ref 70–99)
GLUCOSE BLD-MCNC: 64 MG/DL (ref 70–99)
GLUCOSE BLD-MCNC: 80 MG/DL (ref 70–99)
GLUCOSE BLD-MCNC: 89 MG/DL (ref 70–99)
GLUCOSE SERPL-MCNC: 35 MG/DL (ref 74–109)
GLUCOSE UR STRIP.AUTO-MCNC: NEGATIVE MG/DL
HCT VFR BLD AUTO: 34 % (ref 37–47)
HGB BLD-MCNC: 10.3 G/DL (ref 12–16)
HGB UR STRIP.AUTO-MCNC: ABNORMAL MG/L
IMM GRANULOCYTES # BLD: 0.1 K/UL
KETONES UR STRIP.AUTO-MCNC: NEGATIVE MG/DL
LEUKOCYTE ESTERASE UR QL STRIP.AUTO: ABNORMAL
LYMPHOCYTES # BLD: 2.5 K/UL (ref 1.1–4.5)
LYMPHOCYTES NFR BLD: 13 % (ref 20–40)
MCH RBC QN AUTO: 25.6 PG (ref 27–31)
MCHC RBC AUTO-ENTMCNC: 30.3 G/DL (ref 33–37)
MCV RBC AUTO: 84.6 FL (ref 81–99)
MONOCYTES # BLD: 1.1 K/UL (ref 0–0.9)
MONOCYTES NFR BLD: 5.8 % (ref 0–10)
NEUTROPHILS # BLD: 14.1 K/UL (ref 1.5–7.5)
NEUTS SEG NFR BLD: 73.4 % (ref 50–65)
NITRITE UR QL STRIP.AUTO: NEGATIVE
PERFORMED ON: ABNORMAL
PERFORMED ON: NORMAL
PERFORMED ON: NORMAL
PH UR STRIP.AUTO: >=9 [PH] (ref 5–8)
PLATELET # BLD AUTO: 328 K/UL (ref 130–400)
PMV BLD AUTO: 9.6 FL (ref 9.4–12.3)
POTASSIUM SERPL-SCNC: 4.7 MMOL/L (ref 3.5–5)
PROT UR STRIP.AUTO-MCNC: 300 MG/DL
RBC # BLD AUTO: 4.02 M/UL (ref 4.2–5.4)
RBC #/AREA URNS HPF: ABNORMAL /HPF (ref 0–2)
SARS-COV-2 RDRP RESP QL NAA+PROBE: NOT DETECTED
SODIUM SERPL-SCNC: 137 MMOL/L (ref 136–145)
SP GR UR STRIP.AUTO: 1.02 (ref 1–1.03)
SQUAMOUS #/AREA URNS HPF: ABNORMAL /HPF
UROBILINOGEN UR STRIP.AUTO-MCNC: 0.2 E.U./DL
WBC # BLD AUTO: 19.2 K/UL (ref 4.8–10.8)
WBC #/AREA URNS HPF: ABNORMAL /HPF (ref 0–5)

## 2024-05-20 PROCEDURE — 87086 URINE CULTURE/COLONY COUNT: CPT

## 2024-05-20 PROCEDURE — 6370000000 HC RX 637 (ALT 250 FOR IP): Performed by: INTERNAL MEDICINE

## 2024-05-20 PROCEDURE — 36415 COLL VENOUS BLD VENIPUNCTURE: CPT

## 2024-05-20 PROCEDURE — 82962 GLUCOSE BLOOD TEST: CPT

## 2024-05-20 PROCEDURE — 2140000000 HC CCU INTERMEDIATE R&B

## 2024-05-20 PROCEDURE — 85025 COMPLETE CBC W/AUTO DIFF WBC: CPT

## 2024-05-20 PROCEDURE — 94640 AIRWAY INHALATION TREATMENT: CPT

## 2024-05-20 PROCEDURE — 87186 SC STD MICRODIL/AGAR DIL: CPT

## 2024-05-20 PROCEDURE — 80048 BASIC METABOLIC PNL TOTAL CA: CPT

## 2024-05-20 PROCEDURE — 2700000000 HC OXYGEN THERAPY PER DAY

## 2024-05-20 PROCEDURE — 81001 URINALYSIS AUTO W/SCOPE: CPT

## 2024-05-20 PROCEDURE — 2580000003 HC RX 258: Performed by: INTERNAL MEDICINE

## 2024-05-20 PROCEDURE — 6360000002 HC RX W HCPCS: Performed by: INTERNAL MEDICINE

## 2024-05-20 PROCEDURE — 87635 SARS-COV-2 COVID-19 AMP PRB: CPT

## 2024-05-20 PROCEDURE — 87077 CULTURE AEROBIC IDENTIFY: CPT

## 2024-05-20 PROCEDURE — 6370000000 HC RX 637 (ALT 250 FOR IP): Performed by: STUDENT IN AN ORGANIZED HEALTH CARE EDUCATION/TRAINING PROGRAM

## 2024-05-20 PROCEDURE — 94760 N-INVAS EAR/PLS OXIMETRY 1: CPT

## 2024-05-20 PROCEDURE — 99232 SBSQ HOSP IP/OBS MODERATE 35: CPT | Performed by: INTERNAL MEDICINE

## 2024-05-20 RX ORDER — INSULIN GLARGINE 100 [IU]/ML
20 INJECTION, SOLUTION SUBCUTANEOUS NIGHTLY
Status: DISCONTINUED | OUTPATIENT
Start: 2024-05-20 | End: 2024-05-21

## 2024-05-20 RX ORDER — INSULIN LISPRO 100 [IU]/ML
0-4 INJECTION, SOLUTION INTRAVENOUS; SUBCUTANEOUS NIGHTLY
Status: DISCONTINUED | OUTPATIENT
Start: 2024-05-20 | End: 2024-05-25

## 2024-05-20 RX ORDER — INSULIN LISPRO 100 [IU]/ML
0-4 INJECTION, SOLUTION INTRAVENOUS; SUBCUTANEOUS
Status: DISCONTINUED | OUTPATIENT
Start: 2024-05-20 | End: 2024-05-25

## 2024-05-20 RX ADMIN — DILTIAZEM HYDROCHLORIDE 180 MG: 180 CAPSULE, COATED, EXTENDED RELEASE ORAL at 08:10

## 2024-05-20 RX ADMIN — IPRATROPIUM BROMIDE 2 PUFF: 17 AEROSOL, METERED RESPIRATORY (INHALATION) at 14:21

## 2024-05-20 RX ADMIN — METOPROLOL SUCCINATE 50 MG: 50 TABLET, EXTENDED RELEASE ORAL at 08:10

## 2024-05-20 RX ADMIN — LEVALBUTEROL HYDROCHLORIDE 0.63 MG: 0.63 SOLUTION RESPIRATORY (INHALATION) at 19:04

## 2024-05-20 RX ADMIN — BUDESONIDE AND FORMOTEROL FUMARATE DIHYDRATE 2 PUFF: 160; 4.5 AEROSOL RESPIRATORY (INHALATION) at 06:41

## 2024-05-20 RX ADMIN — DIPHENHYDRAMINE HYDROCHLORIDE AND ZINC ACETATE: 10; 1 CREAM TOPICAL at 21:38

## 2024-05-20 RX ADMIN — IPRATROPIUM BROMIDE 2 PUFF: 17 AEROSOL, METERED RESPIRATORY (INHALATION) at 06:41

## 2024-05-20 RX ADMIN — APIXABAN 5 MG: 5 TABLET, FILM COATED ORAL at 21:37

## 2024-05-20 RX ADMIN — ATORVASTATIN CALCIUM 20 MG: 20 TABLET, FILM COATED ORAL at 08:10

## 2024-05-20 RX ADMIN — LEVALBUTEROL HYDROCHLORIDE 0.63 MG: 0.63 SOLUTION RESPIRATORY (INHALATION) at 06:31

## 2024-05-20 RX ADMIN — DONEPEZIL HYDROCHLORIDE 10 MG: 10 TABLET, FILM COATED ORAL at 21:37

## 2024-05-20 RX ADMIN — Medication 5000 UNITS: at 08:25

## 2024-05-20 RX ADMIN — INSULIN GLARGINE 20 UNITS: 100 INJECTION, SOLUTION SUBCUTANEOUS at 21:37

## 2024-05-20 RX ADMIN — MICONAZOLE NITRATE: 2 POWDER TOPICAL at 21:38

## 2024-05-20 RX ADMIN — APIXABAN 5 MG: 5 TABLET, FILM COATED ORAL at 08:10

## 2024-05-20 RX ADMIN — LEVALBUTEROL HYDROCHLORIDE 0.63 MG: 0.63 SOLUTION RESPIRATORY (INHALATION) at 14:14

## 2024-05-20 RX ADMIN — DICLOFENAC SODIUM 2 G: 10 GEL TOPICAL at 06:36

## 2024-05-20 RX ADMIN — DEXTROSE MONOHYDRATE 125 ML: 100 INJECTION, SOLUTION INTRAVENOUS at 03:42

## 2024-05-20 RX ADMIN — IPRATROPIUM BROMIDE 2 PUFF: 17 AEROSOL, METERED RESPIRATORY (INHALATION) at 10:16

## 2024-05-20 RX ADMIN — MICONAZOLE NITRATE: 2 POWDER TOPICAL at 08:11

## 2024-05-20 RX ADMIN — DIPHENHYDRAMINE HYDROCHLORIDE AND ZINC ACETATE: 10; 1 CREAM TOPICAL at 08:11

## 2024-05-20 RX ADMIN — ACETAMINOPHEN 650 MG: 325 TABLET ORAL at 06:37

## 2024-05-20 RX ADMIN — IPRATROPIUM BROMIDE 2 PUFF: 17 AEROSOL, METERED RESPIRATORY (INHALATION) at 18:52

## 2024-05-20 RX ADMIN — Medication 15 G: at 08:25

## 2024-05-20 RX ADMIN — BUDESONIDE AND FORMOTEROL FUMARATE DIHYDRATE 2 PUFF: 160; 4.5 AEROSOL RESPIRATORY (INHALATION) at 18:54

## 2024-05-20 RX ADMIN — ACETAMINOPHEN 650 MG: 325 TABLET ORAL at 21:44

## 2024-05-20 RX ADMIN — DIPHENHYDRAMINE HYDROCHLORIDE AND ZINC ACETATE: 10; 1 CREAM TOPICAL at 15:56

## 2024-05-20 ASSESSMENT — PAIN DESCRIPTION - LOCATION: LOCATION: KNEE

## 2024-05-20 ASSESSMENT — PAIN DESCRIPTION - ORIENTATION: ORIENTATION: LEFT

## 2024-05-20 ASSESSMENT — PAIN DESCRIPTION - DESCRIPTORS: DESCRIPTORS: ACHING

## 2024-05-20 ASSESSMENT — PAIN - FUNCTIONAL ASSESSMENT: PAIN_FUNCTIONAL_ASSESSMENT: ACTIVITIES ARE NOT PREVENTED

## 2024-05-20 ASSESSMENT — PAIN SCALES - GENERAL: PAINLEVEL_OUTOF10: 6

## 2024-05-20 NOTE — PROGRESS NOTES
Infection Preventionist Note:     Noted initial COVID positive test result on 05/02/2024     Patient is now   days past onset of COVID symptoms with symptoms resolving.    Symptom Based Strategy for discontinuation of Droplet Plus Precautions have been met.      Recommendation:  Discontinuation of Droplet Plus precautions.

## 2024-05-20 NOTE — PROGRESS NOTES
ONLY.  HISTORY:  Congestive heart failure.  Increased oxygen requirement.  COMPARISON:  05/07/2024.  FINDINGS:  Cardiac silhouette enlarged.  Mild central vascular congestion has improved since the prior study.  Interstitial opacities have decreased since the prior study.  There are mild band-like opacities both lung bases with blunting of the left costophrenic angle.  No pneumothorax identified.  Degenerative changes present in the left shoulder.  --------------------------    Bibasilar atelectasis.  Decreased vascular congestion    ______________________________________ Electronically signed by: VON WALSH M.D. Date:     05/18/2024 Time:    15:42     US RENAL COMPLETE    Result Date: 5/16/2024  EXAM: RENAL (RETROPERITONEAL) ULTRASOUND  HISTORY: Acute kidney injury  TECHNIQUE: Sonography of the kidneys and urinary bladder was performed.  Images were obtained and stored in a permanent archive.  COMPARISON: None  FINDINGS:  Right Kidney: - Renal length: 10.3 cm. - Parenchyma: Normal echogenicity.  Normal parenchymal thickness. - Collecting system: No hydronephrosis. - Calculus: None. - Lesion: None.  Left Kidney: - Renal length: 9.1 cm. - Parenchyma: Normal echogenicity.  Normal parenchymal thickness. - Collecting system: No hydronephrosis. - Calculus: None. - Lesion: None.  Bladder: Normal distension.  No wall thickening.  No calculus.  There is a possible echogenic linear web or other debris within the bladder.        No hydronephrosis.  Possible septation within the bladder lumen versus other echogenic debris.  Correlate with urinalysis.    ______________________________________ Electronically signed by: APOLINAR PÉREZ D.O. Date:     05/16/2024 Time:    14:40     Echo (TTE) complete (PRN contrast/bubble/strain/3D)    Result Date: 5/9/2024    Left Ventricle: Low normal left ventricular systolic function with a visually estimated EF of 50 - 55%. Left ventricle size is normal. Findings consistent with mild  concentric hypertrophy. Normal wall motion.   Aortic Valve: Not well visualized. Moderate sclerosis of the aortic valve. Mild stenosis of the aortic valve. AV mean gradient is 8 mmHg. AV area by continuity VTI is 1.3 cm2.   Tricuspid Valve: The estimated RVSP is 39 mmHg.   Left Atrium: Left atrium is moderately dilated.     XR CHEST PORTABLE    Result Date: 5/7/2024  EXAM: CHEST RADIOGRAPH  TECHNIQUE: Single frontal chest radiograph.  HISTORY: Hypoxemia.  COMPARISON: 05/03/2024.  FINDINGS: Mild atelectasis at the lung bases.  Lungs are otherwise clear. The heart size is normal.  There is calcification in the aorta consistent with atherosclerosis. There is no pleural effusion. There is no pneumothorax.      1.  Mild atelectasis at the lung bases. 2.  Atherosclerosis. 3.  Otherwise unremarkable chest radiograph.    ______________________________________ Electronically signed by: SHAWN LICONA M.D. Date:     05/07/2024 Time:    17:13     XR CHEST PORTABLE    Result Date: 5/3/2024  EXAM:  AP CHEST.  HISTORY:  Labored breathing.  Comparison: Chest x-ray of 05/02/2024.  FINDINGS: The bones are unremarkable.  The cardiac silhouette is enlarged.  There are decreased left lung and stable right lung airspace opacities.  There is eventration of the right hemidiaphragm.      Impression:  Bilateral airspace opacities as described, consistent with edema versus infection.  Cardiomegaly.     ______________________________________ Electronically signed by: CHASTITY ROACH M.D. Date:     05/03/2024 Time:    04:11         Assessment and Plan:    This is a 78 y.o. year old female with past medical history of moderate dementia, nursing home resident, persistent atrial fibrillation, previously on amiodarone and Coumadin, normal LV ejection fraction, insulin requiring diabetes mellitus, admitted with COVID infection and noted to be in atrial fibrillation/flutter with RVR with difficult rate control.     1.  Difficult to control flutter/fib

## 2024-05-20 NOTE — PLAN OF CARE
including active listening and acknowledgement of concerns of patient and caregivers  3. Reduce environmental stimuli, as able  4. Instruct patient/family in relaxation techniques, as appropriate  5. Assess for spiritual pain/suffering and initiate Spiritual Care, Psychosocial Clinical Specialist consults as needed  5/20/2024 1153 by Roseann Dixon RN  Outcome: Progressing  5/20/2024 0036 by Tila Ruffin RN  Outcome: Progressing     Problem: Change in Body Image  Goal: Pt/Family communicate acceptance of loss or change in body image and feel psychological comfort and peace  Description: INTERVENTIONS:  1. Assess patient/family anxiety and grief process related to change in body image, loss of functional status, loss of sense of self, and forgiveness  2. Provide emotional and spiritual support  3. Provide information about the patient's health status with consideration of family and cultural values  4. Communicate willingness to discuss loss and facilitate grief process with patient/family as appropriate  5. Emphasize sustaining relationships within family system and community, or jeff/spiritual traditions  6. Initiate Spiritual Care, Psychosocial Clinical Specialist consult as needed  5/20/2024 1153 by Roseann Dixon RN  Outcome: Progressing  5/20/2024 0036 by Tila Ruffin RN  Outcome: Progressing     Problem: Decision Making  Goal: Pt/Family able to effectively weigh alternatives and participate in decision making related to treatment and care  Description: INTERVENTIONS:  1. Determine when there are differences between patient's view, family's view, and healthcare provider's view of condition  2. Facilitate patient and family articulation of goals for care  3. Help patient and family identify pros/cons of alternative solutions  4. Provide information as requested by patient/family  5. Respect patient/family right to receive or not to receive information  6. Serve as a liaison between patient and

## 2024-05-20 NOTE — PROGRESS NOTES
Daily Progress Note    Date:2024  Patient: Edie Cat  : 1945  MRN:[id number]  CODE:Full Code No additional code details  PCP:Dudley Eduardo    Admit Date: 2024  7:17 PM   LOS: 18 days     No chief complaint on file.        Subjective: Hypoglycemic overnight, improved with treatment. HR remains variable, had episodes of significant tachycardia overnight. Pt has no acute complaints. Rash continues to peel.         Review of Systems   All other systems reviewed and are negative.      Objective:      Vital signs in last 24 hours:  Patient Vitals for the past 24 hrs:   BP Temp Temp src Pulse Resp SpO2   24 1154 116/62 97.2 °F (36.2 °C) -- (!) 103 18 93 %   24 0811 117/63 (!) 96.1 °F (35.6 °C) Temporal (!) 111 20 100 %   24 0810 -- -- -- (!) 130 -- --   24 0634 -- -- -- -- -- 97 %   24 0536 99/64 97.7 °F (36.5 °C) Temporal (!) 116 16 98 %   24 0047 102/67 97.5 °F (36.4 °C) Temporal 55 18 98 %   24 1927 106/82 98.6 °F (37 °C) Temporal 95 16 99 %   24 1709 (!) 98/29 98.2 °F (36.8 °C) Temporal 52 20 99 %         I/O last 3 completed shifts:  In: 360 [P.O.:360]  Out: 1550 [Urine:1550]  I/O this shift:  In: 360 [P.O.:360]  Out: -     Physical Exam  Constitutional:       General: She is not in acute distress.     Appearance: She is not toxic-appearing.   Cardiovascular:      Rate and Rhythm: Normal rate and regular rhythm.      Pulses: Normal pulses.      Heart sounds: Normal heart sounds.   Pulmonary:      Effort: Pulmonary effort is normal. No respiratory distress.      Breath sounds: Normal breath sounds.   Abdominal:      General: Bowel sounds are normal. There is no distension.      Palpations: Abdomen is soft.      Tenderness: There is no abdominal tenderness.   Skin:     Comments: Diffuse maculopapular erythematous rash along torso, back, chest, arms, legs, groin; several areas are starting to peel             Lab Review   Recent Results (from the past 24  Range    POC Glucose 113 (H) 70 - 99 mg/dl    Performed on AccuChek    POCT Glucose    Collection Time: 05/20/24  5:35 AM   Result Value Ref Range    POC Glucose 89 70 - 99 mg/dl    Performed on AccuChek    POCT Glucose    Collection Time: 05/20/24  7:18 AM   Result Value Ref Range    POC Glucose 80 70 - 99 mg/dl    Performed on AccuChek    POCT Glucose    Collection Time: 05/20/24 11:12 AM   Result Value Ref Range    POC Glucose 162 (H) 70 - 99 mg/dl    Performed on AccuChek              Current Facility-Administered Medications:     insulin glargine (LANTUS) injection vial 20 Units, 20 Units, SubCUTAneous, Nightly, Sincere Núñez MD    insulin lispro (HUMALOG,ADMELOG) injection vial 0-4 Units, 0-4 Units, SubCUTAneous, TID WC, Sincere Núñez MD    insulin lispro (HUMALOG,ADMELOG) injection vial 0-4 Units, 0-4 Units, SubCUTAneous, Nightly, Sincere Núñez MD    magic butt cream, , Topical, Q4H PRN, Juan Manuel Maradiaga MD, Given at 05/19/24 0924    diphenhydrAMINE (BENADRYL) tablet 25 mg, 25 mg, Oral, Q6H PRN, Sincere Núñez MD, 25 mg at 05/17/24 0942    dilTIAZem (CARDIZEM CD) extended release capsule 180 mg, 180 mg, Oral, Daily, Cruz Perkins MD, 180 mg at 05/20/24 0810    urea (URE-NA) packet 15 g, 15 g, Oral, Daily, Sincere Núñez MD, 15 g at 05/20/24 0825    DOBUTamine (DOBUTREX) 500 mg in dextrose 5 % 250 mL infusion, 2.5 mcg/kg/min, IntraVENous, Continuous PRN, Cruz Perkins MD    metoprolol succinate (TOPROL XL) extended release tablet 50 mg, 50 mg, Oral, Daily, Cruz Perkins MD, 50 mg at 05/20/24 0810    diphenhydrAMINE-zinc acetate (BENADRYL) cream, , Topical, 4x daily, Rasta Madsen MD, Given at 05/20/24 0811    ondansetron (ZOFRAN) injection 4 mg, 4 mg, IntraVENous, Q6H PRN, Rasta Madsen MD    sennosides-docusate sodium (SENOKOT-S) 8.6-50 MG tablet 1 tablet, 1 tablet, Oral, Daily PRN, Rasta Madsen MD    psyllium husk-aspartame (METAMUCIL FIBER) packet 1 packet, 1 packet, Oral, TID WC, Rasta Madsen MD, 1 packet

## 2024-05-20 NOTE — PLAN OF CARE
Problem: Discharge Planning  Goal: Discharge to home or other facility with appropriate resources  Outcome: Progressing     Problem: Safety - Adult  Goal: Free from fall injury  Outcome: Progressing     Problem: ABCDS Injury Assessment  Goal: Absence of physical injury  Outcome: Progressing     Problem: Skin/Tissue Integrity  Goal: Absence of new skin breakdown  Description:  Monitor for areas of redness and/or skin breakdown    Outcome: Progressing     Problem: Pain  Goal: Verbalizes/displays adequate comfort level or baseline comfort level  Outcome: Progressing     Problem: Chronic Conditions and Co-morbidities  Goal: Patient's chronic conditions and co-morbidity symptoms are monitored and maintained or improved  Outcome: Progressing     Problem: Cardiovascular - Adult  Goal: Maintains optimal cardiac output and hemodynamic stability  Outcome: Progressing     Problem: Skin/Tissue Integrity - Adult  Goal: Skin integrity remains intact  Outcome: Progressing

## 2024-05-21 ENCOUNTER — HOSPITAL ENCOUNTER (INPATIENT)
Age: 79
Discharge: HOME | End: 2024-05-23
Attending: INTERNAL MEDICINE
Payer: MEDICARE

## 2024-05-21 ENCOUNTER — ANESTHESIA EVENT (OUTPATIENT)
Age: 79
End: 2024-05-21
Payer: MEDICARE

## 2024-05-21 ENCOUNTER — ANESTHESIA (OUTPATIENT)
Age: 79
End: 2024-05-21
Payer: MEDICARE

## 2024-05-21 VITALS
SYSTOLIC BLOOD PRESSURE: 99 MMHG | RESPIRATION RATE: 19 BRPM | DIASTOLIC BLOOD PRESSURE: 52 MMHG | OXYGEN SATURATION: 90 % | HEART RATE: 137 BPM

## 2024-05-21 LAB
ANION GAP SERPL CALCULATED.3IONS-SCNC: 13 MMOL/L (ref 7–19)
BASOPHILS # BLD: 0.1 K/UL (ref 0–0.2)
BASOPHILS NFR BLD: 0.4 % (ref 0–1)
BUN SERPL-MCNC: 71 MG/DL (ref 8–23)
CALCIUM SERPL-MCNC: 8.5 MG/DL (ref 8.8–10.2)
CHLORIDE SERPL-SCNC: 100 MMOL/L (ref 98–111)
CO2 SERPL-SCNC: 24 MMOL/L (ref 22–29)
CREAT SERPL-MCNC: 1.4 MG/DL (ref 0.5–0.9)
ECHO AV MEAN GRADIENT: 7 MMHG
ECHO AV MEAN GRADIENT: 7 MMHG
ECHO AV MEAN VELOCITY: 1.3 M/S
ECHO AV PEAK GRADIENT: 13 MMHG
ECHO AV PEAK VELOCITY: 1.8 M/S
ECHO AV VTI: 41.8 CM
ECHO BSA: 1.66 M2
EKG P AXIS: 55 DEGREES
EKG P-R INTERVAL: 208 MS
EKG Q-T INTERVAL: 376 MS
EKG QRS DURATION: 80 MS
EKG QTC CALCULATION (BAZETT): 398 MS
EKG T AXIS: 50 DEGREES
EOSINOPHIL # BLD: 1.1 K/UL (ref 0–0.6)
EOSINOPHIL NFR BLD: 6.9 % (ref 0–5)
ERYTHROCYTE [DISTWIDTH] IN BLOOD BY AUTOMATED COUNT: 15 % (ref 11.5–14.5)
GLUCOSE BLD-MCNC: 111 MG/DL (ref 70–99)
GLUCOSE BLD-MCNC: 153 MG/DL (ref 70–99)
GLUCOSE BLD-MCNC: 62 MG/DL (ref 70–99)
GLUCOSE BLD-MCNC: 63 MG/DL (ref 70–99)
GLUCOSE BLD-MCNC: 69 MG/DL (ref 70–99)
GLUCOSE BLD-MCNC: 76 MG/DL (ref 70–99)
GLUCOSE BLD-MCNC: 96 MG/DL (ref 70–99)
GLUCOSE SERPL-MCNC: 117 MG/DL (ref 74–109)
HCT VFR BLD AUTO: 32.9 % (ref 37–47)
HGB BLD-MCNC: 10.8 G/DL (ref 12–16)
IMM GRANULOCYTES # BLD: 0.2 K/UL
LYMPHOCYTES # BLD: 2.2 K/UL (ref 1.1–4.5)
LYMPHOCYTES NFR BLD: 14.4 % (ref 20–40)
MCH RBC QN AUTO: 25.5 PG (ref 27–31)
MCHC RBC AUTO-ENTMCNC: 32.8 G/DL (ref 33–37)
MCV RBC AUTO: 77.8 FL (ref 81–99)
MONOCYTES # BLD: 0.8 K/UL (ref 0–0.9)
MONOCYTES NFR BLD: 5.1 % (ref 0–10)
NEUTROPHILS # BLD: 10.9 K/UL (ref 1.5–7.5)
NEUTS SEG NFR BLD: 71.7 % (ref 50–65)
PERFORMED ON: ABNORMAL
PERFORMED ON: NORMAL
PERFORMED ON: NORMAL
PLATELET # BLD AUTO: 228 K/UL (ref 130–400)
PLATELET SLIDE REVIEW: ADEQUATE
PMV BLD AUTO: 9.7 FL (ref 9.4–12.3)
POTASSIUM SERPL-SCNC: 4.4 MMOL/L (ref 3.5–5)
POTASSIUM SERPL-SCNC: 5.3 MMOL/L (ref 3.5–5)
RBC # BLD AUTO: 4.23 M/UL (ref 4.2–5.4)
SODIUM SERPL-SCNC: 137 MMOL/L (ref 136–145)
WBC # BLD AUTO: 15.1 K/UL (ref 4.8–10.8)

## 2024-05-21 PROCEDURE — 36415 COLL VENOUS BLD VENIPUNCTURE: CPT

## 2024-05-21 PROCEDURE — 84132 ASSAY OF SERUM POTASSIUM: CPT

## 2024-05-21 PROCEDURE — 6370000000 HC RX 637 (ALT 250 FOR IP): Performed by: INTERNAL MEDICINE

## 2024-05-21 PROCEDURE — 3700000001 HC ADD 15 MINUTES (ANESTHESIA): Performed by: INTERNAL MEDICINE

## 2024-05-21 PROCEDURE — 92960 CARDIOVERSION ELECTRIC EXT: CPT | Performed by: INTERNAL MEDICINE

## 2024-05-21 PROCEDURE — 2140000000 HC CCU INTERMEDIATE R&B

## 2024-05-21 PROCEDURE — 94760 N-INVAS EAR/PLS OXIMETRY 1: CPT

## 2024-05-21 PROCEDURE — 2500000003 HC RX 250 WO HCPCS: Performed by: NURSE ANESTHETIST, CERTIFIED REGISTERED

## 2024-05-21 PROCEDURE — 6360000002 HC RX W HCPCS: Performed by: NURSE ANESTHETIST, CERTIFIED REGISTERED

## 2024-05-21 PROCEDURE — 2700000000 HC OXYGEN THERAPY PER DAY

## 2024-05-21 PROCEDURE — 94640 AIRWAY INHALATION TREATMENT: CPT

## 2024-05-21 PROCEDURE — 93312 ECHO TRANSESOPHAGEAL: CPT | Performed by: INTERNAL MEDICINE

## 2024-05-21 PROCEDURE — 82962 GLUCOSE BLOOD TEST: CPT

## 2024-05-21 PROCEDURE — XW033H5 INTRODUCTION OF TOCILIZUMAB INTO PERIPHERAL VEIN, PERCUTANEOUS APPROACH, NEW TECHNOLOGY GROUP 5: ICD-10-PCS | Performed by: INTERNAL MEDICINE

## 2024-05-21 PROCEDURE — 6370000000 HC RX 637 (ALT 250 FOR IP): Performed by: STUDENT IN AN ORGANIZED HEALTH CARE EDUCATION/TRAINING PROGRAM

## 2024-05-21 PROCEDURE — 3700000000 HC ANESTHESIA ATTENDED CARE: Performed by: INTERNAL MEDICINE

## 2024-05-21 PROCEDURE — 5A2204Z RESTORATION OF CARDIAC RHYTHM, SINGLE: ICD-10-PCS | Performed by: INTERNAL MEDICINE

## 2024-05-21 PROCEDURE — 2580000003 HC RX 258: Performed by: NURSE ANESTHETIST, CERTIFIED REGISTERED

## 2024-05-21 PROCEDURE — 93312 ECHO TRANSESOPHAGEAL: CPT

## 2024-05-21 PROCEDURE — 85025 COMPLETE CBC W/AUTO DIFF WBC: CPT

## 2024-05-21 PROCEDURE — 6360000002 HC RX W HCPCS: Performed by: INTERNAL MEDICINE

## 2024-05-21 PROCEDURE — 80048 BASIC METABOLIC PNL TOTAL CA: CPT

## 2024-05-21 RX ORDER — SODIUM CHLORIDE 0.9 % (FLUSH) 0.9 %
5-40 SYRINGE (ML) INJECTION EVERY 12 HOURS SCHEDULED
Status: CANCELLED | OUTPATIENT
Start: 2024-05-21

## 2024-05-21 RX ORDER — MEPERIDINE HYDROCHLORIDE 25 MG/ML
12.5 INJECTION INTRAMUSCULAR; INTRAVENOUS; SUBCUTANEOUS EVERY 5 MIN PRN
Status: CANCELLED | OUTPATIENT
Start: 2024-05-21

## 2024-05-21 RX ORDER — HYDROMORPHONE HYDROCHLORIDE 1 MG/ML
0.25 INJECTION, SOLUTION INTRAMUSCULAR; INTRAVENOUS; SUBCUTANEOUS EVERY 5 MIN PRN
Status: CANCELLED | OUTPATIENT
Start: 2024-05-21

## 2024-05-21 RX ORDER — HYDROMORPHONE HYDROCHLORIDE 1 MG/ML
0.5 INJECTION, SOLUTION INTRAMUSCULAR; INTRAVENOUS; SUBCUTANEOUS EVERY 5 MIN PRN
Status: CANCELLED | OUTPATIENT
Start: 2024-05-21

## 2024-05-21 RX ORDER — NALOXONE HYDROCHLORIDE 0.4 MG/ML
INJECTION, SOLUTION INTRAMUSCULAR; INTRAVENOUS; SUBCUTANEOUS PRN
Status: CANCELLED | OUTPATIENT
Start: 2024-05-21

## 2024-05-21 RX ORDER — SODIUM CHLORIDE 9 MG/ML
INJECTION, SOLUTION INTRAVENOUS PRN
Status: CANCELLED | OUTPATIENT
Start: 2024-05-21

## 2024-05-21 RX ORDER — LIDOCAINE HYDROCHLORIDE 10 MG/ML
INJECTION, SOLUTION EPIDURAL; INFILTRATION; INTRACAUDAL; PERINEURAL PRN
Status: DISCONTINUED | OUTPATIENT
Start: 2024-05-21 | End: 2024-05-21 | Stop reason: SDUPTHER

## 2024-05-21 RX ORDER — INSULIN GLARGINE 100 [IU]/ML
10 INJECTION, SOLUTION SUBCUTANEOUS NIGHTLY
Status: DISCONTINUED | OUTPATIENT
Start: 2024-05-21 | End: 2024-05-25

## 2024-05-21 RX ORDER — METOCLOPRAMIDE HYDROCHLORIDE 5 MG/ML
10 INJECTION INTRAMUSCULAR; INTRAVENOUS
Status: CANCELLED | OUTPATIENT
Start: 2024-05-21 | End: 2024-05-22

## 2024-05-21 RX ORDER — SODIUM CHLORIDE 0.9 % (FLUSH) 0.9 %
5-40 SYRINGE (ML) INJECTION PRN
Status: CANCELLED | OUTPATIENT
Start: 2024-05-21

## 2024-05-21 RX ORDER — VASOPRESSIN 20 [USP'U]/ML
INJECTION, SOLUTION INTRAMUSCULAR; SUBCUTANEOUS PRN
Status: DISCONTINUED | OUTPATIENT
Start: 2024-05-21 | End: 2024-05-21 | Stop reason: SDUPTHER

## 2024-05-21 RX ORDER — DIPHENHYDRAMINE HYDROCHLORIDE 50 MG/ML
12.5 INJECTION INTRAMUSCULAR; INTRAVENOUS
Status: CANCELLED | OUTPATIENT
Start: 2024-05-21 | End: 2024-05-22

## 2024-05-21 RX ORDER — SODIUM CHLORIDE 9 MG/ML
INJECTION, SOLUTION INTRAVENOUS CONTINUOUS PRN
Status: DISCONTINUED | OUTPATIENT
Start: 2024-05-21 | End: 2024-05-21 | Stop reason: SDUPTHER

## 2024-05-21 RX ORDER — PROPOFOL 10 MG/ML
INJECTION, EMULSION INTRAVENOUS PRN
Status: DISCONTINUED | OUTPATIENT
Start: 2024-05-21 | End: 2024-05-21 | Stop reason: SDUPTHER

## 2024-05-21 RX ADMIN — BUDESONIDE AND FORMOTEROL FUMARATE DIHYDRATE 2 PUFF: 160; 4.5 AEROSOL RESPIRATORY (INHALATION) at 06:46

## 2024-05-21 RX ADMIN — SODIUM CHLORIDE: 9 INJECTION, SOLUTION INTRAVENOUS at 10:43

## 2024-05-21 RX ADMIN — IPRATROPIUM BROMIDE 2 PUFF: 17 AEROSOL, METERED RESPIRATORY (INHALATION) at 06:46

## 2024-05-21 RX ADMIN — LEVALBUTEROL HYDROCHLORIDE 0.63 MG: 0.63 SOLUTION RESPIRATORY (INHALATION) at 14:11

## 2024-05-21 RX ADMIN — LEVALBUTEROL HYDROCHLORIDE 0.63 MG: 0.63 SOLUTION RESPIRATORY (INHALATION) at 06:39

## 2024-05-21 RX ADMIN — DILTIAZEM HYDROCHLORIDE 180 MG: 180 CAPSULE, COATED, EXTENDED RELEASE ORAL at 08:45

## 2024-05-21 RX ADMIN — DIPHENHYDRAMINE HYDROCHLORIDE AND ZINC ACETATE: 10; 1 CREAM TOPICAL at 13:11

## 2024-05-21 RX ADMIN — DONEPEZIL HYDROCHLORIDE 10 MG: 10 TABLET, FILM COATED ORAL at 22:41

## 2024-05-21 RX ADMIN — BUDESONIDE AND FORMOTEROL FUMARATE DIHYDRATE 2 PUFF: 160; 4.5 AEROSOL RESPIRATORY (INHALATION) at 18:53

## 2024-05-21 RX ADMIN — INSULIN GLARGINE 10 UNITS: 100 INJECTION, SOLUTION SUBCUTANEOUS at 22:46

## 2024-05-21 RX ADMIN — DIPHENHYDRAMINE HYDROCHLORIDE AND ZINC ACETATE: 10; 1 CREAM TOPICAL at 22:44

## 2024-05-21 RX ADMIN — ACETAMINOPHEN 650 MG: 325 TABLET ORAL at 22:42

## 2024-05-21 RX ADMIN — APIXABAN 5 MG: 5 TABLET, FILM COATED ORAL at 22:41

## 2024-05-21 RX ADMIN — MICONAZOLE NITRATE: 2 POWDER TOPICAL at 08:46

## 2024-05-21 RX ADMIN — PROPOFOL 50 MG: 10 INJECTION, EMULSION INTRAVENOUS at 10:44

## 2024-05-21 RX ADMIN — DIPHENHYDRAMINE HYDROCHLORIDE AND ZINC ACETATE: 10; 1 CREAM TOPICAL at 08:46

## 2024-05-21 RX ADMIN — LEVALBUTEROL HYDROCHLORIDE 0.63 MG: 0.63 SOLUTION RESPIRATORY (INHALATION) at 20:02

## 2024-05-21 RX ADMIN — IPRATROPIUM BROMIDE 2 PUFF: 17 AEROSOL, METERED RESPIRATORY (INHALATION) at 18:53

## 2024-05-21 RX ADMIN — SODIUM ZIRCONIUM CYCLOSILICATE 5 G: 5 POWDER, FOR SUSPENSION ORAL at 03:22

## 2024-05-21 RX ADMIN — VASOPRESSIN 2 UNITS: 20 INJECTION INTRAVENOUS at 10:43

## 2024-05-21 RX ADMIN — ACETAMINOPHEN 650 MG: 325 TABLET ORAL at 17:43

## 2024-05-21 RX ADMIN — Medication 15 G: at 11:38

## 2024-05-21 RX ADMIN — VASOPRESSIN 2 UNITS: 20 INJECTION INTRAVENOUS at 10:50

## 2024-05-21 RX ADMIN — DIPHENHYDRAMINE HYDROCHLORIDE AND ZINC ACETATE: 10; 1 CREAM TOPICAL at 16:13

## 2024-05-21 RX ADMIN — Medication 5000 UNITS: at 08:45

## 2024-05-21 RX ADMIN — METOPROLOL SUCCINATE 50 MG: 50 TABLET, EXTENDED RELEASE ORAL at 08:45

## 2024-05-21 RX ADMIN — MICONAZOLE NITRATE: 2 POWDER TOPICAL at 22:44

## 2024-05-21 RX ADMIN — IPRATROPIUM BROMIDE 2 PUFF: 17 AEROSOL, METERED RESPIRATORY (INHALATION) at 14:18

## 2024-05-21 RX ADMIN — ATORVASTATIN CALCIUM 20 MG: 20 TABLET, FILM COATED ORAL at 08:45

## 2024-05-21 RX ADMIN — APIXABAN 5 MG: 5 TABLET, FILM COATED ORAL at 11:38

## 2024-05-21 RX ADMIN — LIDOCAINE HYDROCHLORIDE 100 MG: 10 INJECTION, SOLUTION EPIDURAL; INFILTRATION; INTRACAUDAL; PERINEURAL at 10:43

## 2024-05-21 ASSESSMENT — PAIN - FUNCTIONAL ASSESSMENT: PAIN_FUNCTIONAL_ASSESSMENT: ACTIVITIES ARE NOT PREVENTED

## 2024-05-21 ASSESSMENT — PAIN DESCRIPTION - DESCRIPTORS
DESCRIPTORS: OTHER (COMMENT)
DESCRIPTORS: DISCOMFORT

## 2024-05-21 ASSESSMENT — PAIN DESCRIPTION - ORIENTATION: ORIENTATION: MID

## 2024-05-21 ASSESSMENT — PAIN DESCRIPTION - LOCATION
LOCATION: CHEST
LOCATION: CHEST

## 2024-05-21 ASSESSMENT — PAIN SCALES - GENERAL
PAINLEVEL_OUTOF10: 10
PAINLEVEL_OUTOF10: 3

## 2024-05-21 NOTE — ANESTHESIA POSTPROCEDURE EVALUATION
Department of Anesthesiology  Postprocedure Note    Patient: Edie Cat  MRN: [id number]  YOB: 1945  Date of evaluation: 5/21/2024    Procedure Summary       Date: 05/21/24 Room / Location: [facility]; [facility] Non-Invasive Cardiology    Anesthesia Start: 1028 Anesthesia Stop: 1112    Procedure: SAMANTHA W/ POSSIBLE CARDIOVERSION (PRN CONTRAST/BUBBLE/3D) Diagnosis: Persistent atrial fibrillation (HCC)    Scheduled Providers: Cruz Perkins MD Responsible Provider: Jewel Elias APRN - CRNA    Anesthesia Type: MAC ASA Status: 3            Anesthesia Type: No value filed.    Kelsey Phase I:      Kelsey Phase II:      Anesthesia Post Evaluation    Patient location during evaluation: bedside  Patient participation: complete - patient participated  Level of consciousness: awake  Pain score: 0  Airway patency: patent  Nausea & Vomiting: no nausea and no vomiting  Cardiovascular status: blood pressure returned to baseline  Respiratory status: acceptable  Hydration status: stable  Pain management: adequate    No notable events documented.

## 2024-05-21 NOTE — PROCEDURES
PROCEDURE NOTE  Date: 5/21/2024   Name: Edie Cat  YOB: 1945    Procedures    Date of Procedure:  5/21/2024     Indications: Persistent atrial flutter with SAMANTHA showing no significant thrombus, initiated on anticoagulation.    Conscious Sedation Protocol Used During this Procedure -anesthesia (service        Anesthesia: Moderate   Sedation: 0 mg Midazolam (Versed)  0 mcg Fentanyl   Start time: 10:50 AM   Stop time: 11:07 AM   ASA Class: 3   EBL Not applicable     A trained medical personnel administered medications at my direction.  The patient was monitored continuously with ECG, pulse oximetry, blood pressure monitoring and direct observation.       After obtaining informed written consent and an appropriate level of conscious sedation, DCCV with 360 J was successful in restoring sinus rhythm.      Complications:  none      Impression:  Successful SAMANTHA/DC cardioversion of atrial flutter to normal sinus rhythm on Eliquis used for anticoagulation.  On amiodarone.    Electronically signed by Cruz Perkins MD on 5/21/24

## 2024-05-21 NOTE — PROGRESS NOTES
Arrived to cath holding to await a Miguel/ Cardioversion.  Iv site to right antecubital area clear, IV fluids of NS hung to infuse at 50 HR. A fib on monitor.

## 2024-05-21 NOTE — ANESTHESIA PRE PROCEDURE
05/21/2024 01:40 AM    CREATININE 1.4 05/21/2024 01:40 AM    CREATININE 1.1 08/22/2011 04:33 AM    LABGLOM 38 05/21/2024 01:40 AM    GLUCOSE 117 05/21/2024 01:40 AM    GLUCOSE 129 08/09/2011 10:52 AM    PROT 6.2 08/16/2011 04:46 AM    CALCIUM 8.5 05/21/2024 01:40 AM    BILITOT 0.3 05/16/2024 01:36 AM    ALKPHOS 104 05/16/2024 01:36 AM    ALKPHOS 75 08/16/2011 04:46 AM    AST 8 05/16/2024 01:36 AM    ALT 19 05/16/2024 01:36 AM       POC Tests:   Recent Labs     05/21/24  1000   POCGLU 63*       Coags:   Lab Results   Component Value Date/Time    PROTIME 24.6 05/09/2024 04:17 AM    PROTIME 28.76 08/23/2011 04:02 AM    INR 2.28 05/09/2024 04:17 AM    APTT 19.60 08/09/2011 10:09 AM       HCG (If Applicable): No results found for: \"PREGTESTUR\", \"PREGSERUM\", \"HCG\", \"HCGQUANT\"     ABGs:   Lab Results   Component Value Date/Time    L1AHNDEF 93 08/09/2011 11:11 AM        Type & Screen (If Applicable):  No results found for: \"LABABO\"    Drug/Infectious Status (If Applicable):  No results found for: \"HIV\", \"HEPCAB\"    COVID-19 Screening (If Applicable):   Lab Results   Component Value Date/Time    COVID19 Not Detected 05/20/2024 05:36 PM           Anesthesia Evaluation  Patient summary reviewed and Nursing notes reviewed  Airway: Mallampati: II  TM distance: >3 FB     Mouth opening: > = 3 FB   Dental:          Pulmonary: breath sounds clear to auscultation  (+)  COPD:                                     Cardiovascular:  Exercise tolerance: poor (<4 METS)  (+) hypertension:, CAD:        Rhythm: irregular  Rate: normal                    Neuro/Psych:   (+) CVA:, TIA, psychiatric history:            GI/Hepatic/Renal: Neg GI/Hepatic/Renal ROS            Endo/Other: Negative Endo/Other ROS   (+) Diabetes.                 Abdominal:       Abdomen: soft.      Vascular:          Other Findings:       Anesthesia Plan      MAC     ASA 3       Induction: intravenous.      Anesthetic plan and risks discussed with patient.      Plan

## 2024-05-21 NOTE — PROCEDURES
PROCEDURE NOTE  Date: 5/21/2024   Name: Edie Cat  YOB: 1945    Procedures    Transesophageal echocardiogram preliminary note:    Spontaneous echo contrast with no thrombus in atrium more noted in right atrium.  Left atrial appendage free of thrombus.  Mild aortic stenosis.  EF 45%.  Small slitlike PFO without significant right-to-left shunting appreciated.    Plan: Proceed with DCCV.

## 2024-05-21 NOTE — PLAN OF CARE
Problem: Discharge Planning  Goal: Discharge to home or other facility with appropriate resources  5/21/2024 0919 by Tila Jordan RN  Outcome: Progressing  5/21/2024 0429 by Anna Atkinson RN  Outcome: Progressing     Problem: Safety - Adult  Goal: Free from fall injury  5/21/2024 0919 by Tila Jordan RN  Outcome: Progressing  5/21/2024 0429 by Anna Atkinson RN  Outcome: Progressing     Problem: ABCDS Injury Assessment  Goal: Absence of physical injury  5/21/2024 0919 by Tila Jordan RN  Outcome: Progressing  5/21/2024 0429 by Anna Atkinson RN  Outcome: Progressing     Problem: Skin/Tissue Integrity  Goal: Absence of new skin breakdown  Description:  Monitor for areas of redness and/or skin breakdown    5/21/2024 0919 by Tila Jordan RN  Outcome: Progressing  5/21/2024 0429 by Anna Atkinson RN  Outcome: Progressing     Problem: Pain  Goal: Verbalizes/displays adequate comfort level or baseline comfort level  5/21/2024 0919 by Tila Jordan RN  Outcome: Progressing  5/21/2024 0429 by Anna Atkinson RN  Outcome: Progressing     Problem: Chronic Conditions and Co-morbidities  Goal: Patient's chronic conditions and co-morbidity symptoms are monitored and maintained or improved  5/21/2024 0919 by Tila Jordan RN  Outcome: Progressing  5/21/2024 0429 by Anna Atkinson RN  Outcome: Progressing     Problem: Neurosensory - Adult  Goal: Achieves stable or improved neurological status  5/21/2024 0919 by Tila Jordan RN  Outcome: Progressing  5/21/2024 0429 by Anna Atkinson RN  Outcome: Progressing     Problem: Respiratory - Adult  Goal: Achieves optimal ventilation and oxygenation  5/21/2024 0919 by Tila Jordan RN  Outcome: Progressing  5/21/2024 0429 by Anna Atkinson RN  Outcome: Progressing     Problem: Cardiovascular - Adult  Goal: Maintains optimal cardiac output and hemodynamic stability  5/21/2024 0919 by Tila Jordan RN  Outcome: Progressing  5/21/2024 0429 by Anna Atkinson  RN  Outcome: Progressing     Problem: Skin/Tissue Integrity - Adult  Goal: Skin integrity remains intact  5/21/2024 0919 by Tila Jordan RN  Outcome: Progressing  5/21/2024 0429 by Anna Atkinson RN  Outcome: Progressing     Problem: Musculoskeletal - Adult  Goal: Return mobility to safest level of function  5/21/2024 0919 by Tila Jordan RN  Outcome: Progressing  5/21/2024 0429 by Anna Atkinson RN  Outcome: Progressing     Problem: Gastrointestinal - Adult  Goal: Minimal or absence of nausea and vomiting  5/21/2024 0919 by Tila Jordan RN  Outcome: Progressing  5/21/2024 0429 by Anna Atkinson RN  Outcome: Progressing     Problem: Genitourinary - Adult  Goal: Absence of urinary retention  5/21/2024 0919 by Tila Jordan RN  Outcome: Progressing  5/21/2024 0429 by Anna Atkinson RN  Outcome: Progressing     Problem: Infection - Adult  Goal: Absence of infection at discharge  5/21/2024 0919 by Tial Jordan RN  Outcome: Progressing  5/21/2024 0429 by Anna Atkinson RN  Outcome: Progressing     Problem: Metabolic/Fluid and Electrolytes - Adult  Goal: Electrolytes maintained within normal limits  5/21/2024 0919 by Tila Jordan RN  Outcome: Progressing  5/21/2024 0429 by Anna Atkinson RN  Outcome: Progressing     Problem: Anxiety  Goal: Will report anxiety at manageable levels  Description: INTERVENTIONS:  1. Administer medication as ordered  2. Teach and rehearse alternative coping skills  3. Provide emotional support with 1:1 interaction with staff  5/21/2024 0919 by Tila Jordan RN  Outcome: Progressing  5/21/2024 0429 by Anna Atkinson RN  Outcome: Progressing     Problem: Coping  Goal: Pt/Family able to verbalize concerns and demonstrate effective coping strategies  Description: INTERVENTIONS:  1. Assist patient/family to identify coping skills, available support systems and cultural and spiritual values  2. Provide emotional support, including active listening and acknowledgement of

## 2024-05-21 NOTE — PROGRESS NOTES
Occupational Therapy  Pt just getting back from a cardiac invert appt. Will continue to follow. Electronically signed by SLAVA Junior on 5/21/2024 at 1:19 PM

## 2024-05-21 NOTE — PROGRESS NOTES
Now off steroids  -- Decrease Lantus to 20 units QHS  -- Decrease SSI to low      DVT prophylaxis: Eliquis    DISPOSITION:  Plan for SNF at discharge, possibly tomorrow if remains in sinus rhythm and ok with cardiology.    Full Code No additional code details         After evaluating the complexity of problems addressed and management options selected today, I believe the patient's risk of complications and/or morbidity or mortality to be moderate.      Sincere Núñez MD 5/21/2024 5:03 PM

## 2024-05-21 NOTE — PROGRESS NOTES
Comprehensive Nutrition Assessment    Type and Reason for Visit:  Reassess    Nutrition Recommendations/Plan:   Continue POC.     Malnutrition Assessment:  Malnutrition Status:  At risk for malnutrition (Comment) (05/21/24 6166)    Context:  Acute Illness     Findings of the 6 clinical characteristics of malnutrition:  Energy Intake:  No significant decrease in energy intake  Weight Loss:  No significant weight loss     Body Fat Loss:  No significant body fat loss     Muscle Mass Loss:  No significant muscle mass loss    Fluid Accumulation:  Mild Extremities, Generalized   Strength:  Not Performed    Nutrition Assessment:    Pt is s/p SAMANTHA/DCCV today. Observed 26-50% of lunch consumed. Documented intake adequate, averaging %; some meals 1-75%. Aware current wt of 139.5# likely inaccurate as pt has been trending around 200#. Have requested new wt. Will use 5/19 wt in calculations. Will continue to monitor.    Nutrition Related Findings:    BM 5/20. +1 generalized/BLE edema. K+ 4.7, 4.4, glu , accuchek's .         Current Nutrition Intake & Therapies:    Average Meal Intake: %, 1-25%, 26-50%, 51-75%  Average Supplements Intake: None Ordered  ADULT DIET; Regular; 4 carb choices (60 gm/meal); Low Potassium (Less than 3000 mg/day); 1000 ml    Anthropometric Measures:  Height: 157.5 cm (5' 2.01\")  Ideal Body Weight (IBW): 110 lbs (50 kg)    Current Body Weight: 94.8 kg (208 lb 15.9 oz), 190 % IBW.    Current BMI (kg/m2): 38.2  Usual Body Weight: 82.6 kg (182 lb 1.6 oz) (7/3/23)  % Weight Change (Calculated): 14.8  BMI Categories: Obese Class 2 (BMI 35.0 -39.9)    Estimated Daily Nutrient Needs:  Energy Requirements Based On: Kcal/kg  Weight Used for Energy Requirements: Current  Energy (kcal/day): 8532-6101 (15-18 kcal/kg)  Weight Used for Protein Requirements: Ideal  Protein (g/day):  (1.2-2 g/kg)  Method Used for Fluid Requirements: 1 ml/kcal  Fluid (ml/day): 1221-0626    Nutrition

## 2024-05-22 PROBLEM — R21 RASH IN ADULT: Status: ACTIVE | Noted: 2024-05-22

## 2024-05-22 PROBLEM — Z51.5 PALLIATIVE CARE PATIENT: Status: ACTIVE | Noted: 2024-05-22

## 2024-05-22 LAB
ANION GAP SERPL CALCULATED.3IONS-SCNC: 12 MMOL/L (ref 7–19)
BACTERIA UR CULT: ABNORMAL
BACTERIA UR CULT: ABNORMAL
BASOPHILS # BLD: 0.1 K/UL (ref 0–0.2)
BASOPHILS NFR BLD: 0.4 % (ref 0–1)
BUN SERPL-MCNC: 56 MG/DL (ref 8–23)
CALCIUM SERPL-MCNC: 8.5 MG/DL (ref 8.8–10.2)
CHLORIDE SERPL-SCNC: 97 MMOL/L (ref 98–111)
CO2 SERPL-SCNC: 27 MMOL/L (ref 22–29)
CREAT SERPL-MCNC: 1.2 MG/DL (ref 0.5–0.9)
EOSINOPHIL # BLD: 0.6 K/UL (ref 0–0.6)
EOSINOPHIL NFR BLD: 4.4 % (ref 0–5)
ERYTHROCYTE [DISTWIDTH] IN BLOOD BY AUTOMATED COUNT: 15.4 % (ref 11.5–14.5)
GLUCOSE BLD-MCNC: 231 MG/DL (ref 70–99)
GLUCOSE BLD-MCNC: 234 MG/DL (ref 70–99)
GLUCOSE BLD-MCNC: 267 MG/DL (ref 70–99)
GLUCOSE BLD-MCNC: 92 MG/DL (ref 70–99)
GLUCOSE BLD-MCNC: 97 MG/DL (ref 70–99)
GLUCOSE SERPL-MCNC: 84 MG/DL (ref 74–109)
HCT VFR BLD AUTO: 36.8 % (ref 37–47)
HGB BLD-MCNC: 10.7 G/DL (ref 12–16)
IMM GRANULOCYTES # BLD: 0.1 K/UL
LYMPHOCYTES # BLD: 1.1 K/UL (ref 1.1–4.5)
LYMPHOCYTES NFR BLD: 8 % (ref 20–40)
MCH RBC QN AUTO: 24.8 PG (ref 27–31)
MCHC RBC AUTO-ENTMCNC: 29.1 G/DL (ref 33–37)
MCV RBC AUTO: 85.4 FL (ref 81–99)
MONOCYTES # BLD: 0.7 K/UL (ref 0–0.9)
MONOCYTES NFR BLD: 5.3 % (ref 0–10)
NEUTROPHILS # BLD: 11.1 K/UL (ref 1.5–7.5)
NEUTS SEG NFR BLD: 81 % (ref 50–65)
ORGANISM: ABNORMAL
PERFORMED ON: ABNORMAL
PERFORMED ON: NORMAL
PERFORMED ON: NORMAL
PLATELET # BLD AUTO: 219 K/UL (ref 130–400)
PMV BLD AUTO: 9.6 FL (ref 9.4–12.3)
POTASSIUM SERPL-SCNC: 5 MMOL/L (ref 3.5–5)
RBC # BLD AUTO: 4.31 M/UL (ref 4.2–5.4)
SODIUM SERPL-SCNC: 136 MMOL/L (ref 136–145)
TROPONIN, HIGH SENSITIVITY: 31 NG/L (ref 0–14)
TROPONIN, HIGH SENSITIVITY: 42 NG/L (ref 0–14)
WBC # BLD AUTO: 13.7 K/UL (ref 4.8–10.8)

## 2024-05-22 PROCEDURE — 99223 1ST HOSP IP/OBS HIGH 75: CPT | Performed by: PHYSICIAN ASSISTANT

## 2024-05-22 PROCEDURE — 85025 COMPLETE CBC W/AUTO DIFF WBC: CPT

## 2024-05-22 PROCEDURE — 6360000002 HC RX W HCPCS: Performed by: INTERNAL MEDICINE

## 2024-05-22 PROCEDURE — 2580000003 HC RX 258: Performed by: NURSE ANESTHETIST, CERTIFIED REGISTERED

## 2024-05-22 PROCEDURE — 94760 N-INVAS EAR/PLS OXIMETRY 1: CPT

## 2024-05-22 PROCEDURE — 6370000000 HC RX 637 (ALT 250 FOR IP): Performed by: STUDENT IN AN ORGANIZED HEALTH CARE EDUCATION/TRAINING PROGRAM

## 2024-05-22 PROCEDURE — 99232 SBSQ HOSP IP/OBS MODERATE 35: CPT | Performed by: INTERNAL MEDICINE

## 2024-05-22 PROCEDURE — 2700000000 HC OXYGEN THERAPY PER DAY

## 2024-05-22 PROCEDURE — 6370000000 HC RX 637 (ALT 250 FOR IP): Performed by: INTERNAL MEDICINE

## 2024-05-22 PROCEDURE — 80048 BASIC METABOLIC PNL TOTAL CA: CPT

## 2024-05-22 PROCEDURE — 36415 COLL VENOUS BLD VENIPUNCTURE: CPT

## 2024-05-22 PROCEDURE — 2500000003 HC RX 250 WO HCPCS: Performed by: INTERNAL MEDICINE

## 2024-05-22 PROCEDURE — 2140000000 HC CCU INTERMEDIATE R&B

## 2024-05-22 PROCEDURE — 84484 ASSAY OF TROPONIN QUANT: CPT

## 2024-05-22 PROCEDURE — 94640 AIRWAY INHALATION TREATMENT: CPT

## 2024-05-22 PROCEDURE — 6370000000 HC RX 637 (ALT 250 FOR IP)

## 2024-05-22 PROCEDURE — 6370000000 HC RX 637 (ALT 250 FOR IP): Performed by: PHYSICIAN ASSISTANT

## 2024-05-22 PROCEDURE — 94150 VITAL CAPACITY TEST: CPT

## 2024-05-22 PROCEDURE — 2580000003 HC RX 258: Performed by: INTERNAL MEDICINE

## 2024-05-22 PROCEDURE — 6360000002 HC RX W HCPCS: Performed by: PHYSICIAN ASSISTANT

## 2024-05-22 PROCEDURE — 82962 GLUCOSE BLOOD TEST: CPT

## 2024-05-22 RX ORDER — HYDROMORPHONE HYDROCHLORIDE 1 MG/ML
0.25 INJECTION, SOLUTION INTRAMUSCULAR; INTRAVENOUS; SUBCUTANEOUS EVERY 5 MIN PRN
Status: DISCONTINUED | OUTPATIENT
Start: 2024-05-22 | End: 2024-05-22

## 2024-05-22 RX ORDER — NALOXONE HYDROCHLORIDE 0.4 MG/ML
INJECTION, SOLUTION INTRAMUSCULAR; INTRAVENOUS; SUBCUTANEOUS PRN
Status: DISCONTINUED | OUTPATIENT
Start: 2024-05-22 | End: 2024-05-31 | Stop reason: HOSPADM

## 2024-05-22 RX ORDER — DIGOXIN 0.25 MG/ML
250 INJECTION INTRAMUSCULAR; INTRAVENOUS EVERY 6 HOURS SCHEDULED
Status: COMPLETED | OUTPATIENT
Start: 2024-05-22 | End: 2024-05-23

## 2024-05-22 RX ORDER — BENZOCAINE/MENTHOL 6 MG-10 MG
LOZENGE MUCOUS MEMBRANE 2 TIMES DAILY
Status: DISCONTINUED | OUTPATIENT
Start: 2024-05-22 | End: 2024-05-29

## 2024-05-22 RX ORDER — NITROGLYCERIN 0.4 MG/1
0.4 TABLET SUBLINGUAL EVERY 5 MIN PRN
Status: DISCONTINUED | OUTPATIENT
Start: 2024-05-22 | End: 2024-05-31 | Stop reason: HOSPADM

## 2024-05-22 RX ORDER — NITROGLYCERIN 0.4 MG/1
TABLET SUBLINGUAL
Status: COMPLETED
Start: 2024-05-22 | End: 2024-05-22

## 2024-05-22 RX ORDER — HYDROMORPHONE HYDROCHLORIDE 1 MG/ML
0.5 INJECTION, SOLUTION INTRAMUSCULAR; INTRAVENOUS; SUBCUTANEOUS EVERY 5 MIN PRN
Status: DISCONTINUED | OUTPATIENT
Start: 2024-05-22 | End: 2024-05-22

## 2024-05-22 RX ORDER — LEVOFLOXACIN 250 MG/1
250 TABLET, FILM COATED ORAL DAILY
Status: DISCONTINUED | OUTPATIENT
Start: 2024-05-22 | End: 2024-05-22

## 2024-05-22 RX ORDER — SODIUM CHLORIDE 0.9 % (FLUSH) 0.9 %
5-40 SYRINGE (ML) INJECTION EVERY 12 HOURS SCHEDULED
Status: DISCONTINUED | OUTPATIENT
Start: 2024-05-22 | End: 2024-05-29

## 2024-05-22 RX ORDER — SODIUM CHLORIDE 9 MG/ML
INJECTION, SOLUTION INTRAVENOUS PRN
Status: DISCONTINUED | OUTPATIENT
Start: 2024-05-22 | End: 2024-05-31 | Stop reason: HOSPADM

## 2024-05-22 RX ORDER — DILTIAZEM HYDROCHLORIDE 180 MG/1
180 CAPSULE, COATED, EXTENDED RELEASE ORAL 2 TIMES DAILY
Status: DISCONTINUED | OUTPATIENT
Start: 2024-05-22 | End: 2024-05-22

## 2024-05-22 RX ORDER — METOCLOPRAMIDE HYDROCHLORIDE 5 MG/ML
10 INJECTION INTRAMUSCULAR; INTRAVENOUS
Status: ACTIVE | OUTPATIENT
Start: 2024-05-22 | End: 2024-05-23

## 2024-05-22 RX ORDER — DIGOXIN 250 MCG
250 TABLET ORAL DAILY
Status: DISCONTINUED | OUTPATIENT
Start: 2024-05-23 | End: 2024-05-28

## 2024-05-22 RX ORDER — DIPHENHYDRAMINE HYDROCHLORIDE 50 MG/ML
12.5 INJECTION INTRAMUSCULAR; INTRAVENOUS
Status: DISCONTINUED | OUTPATIENT
Start: 2024-05-22 | End: 2024-05-22

## 2024-05-22 RX ORDER — MEPERIDINE HYDROCHLORIDE 25 MG/ML
12.5 INJECTION INTRAMUSCULAR; INTRAVENOUS; SUBCUTANEOUS EVERY 5 MIN PRN
Status: DISCONTINUED | OUTPATIENT
Start: 2024-05-22 | End: 2024-05-22

## 2024-05-22 RX ORDER — SODIUM CHLORIDE 0.9 % (FLUSH) 0.9 %
5-40 SYRINGE (ML) INJECTION PRN
Status: DISCONTINUED | OUTPATIENT
Start: 2024-05-22 | End: 2024-05-31 | Stop reason: HOSPADM

## 2024-05-22 RX ORDER — HYDROXYZINE HYDROCHLORIDE 10 MG/1
10 TABLET, FILM COATED ORAL 3 TIMES DAILY PRN
Status: DISCONTINUED | OUTPATIENT
Start: 2024-05-22 | End: 2024-05-24

## 2024-05-22 RX ADMIN — SODIUM CHLORIDE, PRESERVATIVE FREE 10 ML: 5 INJECTION INTRAVENOUS at 22:15

## 2024-05-22 RX ADMIN — DIPHENHYDRAMINE HYDROCHLORIDE AND ZINC ACETATE: 10; 1 CREAM TOPICAL at 16:32

## 2024-05-22 RX ADMIN — NITROGLYCERIN 0.4 MG: 0.4 TABLET, ORALLY DISINTEGRATING SUBLINGUAL at 01:29

## 2024-05-22 RX ADMIN — MICONAZOLE NITRATE: 2 POWDER TOPICAL at 22:15

## 2024-05-22 RX ADMIN — Medication 5000 UNITS: at 09:13

## 2024-05-22 RX ADMIN — LEVALBUTEROL HYDROCHLORIDE 0.63 MG: 0.63 SOLUTION RESPIRATORY (INHALATION) at 20:58

## 2024-05-22 RX ADMIN — APIXABAN 5 MG: 5 TABLET, FILM COATED ORAL at 22:14

## 2024-05-22 RX ADMIN — DILTIAZEM HYDROCHLORIDE 180 MG: 180 CAPSULE, COATED, EXTENDED RELEASE ORAL at 09:13

## 2024-05-22 RX ADMIN — DIPHENHYDRAMINE HYDROCHLORIDE AND ZINC ACETATE: 10; 1 CREAM TOPICAL at 22:14

## 2024-05-22 RX ADMIN — APIXABAN 5 MG: 5 TABLET, FILM COATED ORAL at 09:13

## 2024-05-22 RX ADMIN — DILTIAZEM HYDROCHLORIDE 5 MG/HR: 5 INJECTION, SOLUTION INTRAVENOUS at 01:47

## 2024-05-22 RX ADMIN — IPRATROPIUM BROMIDE 2 PUFF: 17 AEROSOL, METERED RESPIRATORY (INHALATION) at 09:55

## 2024-05-22 RX ADMIN — HYDROCORTISONE ACETATE: 1 CREAM TOPICAL at 22:14

## 2024-05-22 RX ADMIN — DIGOXIN 250 MCG: 0.25 INJECTION INTRAMUSCULAR; INTRAVENOUS at 16:29

## 2024-05-22 RX ADMIN — INSULIN GLARGINE 10 UNITS: 100 INJECTION, SOLUTION SUBCUTANEOUS at 22:14

## 2024-05-22 RX ADMIN — INSULIN LISPRO 2 UNITS: 100 INJECTION, SOLUTION INTRAVENOUS; SUBCUTANEOUS at 16:30

## 2024-05-22 RX ADMIN — NITROGLYCERIN 0.4 MG: 0.4 TABLET, ORALLY DISINTEGRATING SUBLINGUAL at 05:31

## 2024-05-22 RX ADMIN — IPRATROPIUM BROMIDE 2 PUFF: 17 AEROSOL, METERED RESPIRATORY (INHALATION) at 14:10

## 2024-05-22 RX ADMIN — SODIUM CHLORIDE, PRESERVATIVE FREE 10 ML: 5 INJECTION INTRAVENOUS at 09:15

## 2024-05-22 RX ADMIN — ATORVASTATIN CALCIUM 20 MG: 20 TABLET, FILM COATED ORAL at 09:13

## 2024-05-22 RX ADMIN — METOPROLOL SUCCINATE 50 MG: 50 TABLET, EXTENDED RELEASE ORAL at 09:13

## 2024-05-22 RX ADMIN — Medication 15 G: at 09:14

## 2024-05-22 RX ADMIN — IPRATROPIUM BROMIDE 2 PUFF: 17 AEROSOL, METERED RESPIRATORY (INHALATION) at 19:12

## 2024-05-22 RX ADMIN — LEVALBUTEROL HYDROCHLORIDE 0.63 MG: 0.63 SOLUTION RESPIRATORY (INHALATION) at 14:01

## 2024-05-22 RX ADMIN — INSULIN LISPRO 1 UNITS: 100 INJECTION, SOLUTION INTRAVENOUS; SUBCUTANEOUS at 11:20

## 2024-05-22 RX ADMIN — NITROGLYCERIN 0.4 MG: 0.4 TABLET, ORALLY DISINTEGRATING SUBLINGUAL at 05:41

## 2024-05-22 RX ADMIN — IPRATROPIUM BROMIDE 2 PUFF: 17 AEROSOL, METERED RESPIRATORY (INHALATION) at 06:17

## 2024-05-22 RX ADMIN — DONEPEZIL HYDROCHLORIDE 10 MG: 10 TABLET, FILM COATED ORAL at 22:13

## 2024-05-22 RX ADMIN — LEVALBUTEROL HYDROCHLORIDE 0.63 MG: 0.63 SOLUTION RESPIRATORY (INHALATION) at 06:10

## 2024-05-22 RX ADMIN — DIPHENHYDRAMINE HYDROCHLORIDE AND ZINC ACETATE: 10; 1 CREAM TOPICAL at 11:21

## 2024-05-22 RX ADMIN — DIPHENHYDRAMINE HYDROCHLORIDE AND ZINC ACETATE: 10; 1 CREAM TOPICAL at 09:15

## 2024-05-22 RX ADMIN — BUDESONIDE AND FORMOTEROL FUMARATE DIHYDRATE 2 PUFF: 160; 4.5 AEROSOL RESPIRATORY (INHALATION) at 19:12

## 2024-05-22 RX ADMIN — LEVOFLOXACIN 250 MG: 250 TABLET, FILM COATED ORAL at 09:18

## 2024-05-22 RX ADMIN — HYDROXYZINE HYDROCHLORIDE 10 MG: 10 TABLET ORAL at 22:14

## 2024-05-22 RX ADMIN — HYDROXYZINE HYDROCHLORIDE 10 MG: 10 TABLET ORAL at 15:13

## 2024-05-22 RX ADMIN — BUDESONIDE AND FORMOTEROL FUMARATE DIHYDRATE 2 PUFF: 160; 4.5 AEROSOL RESPIRATORY (INHALATION) at 06:17

## 2024-05-22 RX ADMIN — MICONAZOLE NITRATE: 2 POWDER TOPICAL at 09:15

## 2024-05-22 RX ADMIN — NITROGLYCERIN 0.4 MG: 0.4 TABLET, ORALLY DISINTEGRATING SUBLINGUAL at 05:36

## 2024-05-22 ASSESSMENT — PAIN SCALES - GENERAL
PAINLEVEL_OUTOF10: 10
PAINLEVEL_OUTOF10: 9
PAINLEVEL_OUTOF10: 10
PAINLEVEL_OUTOF10: 10

## 2024-05-22 ASSESSMENT — PAIN DESCRIPTION - LOCATION
LOCATION: CHEST

## 2024-05-22 ASSESSMENT — PAIN DESCRIPTION - ORIENTATION
ORIENTATION: MID

## 2024-05-22 ASSESSMENT — PAIN DESCRIPTION - DESCRIPTORS: DESCRIPTORS: SHARP

## 2024-05-22 NOTE — PROGRESS NOTES
Date:2024  Patient: Edie Cat  : 1945  MRN:[id number]  CODE:                                                                        PCP:Dudley Eduardo    Admit Date: 2024  7:17 PM   LOS: 20 days     Hospital course :  Ms. Wells was admitted for COVID 19 infection, mental status change, and hyponatremia.    She was initially admitted to CCU.  She had minimal symptoms regarding her COVID and this cleared quickly.  Her sodium remained low in spite of maximum medical management.  On , she was dosed with Samsca and had improvement in her sodium.   On  the sodium improved to 133 and at the time of discharge back to SNF the patient developed shortness of breath and started treatment for COPD so the discharge was held.  Still requiring oxygen by 2 L more likely from atelectasis due to deconditioning could be from the COVID.  Presently on urea salt with a dip in sodium to 122 and recover back to 136.   Treated for hyperkalemia on   Developed A-fib with RVR consulted cardiology for further evaluation, medication adjusted during this admission with Coumadin changed to Eliquis for ease of anticoagulation. On  developed severe tachycardia found to have A-fib with RVR consulted cardiology and transferred to PCU for further care. EP cardiologist on board , got a DC cardioversion on .   Suspected constipation with some intermittent nausea and vomiting and treated for the same to ensure bowel movement regularly         Subjective:   yesterday the patient got DCCV back to sinus rhythm, overnight the patient has chest pain and again reverted back to A-fib with RVR.  Today seen to be in the bed very weak and tired, with urine culture growing Proteus patient denied any kind of lower abdominal pain, increased frequency, no burning will discontinue antibiotics and follow-up with cardiology for further management.  With ongoing comorbid condition    Review of Systems    Reviewed 12 system and

## 2024-05-22 NOTE — PROGRESS NOTES
the left shoulder.  --------------------------    Bibasilar atelectasis.  Decreased vascular congestion    ______________________________________ Electronically signed by: VON WALSH M.D. Date:     05/18/2024 Time:    15:42     US RENAL COMPLETE    Result Date: 5/16/2024  EXAM: RENAL (RETROPERITONEAL) ULTRASOUND  HISTORY: Acute kidney injury  TECHNIQUE: Sonography of the kidneys and urinary bladder was performed.  Images were obtained and stored in a permanent archive.  COMPARISON: None  FINDINGS:  Right Kidney: - Renal length: 10.3 cm. - Parenchyma: Normal echogenicity.  Normal parenchymal thickness. - Collecting system: No hydronephrosis. - Calculus: None. - Lesion: None.  Left Kidney: - Renal length: 9.1 cm. - Parenchyma: Normal echogenicity.  Normal parenchymal thickness. - Collecting system: No hydronephrosis. - Calculus: None. - Lesion: None.  Bladder: Normal distension.  No wall thickening.  No calculus.  There is a possible echogenic linear web or other debris within the bladder.        No hydronephrosis.  Possible septation within the bladder lumen versus other echogenic debris.  Correlate with urinalysis.    ______________________________________ Electronically signed by: APOLINAR PÉREZ D.O. Date:     05/16/2024 Time:    14:40     Echo (TTE) complete (PRN contrast/bubble/strain/3D)    Result Date: 5/9/2024    Left Ventricle: Low normal left ventricular systolic function with a visually estimated EF of 50 - 55%. Left ventricle size is normal. Findings consistent with mild concentric hypertrophy. Normal wall motion.   Aortic Valve: Not well visualized. Moderate sclerosis of the aortic valve. Mild stenosis of the aortic valve. AV mean gradient is 8 mmHg. AV area by continuity VTI is 1.3 cm2.   Tricuspid Valve: The estimated RVSP is 39 mmHg.   Left Atrium: Left atrium is moderately dilated.     XR CHEST PORTABLE    Result Date: 5/7/2024  EXAM: CHEST RADIOGRAPH  TECHNIQUE: Single frontal chest  radiograph.  HISTORY: Hypoxemia.  COMPARISON: 05/03/2024.  FINDINGS: Mild atelectasis at the lung bases.  Lungs are otherwise clear. The heart size is normal.  There is calcification in the aorta consistent with atherosclerosis. There is no pleural effusion. There is no pneumothorax.      1.  Mild atelectasis at the lung bases. 2.  Atherosclerosis. 3.  Otherwise unremarkable chest radiograph.    ______________________________________ Electronically signed by: SHAWN LICONA M.D. Date:     05/07/2024 Time:    17:13     XR CHEST PORTABLE    Result Date: 5/3/2024  EXAM:  AP CHEST.  HISTORY:  Labored breathing.  Comparison: Chest x-ray of 05/02/2024.  FINDINGS: The bones are unremarkable.  The cardiac silhouette is enlarged.  There are decreased left lung and stable right lung airspace opacities.  There is eventration of the right hemidiaphragm.      Impression:  Bilateral airspace opacities as described, consistent with edema versus infection.  Cardiomegaly.     ______________________________________ Electronically signed by: CHASTITY ROACH M.D. Date:     05/03/2024 Time:    04:11         Assessment and Plan:    This is a 78 y.o. year old female with past medical history of moderate dementia, nursing home resident, persistent atrial fibrillation, previously on amiodarone and Coumadin, normal LV ejection fraction, insulin requiring diabetes mellitus, admitted with COVID infection and noted to be in atrial fibrillation/flutter with RVR with difficult rate control, undergoing SAMANTHA/DCCV 5/21/2024 with now recurrent atrial fibrillation/atypical flutter with RVR.     1.  Difficulty titrating atrial flutter with RVR which has reverted since DCCV.  Spontaneous echo contrast but no thrombus.  Remains on Eliquis.  Have discussed options with patient including AV node modification with pacemaker placement.  She is undecided.  She is aware of issues and appears oriented to place and time on questioning.  She will need to make a

## 2024-05-22 NOTE — CONSULTS
Palliative Care Consult Note    5/22/2024 3:11 PM  Subjective:  Admit Date: 5/2/2024  PCP: Dudley Eduardo    Date of Service: 5/22/2024    Reason for Consultation:  Goals of Care, Code Status, Family Support     History Obtained From: EMR/Patient and their Family    History Of Present Illness:   The patient is a 78 y.o. female with PMH dementia, persistent atrial fibrillation, DM II, blindness bilaterally, hypothyroidism, CVA, PE who presented to [facility] on 05/02/2024 from OSH where work up was concerning for COVID-19, Afib RVR, acute hypoxemic respiratory failure.  She is in manage to hospital service and was placed on dexamethasone, pharmacy to dose tocilizumab and Cardizem drip.  She then became bradycardic, medication adjusted and that was resolved.  Plans were for discharge to nursing facility on 5/7/2023, however, she had a change of condition with worsening dyspnea and elevated heart rate.  Solu-Medrol ordered.  Cardiology consulted for persistent A-fib with rapid rates.  Multiple medication adjustments have been made and rate has been difficult to control.  She has had fluctuating tachycardia as well as bradycardia.  She has been continued on Eliquis.  COVID isolation discontinued on 5/20/2024.  She underwent SAMANTHA and no thrombus was noted in the right atrium, left atrial appendage free of thrombus, mild aortic stenosis, EF 45%, small slitlike PFO without significant right to left shunting appreciated, DCCV performed on 5/21/2024.  Sinus rhythm was noted, however, overnight the patient reverted back to A-fib RVR.  Cardiology has reevaluated and has discussed possible AV shweta ablation with pacemaker placement.  Patient remains undecided.  Palliative care was consulted to assist with goals of care.    Past Medical History:        Diagnosis Date    A-fib (HCC)     CAD (coronary artery disease)     Cerebral artery occlusion with cerebral infarction ([facility])     COPD (chronic obstructive pulmonary disease) ([facility])      COVID     Dementia (HCC)     Diabetes mellitus (HCC)     Functional blindness 09/26/2011    Hx of blood clots     Hyperlipidemia     Hypertension     Knee arthroplasty 08/09/2011    Thyroid disease     TIA (transient ischemic attack)      Past Surgical History:        Procedure Laterality Date    ABDOMEN SURGERY      APPENDECTOMY      CHOLECYSTECTOMY      TOTAL KNEE ARTHROPLASTY  08/09/2011     Home Medications:  Prior to Admission medications    Medication Sig Start Date End Date Taking? Authorizing Provider   amiodarone (CORDARONE) 200 MG tablet Take 1 tablet by mouth daily 5/8/24  Yes Darwin Ledesma DO   apixaban (ELIQUIS) 5 MG TABS tablet Take 1 tablet by mouth 2 times daily 5/7/24  Yes Darwin Ledesma DO   dilTIAZem (CARDIZEM CD) 360 MG extended release capsule Take 1 capsule by mouth daily 5/7/24  Yes Darwin Ledesma DO   insulin glargine (LANTUS) 100 UNIT/ML injection vial Inject 15 Units into the skin 2 times daily 5/7/24  Yes Darwin Ledesma DO   metoprolol tartrate 75 MG TABS Take 150 mg by mouth 2 times daily 5/7/24  Yes Darwin Ledesma DO   Ergocalciferol (VITAMIN D) 55836 units CAPS Take 50,000 Units by mouth once a week 5/10/24  Yes Darwin Ledesma DO   budesonide-formoterol (SYMBICORT) 160-4.5 MCG/ACT AERO Inhale 2 puffs into the lungs 2 times daily 5/7/24  Yes Darwin Ledesma DO   insulin regular (HUMULIN R;NOVOLIN R) 100 UNIT/ML injection Inject 7 Units into the skin 3 times daily (with meals)   Yes Johnnie Chua MD   acetaminophen (TYLENOL) 500 MG tablet Take 2 tablets by mouth in the morning and 2 tablets in the evening.   Yes Johnnie Chua MD   losartan (COZAAR) 50 MG tablet Take 1 tablet by mouth daily   Yes Johnnie Chua MD   omeprazole (PRILOSEC) 20 MG delayed release capsule Take 1 capsule by mouth daily   Yes Johnnie Chua MD   levothyroxine (SYNTHROID) 75 MCG tablet Take 1 tablet by mouth Daily   Yes Harshil

## 2024-05-23 LAB
ANION GAP SERPL CALCULATED.3IONS-SCNC: 10 MMOL/L (ref 7–19)
BASOPHILS # BLD: 0.1 K/UL (ref 0–0.2)
BASOPHILS NFR BLD: 0.4 % (ref 0–1)
BUN SERPL-MCNC: 38 MG/DL (ref 8–23)
CALCIUM SERPL-MCNC: 8.2 MG/DL (ref 8.8–10.2)
CHLORIDE SERPL-SCNC: 97 MMOL/L (ref 98–111)
CO2 SERPL-SCNC: 27 MMOL/L (ref 22–29)
CREAT SERPL-MCNC: 1.1 MG/DL (ref 0.5–0.9)
EOSINOPHIL # BLD: 0.7 K/UL (ref 0–0.6)
EOSINOPHIL NFR BLD: 5.8 % (ref 0–5)
ERYTHROCYTE [DISTWIDTH] IN BLOOD BY AUTOMATED COUNT: 15.1 % (ref 11.5–14.5)
GLUCOSE BLD-MCNC: 201 MG/DL (ref 70–99)
GLUCOSE BLD-MCNC: 202 MG/DL (ref 70–99)
GLUCOSE BLD-MCNC: 222 MG/DL (ref 70–99)
GLUCOSE BLD-MCNC: 282 MG/DL (ref 70–99)
GLUCOSE SERPL-MCNC: 231 MG/DL (ref 74–109)
HCT VFR BLD AUTO: 36.1 % (ref 37–47)
HGB BLD-MCNC: 10.8 G/DL (ref 12–16)
IMM GRANULOCYTES # BLD: 0.1 K/UL
LYMPHOCYTES # BLD: 1.1 K/UL (ref 1.1–4.5)
LYMPHOCYTES NFR BLD: 8.7 % (ref 20–40)
MCH RBC QN AUTO: 24.3 PG (ref 27–31)
MCHC RBC AUTO-ENTMCNC: 29.9 G/DL (ref 33–37)
MCV RBC AUTO: 81.3 FL (ref 81–99)
MONOCYTES # BLD: 0.6 K/UL (ref 0–0.9)
MONOCYTES NFR BLD: 4.7 % (ref 0–10)
NEUTROPHILS # BLD: 9.8 K/UL (ref 1.5–7.5)
NEUTS SEG NFR BLD: 79.6 % (ref 50–65)
PERFORMED ON: ABNORMAL
PLATELET # BLD AUTO: 245 K/UL (ref 130–400)
PMV BLD AUTO: 9.6 FL (ref 9.4–12.3)
POTASSIUM SERPL-SCNC: 5 MMOL/L (ref 3.5–5)
RBC # BLD AUTO: 4.44 M/UL (ref 4.2–5.4)
SODIUM SERPL-SCNC: 134 MMOL/L (ref 136–145)
WBC # BLD AUTO: 12.3 K/UL (ref 4.8–10.8)

## 2024-05-23 PROCEDURE — 2580000003 HC RX 258: Performed by: NURSE ANESTHETIST, CERTIFIED REGISTERED

## 2024-05-23 PROCEDURE — 6370000000 HC RX 637 (ALT 250 FOR IP): Performed by: INTERNAL MEDICINE

## 2024-05-23 PROCEDURE — 99232 SBSQ HOSP IP/OBS MODERATE 35: CPT | Performed by: PHYSICIAN ASSISTANT

## 2024-05-23 PROCEDURE — 82962 GLUCOSE BLOOD TEST: CPT

## 2024-05-23 PROCEDURE — 6370000000 HC RX 637 (ALT 250 FOR IP): Performed by: PHYSICIAN ASSISTANT

## 2024-05-23 PROCEDURE — 36415 COLL VENOUS BLD VENIPUNCTURE: CPT

## 2024-05-23 PROCEDURE — 6360000002 HC RX W HCPCS: Performed by: INTERNAL MEDICINE

## 2024-05-23 PROCEDURE — 6360000002 HC RX W HCPCS: Performed by: PHYSICIAN ASSISTANT

## 2024-05-23 PROCEDURE — 99232 SBSQ HOSP IP/OBS MODERATE 35: CPT | Performed by: INTERNAL MEDICINE

## 2024-05-23 PROCEDURE — 85025 COMPLETE CBC W/AUTO DIFF WBC: CPT

## 2024-05-23 PROCEDURE — 94760 N-INVAS EAR/PLS OXIMETRY 1: CPT

## 2024-05-23 PROCEDURE — 6370000000 HC RX 637 (ALT 250 FOR IP): Performed by: STUDENT IN AN ORGANIZED HEALTH CARE EDUCATION/TRAINING PROGRAM

## 2024-05-23 PROCEDURE — 80048 BASIC METABOLIC PNL TOTAL CA: CPT

## 2024-05-23 PROCEDURE — 2700000000 HC OXYGEN THERAPY PER DAY

## 2024-05-23 PROCEDURE — 94640 AIRWAY INHALATION TREATMENT: CPT

## 2024-05-23 PROCEDURE — 2140000000 HC CCU INTERMEDIATE R&B

## 2024-05-23 RX ADMIN — IPRATROPIUM BROMIDE 2 PUFF: 17 AEROSOL, METERED RESPIRATORY (INHALATION) at 11:02

## 2024-05-23 RX ADMIN — SODIUM CHLORIDE, PRESERVATIVE FREE 10 ML: 5 INJECTION INTRAVENOUS at 08:26

## 2024-05-23 RX ADMIN — LEVALBUTEROL HYDROCHLORIDE 0.63 MG: 0.63 SOLUTION RESPIRATORY (INHALATION) at 21:55

## 2024-05-23 RX ADMIN — IPRATROPIUM BROMIDE 2 PUFF: 17 AEROSOL, METERED RESPIRATORY (INHALATION) at 07:19

## 2024-05-23 RX ADMIN — MICONAZOLE NITRATE: 2 POWDER TOPICAL at 21:11

## 2024-05-23 RX ADMIN — METOPROLOL SUCCINATE 50 MG: 50 TABLET, EXTENDED RELEASE ORAL at 08:24

## 2024-05-23 RX ADMIN — Medication 5000 UNITS: at 08:26

## 2024-05-23 RX ADMIN — SODIUM CHLORIDE, PRESERVATIVE FREE 10 ML: 5 INJECTION INTRAVENOUS at 21:08

## 2024-05-23 RX ADMIN — DIPHENHYDRAMINE HYDROCHLORIDE AND ZINC ACETATE: 10; 1 CREAM TOPICAL at 21:10

## 2024-05-23 RX ADMIN — Medication 15 G: at 08:25

## 2024-05-23 RX ADMIN — DIPHENHYDRAMINE HYDROCHLORIDE AND ZINC ACETATE: 10; 1 CREAM TOPICAL at 12:19

## 2024-05-23 RX ADMIN — APIXABAN 5 MG: 5 TABLET, FILM COATED ORAL at 21:09

## 2024-05-23 RX ADMIN — BUDESONIDE AND FORMOTEROL FUMARATE DIHYDRATE 2 PUFF: 160; 4.5 AEROSOL RESPIRATORY (INHALATION) at 07:19

## 2024-05-23 RX ADMIN — DIPHENHYDRAMINE HYDROCHLORIDE AND ZINC ACETATE: 10; 1 CREAM TOPICAL at 18:03

## 2024-05-23 RX ADMIN — INSULIN LISPRO 1 UNITS: 100 INJECTION, SOLUTION INTRAVENOUS; SUBCUTANEOUS at 08:21

## 2024-05-23 RX ADMIN — DIGOXIN 250 MCG: 0.25 INJECTION INTRAMUSCULAR; INTRAVENOUS at 12:19

## 2024-05-23 RX ADMIN — INSULIN LISPRO 1 UNITS: 100 INJECTION, SOLUTION INTRAVENOUS; SUBCUTANEOUS at 18:03

## 2024-05-23 RX ADMIN — DONEPEZIL HYDROCHLORIDE 10 MG: 10 TABLET, FILM COATED ORAL at 21:09

## 2024-05-23 RX ADMIN — ATORVASTATIN CALCIUM 20 MG: 20 TABLET, FILM COATED ORAL at 08:24

## 2024-05-23 RX ADMIN — INSULIN LISPRO 1 UNITS: 100 INJECTION, SOLUTION INTRAVENOUS; SUBCUTANEOUS at 12:19

## 2024-05-23 RX ADMIN — APIXABAN 5 MG: 5 TABLET, FILM COATED ORAL at 08:24

## 2024-05-23 RX ADMIN — HYDROCORTISONE ACETATE: 1 CREAM TOPICAL at 08:22

## 2024-05-23 RX ADMIN — IPRATROPIUM BROMIDE 2 PUFF: 17 AEROSOL, METERED RESPIRATORY (INHALATION) at 14:54

## 2024-05-23 RX ADMIN — LEVALBUTEROL HYDROCHLORIDE 0.63 MG: 0.63 SOLUTION RESPIRATORY (INHALATION) at 07:18

## 2024-05-23 RX ADMIN — INSULIN GLARGINE 10 UNITS: 100 INJECTION, SOLUTION SUBCUTANEOUS at 21:08

## 2024-05-23 RX ADMIN — DIGOXIN 250 MCG: 0.25 INJECTION INTRAMUSCULAR; INTRAVENOUS at 01:06

## 2024-05-23 RX ADMIN — IPRATROPIUM BROMIDE 2 PUFF: 17 AEROSOL, METERED RESPIRATORY (INHALATION) at 19:37

## 2024-05-23 RX ADMIN — DIPHENHYDRAMINE HYDROCHLORIDE AND ZINC ACETATE: 10; 1 CREAM TOPICAL at 08:22

## 2024-05-23 RX ADMIN — DIGOXIN 250 MCG: 0.25 INJECTION INTRAMUSCULAR; INTRAVENOUS at 06:21

## 2024-05-23 RX ADMIN — LEVALBUTEROL HYDROCHLORIDE 0.63 MG: 0.63 SOLUTION RESPIRATORY (INHALATION) at 14:54

## 2024-05-23 RX ADMIN — METOPROLOL SUCCINATE 75 MG: 25 TABLET, FILM COATED, EXTENDED RELEASE ORAL at 21:13

## 2024-05-23 RX ADMIN — HYDROCORTISONE ACETATE: 1 CREAM TOPICAL at 21:10

## 2024-05-23 RX ADMIN — DIGOXIN 250 MCG: 250 TABLET ORAL at 18:03

## 2024-05-23 RX ADMIN — BUDESONIDE AND FORMOTEROL FUMARATE DIHYDRATE 2 PUFF: 160; 4.5 AEROSOL RESPIRATORY (INHALATION) at 19:37

## 2024-05-23 RX ADMIN — MICONAZOLE NITRATE: 2 POWDER TOPICAL at 08:21

## 2024-05-23 NOTE — PROGRESS NOTES
Date:2024  Patient: Edie Cat  : 1945  MRN:[id number]  CODE:                                                                        PCP:Dudley Eduardo    Admit Date: 2024  7:17 PM   LOS: 21 days     Hospital course :  Ms. Wells was admitted for COVID 19 infection, mental status change, and hyponatremia.    She was initially admitted to CCU.  She had minimal symptoms regarding her COVID and this cleared quickly.  Her sodium remained low in spite of maximum medical management.  On , she was dosed with Samsca and had improvement in her sodium.   On  the sodium improved to 133 and at the time of discharge back to SNF the patient developed shortness of breath and started treatment for COPD so the discharge was held.  Still requiring oxygen by 2 L more likely from atelectasis due to deconditioning could be from the COVID.  Presently on urea salt with a dip in sodium to 122 and recover back to 136.   Treated for hyperkalemia on   Developed A-fib with RVR consulted cardiology for further evaluation, medication adjusted during this admission with Coumadin changed to Eliquis for ease of anticoagulation. On  developed severe tachycardia found to have A-fib with RVR consulted cardiology and transferred to PCU for further care. EP cardiologist on board , got a DC cardioversion on .  However converted back to A-fib finally cardiology decided on placement of pacemaker.   Suspected constipation with some intermittent nausea and vomiting and treated for the same to ensure bowel movement regularly         Subjective:   seen and evaluated at bedside, she is alert and oriented but have poor understanding of the pacemaker however daughter is the POA agreed for pacemaker, discussed with RN to contact Dr. Ulrich for the same.  She is motivated to do incentive spirometry, still having heart rate around 120, discussed with RN at bedside to get out of bed to chair with physical therapy.   (L) 12.0 - 16.0 g/dL    Hematocrit 36.1 (L) 37.0 - 47.0 %    MCV 81.3 81.0 - 99.0 fL    MCH 24.3 (L) 27.0 - 31.0 pg    MCHC 29.9 (L) 33.0 - 37.0 g/dL    RDW 15.1 (H) 11.5 - 14.5 %    Platelets 245 130 - 400 K/uL    MPV 9.6 9.4 - 12.3 fL    Neutrophils % 79.6 (H) 50.0 - 65.0 %    Lymphocytes % 8.7 (L) 20.0 - 40.0 %    Monocytes % 4.7 0.0 - 10.0 %    Eosinophils % 5.8 (H) 0.0 - 5.0 %    Basophils % 0.4 0.0 - 1.0 %    Neutrophils Absolute 9.8 (H) 1.5 - 7.5 K/uL    Immature Granulocytes # 0.1 K/uL    Lymphocytes Absolute 1.1 1.1 - 4.5 K/uL    Monocytes Absolute 0.60 0.00 - 0.90 K/uL    Eosinophils Absolute 0.70 (H) 0.00 - 0.60 K/uL    Basophils Absolute 0.10 0.00 - 0.20 K/uL   Basic Metabolic Panel w/ Reflex to MG    Collection Time: 05/23/24  1:38 AM   Result Value Ref Range    Sodium 134 (L) 136 - 145 mmol/L    Potassium reflex Magnesium 5.0 3.5 - 5.0 mmol/L    Chloride 97 (L) 98 - 111 mmol/L    CO2 27 22 - 29 mmol/L    Anion Gap 10 7 - 19 mmol/L    Glucose 231 (H) 74 - 109 mg/dL    BUN 38 (H) 8 - 23 mg/dL    Creatinine 1.1 (H) 0.5 - 0.9 mg/dL    Est, Glom Filt Rate 51 (A) >60    Calcium 8.2 (L) 8.8 - 10.2 mg/dL   POCT Glucose    Collection Time: 05/23/24  7:57 AM   Result Value Ref Range    POC Glucose 201 (H) 70 - 99 mg/dl    Performed on Accu[employer]k    POCT Glucose    Collection Time: 05/23/24 11:34 AM   Result Value Ref Range    POC Glucose 202 (H) 70 - 99 mg/dl    Performed on AccuChek         I/O last 3 completed shifts:  In: 716 [P.O.:716]  Out: 2100 [Urine:2100]     sodium chloride flush  5-40 mL IntraVENous 2 times per day    hydrocortisone   Topical BID    digoxin  250 mcg Oral Daily    insulin glargine  10 Units SubCUTAneous Nightly    insulin lispro  0-4 Units SubCUTAneous TID WC    insulin lispro  0-4 Units SubCUTAneous Nightly    urea  15 g Oral Daily    metoprolol succinate  50 mg Oral Daily    diphenhydrAMINE-zinc acetate   Topical 4x daily    [Held by provider] furosemide  40 mg Oral Daily    miconazole

## 2024-05-23 NOTE — PLAN OF CARE
Problem: Discharge Planning  Goal: Discharge to home or other facility with appropriate resources  Outcome: Progressing     Problem: Safety - Adult  Goal: Free from fall injury  Outcome: Progressing     Problem: ABCDS Injury Assessment  Goal: Absence of physical injury  Outcome: Progressing     Problem: Skin/Tissue Integrity  Goal: Absence of new skin breakdown  Description:  Monitor for areas of redness and/or skin breakdown    Outcome: Progressing     Problem: Pain  Goal: Verbalizes/displays adequate comfort level or baseline comfort level  Outcome: Progressing     Problem: Chronic Conditions and Co-morbidities  Goal: Patient's chronic conditions and co-morbidity symptoms are monitored and maintained or improved  Outcome: Progressing     Problem: Neurosensory - Adult  Goal: Achieves stable or improved neurological status  Outcome: Progressing     Problem: Respiratory - Adult  Goal: Achieves optimal ventilation and oxygenation  Outcome: Progressing     Problem: Cardiovascular - Adult  Goal: Maintains optimal cardiac output and hemodynamic stability  Outcome: Progressing     Problem: Skin/Tissue Integrity - Adult  Goal: Skin integrity remains intact  Outcome: Progressing     Problem: Musculoskeletal - Adult  Goal: Return mobility to safest level of function  Outcome: Progressing     Problem: Gastrointestinal - Adult  Goal: Minimal or absence of nausea and vomiting  Outcome: Progressing     Problem: Genitourinary - Adult  Goal: Absence of urinary retention  Outcome: Progressing     Problem: Infection - Adult  Goal: Absence of infection at discharge  Outcome: Progressing     Problem: Metabolic/Fluid and Electrolytes - Adult  Goal: Electrolytes maintained within normal limits  Outcome: Progressing     Problem: Anxiety  Goal: Will report anxiety at manageable levels  Description: INTERVENTIONS:  1. Administer medication as ordered  2. Teach and rehearse alternative coping skills  3. Provide emotional support with 1:1  patient/family  5. Respect patient/family right to receive or not to receive information  6. Serve as a liaison between patient and family and health care team  7. Initiate Consults from Ethics, Palliative Care or initiate Family Care Conference as is appropriate  Outcome: Progressing     Problem: Confusion  Goal: Confusion, delirium, dementia, or psychosis is improved or at baseline  Description: INTERVENTIONS:  1. Assess for possible contributors to thought disturbance, including medications, impaired vision or hearing, underlying metabolic abnormalities, dehydration, psychiatric diagnoses, and notify attending LIP  2. [place] high risk fall precautions, as indicated  3. Provide frequent short contacts to provide reality reorientation, refocusing and direction  4. Decrease environmental stimuli, including noise as appropriate  5. Monitor and intervene to maintain adequate nutrition, hydration, elimination, sleep and activity  6. If unable to ensure safety without constant attention obtain sitter and review sitter guidelines with assigned personnel  7. Initiate Psychosocial CNS and Spiritual Care consult, as indicated  Outcome: Progressing     Problem: Nutrition Deficit:  Goal: Optimize nutritional status  Outcome: Progressing

## 2024-05-23 NOTE — PROGRESS NOTES
Palliative Care Progress Note  5/23/2024 1:55 PM    Patient:  Edie Cat  YOB: 1945  Primary Care Physician: Dudley Eudardo  Advance Directive: Full Code  Admit Date: 5/2/2024       Hospital Day: 21  Portions of this note have been copied forward, however, changed to reflect the most current clinical status of this patient.    CHIEF COMPLAINT/REASON FOR CONSULTATION Goals of care, family support, Code status, symptom management     SUBJECTIVE:  Ms. Cat has no new complaints today. Reports slight improvement in itching. Denies chest pain. Reports poor appetite     HPI:  The patient is a 78 y.o. female with PMH dementia, persistent atrial fibrillation, DM II, blindness bilaterally, hypothyroidism, CVA, PE who presented to [facility] on 05/02/2024 from OSH where work up was concerning for COVID-19, Afib RVR, acute hypoxemic respiratory failure.  She is in manage to hospital service and was placed on dexamethasone, pharmacy to dose tocilizumab and Cardizem drip.  She then became bradycardic, medication adjusted and that was resolved.  Plans were for discharge to nursing facility on 5/7/2023, however, she had a change of condition with worsening dyspnea and elevated heart rate.  Solu-Medrol ordered.  Cardiology consulted for persistent A-fib with rapid rates.  Multiple medication adjustments have been made and rate has been difficult to control.  She has had fluctuating tachycardia as well as bradycardia.  She has been continued on Eliquis.  COVID isolation discontinued on 5/20/2024.  She underwent SAMANTHA and no thrombus was noted in the right atrium, left atrial appendage free of thrombus, mild aortic stenosis, EF 45%, small slitlike PFO without significant right to left shunting appreciated, DCCV performed on 5/21/2024.  Sinus rhythm was noted, however, overnight the patient reverted back to A-fib RVR.  Cardiology has reevaluated and has discussed possible AV shweta ablation with pacemaker placement.

## 2024-05-23 NOTE — PROGRESS NOTES
Recent Labs     05/22/24  0134 05/23/24  0138   BUN 56* 38*       Recent Labs     05/22/24  0134 05/23/24  0138   CREATININE 1.2* 1.1*       Estimated Creatinine Clearance: 43 mL/min (A) (based on SCr of 1.1 mg/dL (H)).        Plan: Message to Akosua Soria:  Order for loading dose of Digoxin and then Digoxin 250 mcg daily. Based on renal function recommended dose would be 62.5 mcg daily. Please consider changing dose. Thanks!    Electronically signed by Emely Yost RP on 5/23/2024 at 8:21 AM

## 2024-05-23 NOTE — PLAN OF CARE
Progressing     Problem: Skin/Tissue Integrity - Adult  Goal: Skin integrity remains intact  5/23/2024 1636 by Mariela Dixon RN  Outcome: Progressing  5/23/2024 0349 by Anna Atkinson RN  Outcome: Progressing     Problem: Musculoskeletal - Adult  Goal: Return mobility to safest level of function  5/23/2024 1636 by Mariela Dixon RN  Outcome: Progressing  5/23/2024 0349 by Anna Atkinson RN  Outcome: Progressing     Problem: Gastrointestinal - Adult  Goal: Minimal or absence of nausea and vomiting  5/23/2024 1636 by Mariela Dixon RN  Outcome: Progressing  5/23/2024 0349 by Anna Atkinson RN  Outcome: Progressing     Problem: Genitourinary - Adult  Goal: Absence of urinary retention  5/23/2024 1636 by Mariela Dixon RN  Outcome: Progressing  5/23/2024 0349 by Anna Atkinson RN  Outcome: Progressing     Problem: Infection - Adult  Goal: Absence of infection at discharge  5/23/2024 1636 by Mariela Dixon RN  Outcome: Progressing  5/23/2024 0349 by Anna Atkinson RN  Outcome: Progressing     Problem: Metabolic/Fluid and Electrolytes - Adult  Goal: Electrolytes maintained within normal limits  5/23/2024 1636 by Mariela Dixon RN  Outcome: Progressing  5/23/2024 0349 by Anna Atkinson RN  Outcome: Progressing     Problem: Anxiety  Goal: Will report anxiety at manageable levels  Description: INTERVENTIONS:  1. Administer medication as ordered  2. Teach and rehearse alternative coping skills  3. Provide emotional support with 1:1 interaction with staff  5/23/2024 1636 by Mariela Dixon RN  Outcome: Progressing  5/23/2024 0349 by Anna Atkinson RN  Outcome: Progressing     Problem: Coping  Goal: Pt/Family able to verbalize concerns and demonstrate effective coping strategies  Description: INTERVENTIONS:  1. Assist patient/family to identify coping skills, available support systems and cultural and spiritual values  2. Provide emotional support, including active listening and acknowledgement of concerns of patient and  initiate Family Care Conference as is appropriate  5/23/2024 1636 by Mariela Dixon RN  Outcome: Progressing  5/23/2024 0349 by Anna Atkinson RN  Outcome: Progressing     Problem: Confusion  Goal: Confusion, delirium, dementia, or psychosis is improved or at baseline  Description: INTERVENTIONS:  1. Assess for possible contributors to thought disturbance, including medications, impaired vision or hearing, underlying metabolic abnormalities, dehydration, psychiatric diagnoses, and notify attending LIP  2. [place] high risk fall precautions, as indicated  3. Provide frequent short contacts to provide reality reorientation, refocusing and direction  4. Decrease environmental stimuli, including noise as appropriate  5. Monitor and intervene to maintain adequate nutrition, hydration, elimination, sleep and activity  6. If unable to ensure safety without constant attention obtain sitter and review sitter guidelines with assigned personnel  7. Initiate Psychosocial CNS and Spiritual Care consult, as indicated  5/23/2024 1636 by Mariela Dixon, RN  Outcome: Progressing  5/23/2024 0349 by Anna Atkinson, RN  Outcome: Progressing     Problem: Nutrition Deficit:  Goal: Optimize nutritional status  5/23/2024 1636 by Mariela Dixon RN  Outcome: Progressing  5/23/2024 0349 by Anna Atkinson RN  Outcome: Progressing

## 2024-05-24 LAB
ANION GAP SERPL CALCULATED.3IONS-SCNC: 8 MMOL/L (ref 7–19)
BASOPHILS # BLD: 0.1 K/UL (ref 0–0.2)
BASOPHILS NFR BLD: 0.6 % (ref 0–1)
BUN SERPL-MCNC: 35 MG/DL (ref 8–23)
CALCIUM SERPL-MCNC: 8 MG/DL (ref 8.8–10.2)
CHLORIDE SERPL-SCNC: 99 MMOL/L (ref 98–111)
CO2 SERPL-SCNC: 25 MMOL/L (ref 22–29)
CREAT SERPL-MCNC: 1 MG/DL (ref 0.5–0.9)
EOSINOPHIL # BLD: 0.8 K/UL (ref 0–0.6)
EOSINOPHIL NFR BLD: 7.2 % (ref 0–5)
ERYTHROCYTE [DISTWIDTH] IN BLOOD BY AUTOMATED COUNT: 15 % (ref 11.5–14.5)
GLUCOSE BLD-MCNC: 214 MG/DL (ref 70–99)
GLUCOSE BLD-MCNC: 234 MG/DL (ref 70–99)
GLUCOSE BLD-MCNC: 238 MG/DL (ref 70–99)
GLUCOSE BLD-MCNC: 275 MG/DL (ref 70–99)
GLUCOSE SERPL-MCNC: 216 MG/DL (ref 74–109)
HCT VFR BLD AUTO: 35.8 % (ref 37–47)
HGB BLD-MCNC: 10.7 G/DL (ref 12–16)
IMM GRANULOCYTES # BLD: 0.1 K/UL
LYMPHOCYTES # BLD: 1.1 K/UL (ref 1.1–4.5)
LYMPHOCYTES NFR BLD: 10.9 % (ref 20–40)
MCH RBC QN AUTO: 25.7 PG (ref 27–31)
MCHC RBC AUTO-ENTMCNC: 29.9 G/DL (ref 33–37)
MCV RBC AUTO: 86.1 FL (ref 81–99)
MONOCYTES # BLD: 0.6 K/UL (ref 0–0.9)
MONOCYTES NFR BLD: 5.4 % (ref 0–10)
NEUTROPHILS # BLD: 7.8 K/UL (ref 1.5–7.5)
NEUTS SEG NFR BLD: 74.8 % (ref 50–65)
PERFORMED ON: ABNORMAL
PLATELET # BLD AUTO: 180 K/UL (ref 130–400)
PMV BLD AUTO: 10.5 FL (ref 9.4–12.3)
POTASSIUM SERPL-SCNC: 5.6 MMOL/L (ref 3.5–5)
RBC # BLD AUTO: 4.16 M/UL (ref 4.2–5.4)
SODIUM SERPL-SCNC: 132 MMOL/L (ref 136–145)
WBC # BLD AUTO: 10.5 K/UL (ref 4.8–10.8)

## 2024-05-24 PROCEDURE — 6360000002 HC RX W HCPCS: Performed by: INTERNAL MEDICINE

## 2024-05-24 PROCEDURE — 80048 BASIC METABOLIC PNL TOTAL CA: CPT

## 2024-05-24 PROCEDURE — 6370000000 HC RX 637 (ALT 250 FOR IP): Performed by: INTERNAL MEDICINE

## 2024-05-24 PROCEDURE — 6370000000 HC RX 637 (ALT 250 FOR IP): Performed by: PHYSICIAN ASSISTANT

## 2024-05-24 PROCEDURE — 99232 SBSQ HOSP IP/OBS MODERATE 35: CPT | Performed by: PHYSICIAN ASSISTANT

## 2024-05-24 PROCEDURE — 2580000003 HC RX 258: Performed by: NURSE ANESTHETIST, CERTIFIED REGISTERED

## 2024-05-24 PROCEDURE — 2140000000 HC CCU INTERMEDIATE R&B

## 2024-05-24 PROCEDURE — 94640 AIRWAY INHALATION TREATMENT: CPT

## 2024-05-24 PROCEDURE — 6370000000 HC RX 637 (ALT 250 FOR IP): Performed by: HOSPITALIST

## 2024-05-24 PROCEDURE — 36415 COLL VENOUS BLD VENIPUNCTURE: CPT

## 2024-05-24 PROCEDURE — 6370000000 HC RX 637 (ALT 250 FOR IP): Performed by: STUDENT IN AN ORGANIZED HEALTH CARE EDUCATION/TRAINING PROGRAM

## 2024-05-24 PROCEDURE — 2700000000 HC OXYGEN THERAPY PER DAY

## 2024-05-24 PROCEDURE — 94760 N-INVAS EAR/PLS OXIMETRY 1: CPT

## 2024-05-24 PROCEDURE — 82962 GLUCOSE BLOOD TEST: CPT

## 2024-05-24 PROCEDURE — 97535 SELF CARE MNGMENT TRAINING: CPT

## 2024-05-24 PROCEDURE — 85025 COMPLETE CBC W/AUTO DIFF WBC: CPT

## 2024-05-24 RX ORDER — SODIUM POLYSTYRENE SULFONATE 15 G/60ML
15 SUSPENSION ORAL; RECTAL ONCE
Status: COMPLETED | OUTPATIENT
Start: 2024-05-24 | End: 2024-05-24

## 2024-05-24 RX ADMIN — METOPROLOL SUCCINATE 75 MG: 25 TABLET, FILM COATED, EXTENDED RELEASE ORAL at 21:01

## 2024-05-24 RX ADMIN — DIPHENHYDRAMINE HYDROCHLORIDE AND ZINC ACETATE: 10; 1 CREAM TOPICAL at 20:58

## 2024-05-24 RX ADMIN — Medication 15 G: at 09:23

## 2024-05-24 RX ADMIN — DIGOXIN 250 MCG: 250 TABLET ORAL at 09:24

## 2024-05-24 RX ADMIN — LEVALBUTEROL HYDROCHLORIDE 0.63 MG: 0.63 SOLUTION RESPIRATORY (INHALATION) at 21:55

## 2024-05-24 RX ADMIN — DIPHENHYDRAMINE HYDROCHLORIDE AND ZINC ACETATE: 10; 1 CREAM TOPICAL at 09:27

## 2024-05-24 RX ADMIN — SODIUM POLYSTYRENE SULFONATE 15 G: 15 SUSPENSION ORAL; RECTAL at 09:22

## 2024-05-24 RX ADMIN — MICONAZOLE NITRATE: 2 POWDER TOPICAL at 20:58

## 2024-05-24 RX ADMIN — INSULIN LISPRO 1 UNITS: 100 INJECTION, SOLUTION INTRAVENOUS; SUBCUTANEOUS at 09:25

## 2024-05-24 RX ADMIN — APIXABAN 5 MG: 5 TABLET, FILM COATED ORAL at 09:24

## 2024-05-24 RX ADMIN — HYDROCORTISONE ACETATE: 1 CREAM TOPICAL at 21:00

## 2024-05-24 RX ADMIN — INSULIN GLARGINE 10 UNITS: 100 INJECTION, SOLUTION SUBCUTANEOUS at 21:01

## 2024-05-24 RX ADMIN — SODIUM CHLORIDE, PRESERVATIVE FREE 10 ML: 5 INJECTION INTRAVENOUS at 21:00

## 2024-05-24 RX ADMIN — ATORVASTATIN CALCIUM 20 MG: 20 TABLET, FILM COATED ORAL at 09:24

## 2024-05-24 RX ADMIN — BUDESONIDE AND FORMOTEROL FUMARATE DIHYDRATE 2 PUFF: 160; 4.5 AEROSOL RESPIRATORY (INHALATION) at 18:49

## 2024-05-24 RX ADMIN — HYDROCORTISONE ACETATE: 1 CREAM TOPICAL at 20:59

## 2024-05-24 RX ADMIN — LEVALBUTEROL HYDROCHLORIDE 0.63 MG: 0.63 SOLUTION RESPIRATORY (INHALATION) at 14:39

## 2024-05-24 RX ADMIN — DIPHENHYDRAMINE HYDROCHLORIDE AND ZINC ACETATE: 10; 1 CREAM TOPICAL at 17:31

## 2024-05-24 RX ADMIN — LEVALBUTEROL HYDROCHLORIDE 0.63 MG: 0.63 SOLUTION RESPIRATORY (INHALATION) at 07:21

## 2024-05-24 RX ADMIN — METOPROLOL SUCCINATE 75 MG: 25 TABLET, FILM COATED, EXTENDED RELEASE ORAL at 09:24

## 2024-05-24 RX ADMIN — IPRATROPIUM BROMIDE 2 PUFF: 17 AEROSOL, METERED RESPIRATORY (INHALATION) at 07:22

## 2024-05-24 RX ADMIN — DONEPEZIL HYDROCHLORIDE 10 MG: 10 TABLET, FILM COATED ORAL at 21:01

## 2024-05-24 RX ADMIN — IPRATROPIUM BROMIDE 2 PUFF: 17 AEROSOL, METERED RESPIRATORY (INHALATION) at 14:39

## 2024-05-24 RX ADMIN — BUDESONIDE AND FORMOTEROL FUMARATE DIHYDRATE 2 PUFF: 160; 4.5 AEROSOL RESPIRATORY (INHALATION) at 07:21

## 2024-05-24 RX ADMIN — Medication 5000 UNITS: at 09:24

## 2024-05-24 RX ADMIN — ERGOCALCIFEROL 50000 UNITS: 1.25 CAPSULE ORAL at 09:43

## 2024-05-24 RX ADMIN — HYDROCORTISONE ACETATE: 1 CREAM TOPICAL at 09:27

## 2024-05-24 RX ADMIN — MICONAZOLE NITRATE: 2 POWDER TOPICAL at 09:28

## 2024-05-24 RX ADMIN — IPRATROPIUM BROMIDE 2 PUFF: 17 AEROSOL, METERED RESPIRATORY (INHALATION) at 18:49

## 2024-05-24 RX ADMIN — INSULIN LISPRO 1 UNITS: 100 INJECTION, SOLUTION INTRAVENOUS; SUBCUTANEOUS at 17:33

## 2024-05-24 RX ADMIN — SODIUM CHLORIDE, PRESERVATIVE FREE 10 ML: 5 INJECTION INTRAVENOUS at 09:28

## 2024-05-24 RX ADMIN — APIXABAN 5 MG: 5 TABLET, FILM COATED ORAL at 21:01

## 2024-05-24 RX ADMIN — IPRATROPIUM BROMIDE 2 PUFF: 17 AEROSOL, METERED RESPIRATORY (INHALATION) at 10:36

## 2024-05-24 NOTE — PLAN OF CARE
Problem: Discharge Planning  Goal: Discharge to home or other facility with appropriate resources  Outcome: Progressing     Problem: Safety - Adult  Goal: Free from fall injury  Outcome: Progressing     Problem: ABCDS Injury Assessment  Goal: Absence of physical injury  Outcome: Progressing     Problem: Skin/Tissue Integrity  Goal: Absence of new skin breakdown  Description:  Monitor for areas of redness and/or skin breakdown    Outcome: Progressing

## 2024-05-24 NOTE — PLAN OF CARE
caregivers  3. Reduce environmental stimuli, as able  4. Instruct patient/family in relaxation techniques, as appropriate  5. Assess for spiritual pain/suffering and initiate Spiritual Care, Psychosocial Clinical Specialist consults as needed  5/24/2024 0221 by Anna Atkinson RN  Outcome: Progressing  5/23/2024 1636 by Mariela Dixon RN  Outcome: Progressing     Problem: Change in Body Image  Goal: Pt/Family communicate acceptance of loss or change in body image and feel psychological comfort and peace  Description: INTERVENTIONS:  1. Assess patient/family anxiety and grief process related to change in body image, loss of functional status, loss of sense of self, and forgiveness  2. Provide emotional and spiritual support  3. Provide information about the patient's health status with consideration of family and cultural values  4. Communicate willingness to discuss loss and facilitate grief process with patient/family as appropriate  5. Emphasize sustaining relationships within family system and community, or jeff/spiritual traditions  6. Initiate Spiritual Care, Psychosocial Clinical Specialist consult as needed  5/24/2024 0221 by Anna Atkinson RN  Outcome: Progressing  5/23/2024 1636 by Marieal Dixon RN  Outcome: Progressing     Problem: Decision Making  Goal: Pt/Family able to effectively weigh alternatives and participate in decision making related to treatment and care  Description: INTERVENTIONS:  1. Determine when there are differences between patient's view, family's view, and healthcare provider's view of condition  2. Facilitate patient and family articulation of goals for care  3. Help patient and family identify pros/cons of alternative solutions  4. Provide information as requested by patient/family  5. Respect patient/family right to receive or not to receive information  6. Serve as a liaison between patient and family and health care team  7. Initiate Consults from Ethics, Palliative Care or  initiate Family Care Conference as is appropriate  5/24/2024 0221 by Anna Atkinson RN  Outcome: Progressing  5/23/2024 1636 by Mariela Dixon RN  Outcome: Progressing     Problem: Confusion  Goal: Confusion, delirium, dementia, or psychosis is improved or at baseline  Description: INTERVENTIONS:  1. Assess for possible contributors to thought disturbance, including medications, impaired vision or hearing, underlying metabolic abnormalities, dehydration, psychiatric diagnoses, and notify attending LIP  2. [place] high risk fall precautions, as indicated  3. Provide frequent short contacts to provide reality reorientation, refocusing and direction  4. Decrease environmental stimuli, including noise as appropriate  5. Monitor and intervene to maintain adequate nutrition, hydration, elimination, sleep and activity  6. If unable to ensure safety without constant attention obtain sitter and review sitter guidelines with assigned personnel  7. Initiate Psychosocial CNS and Spiritual Care consult, as indicated  5/24/2024 0221 by Anna Atkinson, RN  Outcome: Progressing  5/23/2024 1636 by Mariela Dixon, RN  Outcome: Progressing     Problem: Nutrition Deficit:  Goal: Optimize nutritional status  5/24/2024 0221 by Anna Atkinson, RN  Outcome: Progressing  5/23/2024 1636 by Mariela Dixon, RN  Outcome: Progressing

## 2024-05-24 NOTE — PROGRESS NOTES
Cardiology Progress Note Cruz Perkins MD      Patient:  Edie Cat  [id number]    Patient Active Problem List    Diagnosis Date Noted    Atrial fibrillation, persistent (HCC) 05/02/2024     Priority: High    Palliative care patient 05/22/2024     Priority: Low    Rash in adult 05/22/2024     Priority: Low    Diabetes mellitus (HCC) 09/26/2011     Priority: Low     Overview Note:     replace inactive diagnosis      Hyperglycemia 09/26/2011     Priority: Low    Morbid obesity (HCC) 09/26/2011     Priority: Low    History of stroke 09/26/2011     Priority: Low    Pulmonary embolus (HCC) 09/26/2011     Priority: Low    Systemic hypertension 09/26/2011     Priority: Low    Functional blindness 09/26/2011     Priority: Low    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     Priority: Low    Arthritis, degenerative 09/26/2011     Priority: Low    Dementia (HCC) 09/26/2011     Priority: Low    Former cigarette smoker 09/26/2011     Priority: Low    Psoriasis 09/26/2011     Priority: Low    Hyponatremia 09/26/2011     Priority: Low    Vitamin D deficiency 09/26/2011     Priority: Low    Atrial fibrillation (HCC) 08/12/2011     Priority: Low     Overview Note:     8/12/2011 DCCV on aminodarone      CAD (coronary artery disease) 08/12/2011     Priority: Low     Overview Note:     8/15/2011  cardiolyte negative for myocardial ischemia, EF  66%         Admit Date:  5/2/2024    Admission Problem List: Present on Admission:   (Resolved) COVID-19   Palliative care patient   Dementia (HCC)   Diabetes mellitus (HCC)   Functional blindness   Psoriasis   Vitamin D deficiency   Rash in adult      Cardiac Specific Data:  Specialty Problems          Cardiology Problems    Atrial fibrillation, persistent (HCC)        Atrial fibrillation (HCC)        CAD (coronary artery disease)        Pulmonary embolus (HCC)        Systemic hypertension         1.  Persistent atrial flutter, atypical, on amiodarone and Coumadin,  Left atrium is moderately dilated.     XR CHEST PORTABLE    Result Date: 5/7/2024  EXAM: CHEST RADIOGRAPH  TECHNIQUE: Single frontal chest radiograph.  HISTORY: Hypoxemia.  COMPARISON: 05/03/2024.  FINDINGS: Mild atelectasis at the lung bases.  Lungs are otherwise clear. The heart size is normal.  There is calcification in the aorta consistent with atherosclerosis. There is no pleural effusion. There is no pneumothorax.      1.  Mild atelectasis at the lung bases. 2.  Atherosclerosis. 3.  Otherwise unremarkable chest radiograph.    ______________________________________ Electronically signed by: SHAWN LICONA M.D. Date:     05/07/2024 Time:    17:13     XR CHEST PORTABLE    Result Date: 5/3/2024  EXAM:  AP CHEST.  HISTORY:  Labored breathing.  Comparison: Chest x-ray of 05/02/2024.  FINDINGS: The bones are unremarkable.  The cardiac silhouette is enlarged.  There are decreased left lung and stable right lung airspace opacities.  There is eventration of the right hemidiaphragm.      Impression:  Bilateral airspace opacities as described, consistent with edema versus infection.  Cardiomegaly.     ______________________________________ Electronically signed by: CHASTITY ROACH M.D. Date:     05/03/2024 Time:    04:11         Assessment and Plan:    This is a 78 y.o. year old female with past medical history of moderate dementia, nursing home resident, persistent atrial fibrillation, previously on amiodarone and Coumadin, normal LV ejection fraction, insulin requiring diabetes mellitus, admitted with COVID infection and noted to be in atrial fibrillation/flutter with RVR with difficult rate control, undergoing SAMANTHA/DCCV 5/21/2024 with now recurrent atrial fibrillation/atypical flutter with RVR.     1.  Rash does appear better off Cardizem.  Initiated on digoxin.  Increase Toprol-XL to 75 mg p.o. twice daily.  Can utilize esmolol if needed for rate control.  Family appears agreeable to AV node ablation with subsequent

## 2024-05-24 NOTE — PROGRESS NOTES
Facility/Department: [facility] PROGRESSIVE CARE  Occupational Therapy     Name: Edie Cat  : 1945  MRN: [id number]  Date of Service: 2024    Discharge Recommendations:  Patient would benefit from continued therapy after discharge, 24 hour supervision or assist    Patient Diagnosis(es): The primary encounter diagnosis was Persistent atrial fibrillation (HCC). A diagnosis of Atrial fibrillation, persistent (HCC) was also pertinent to this visit.  Past Medical History:  has a past medical history of A-fib (HCC), CAD (coronary artery disease), Cerebral artery occlusion with cerebral infarction (HCC), COPD (chronic obstructive pulmonary disease) (HCC), COVID, Dementia (HCC), Diabetes mellitus (HCC), Functional blindness, Hx of blood clots, Hyperlipidemia, Hypertension, Knee arthroplasty, Palliative care patient, Thyroid disease, and TIA (transient ischemic attack).  Past Surgical History:  has a past surgical history that includes Total knee arthroplasty (2011); Appendectomy; Cholecystectomy; and Abdomen surgery.    Treatment Diagnosis: Covid 19      Assessment   Assessment: Pt. tolerated tx fairly, required rest breaks throughout ADLs d/t becoming easily fatigued, and required encouragement to complete portions without assistance, required Mod-Min A for transfers. Pt. would benefit from continued skilled therapy to address deficits and further increased independence.  Treatment Diagnosis: Covid 19  Activity Tolerance  Activity Tolerance: Patient Tolerated treatment well        Plan   Occupational Therapy Plan  Times Per Week: 3-5  Times Per Day: Once a day     Restrictions  Restrictions/Precautions  Restrictions/Precautions: Isolation, Fall Risk    Subjective   General  Chart Reviewed: Yes  Patient assessed for rehabilitation services?: Yes  Family / Caregiver Present: No  Subjective  Subjective: Family with pt. at start of tx session, discussed bathing this AM and applying lotion to skin as pt. has been  c/o dry skin.  General Comment  Comments: Pt. reports no increase in pain by end of session, just increased fatigue post acts.     Social/Functional History  Social/Functional History  Lives With: Family  Type of Home: House  Home Layout: One level  Home Access: Ramped entrance  Bathroom Shower/Tub: Walk-in shower  Bathroom Toilet: Standard  Bathroom Equipment: Grab bars in shower, Shower chair  Bathroom Accessibility: Accessible  Home Equipment: Cane, Walker, standard, Wheelchair-manual  Receives Help From: Family  ADL Assistance: Needs assistance  Ambulation Assistance: Needs assistance  Transfer Assistance: Needs assistance  Active : No  Occupation: Retired  Additional Comments: ABOVE INFO TAKEN FROM CM NOTES    Objective   Temp: 97.5 °F (36.4 °C)  Pulse: 89  Heart Rate Source: Monitor  Respirations: 18  SpO2: 100 %  O2 Device: Nasal cannula  BP: 122/74  MAP (Calculated): 90  BP Location: Right upper arm  BP Method: Automatic  Patient Position: Supine    Safety Devices  Type of Devices: Bed alarm in place;Call light within reach    ADL  UE Bathing: Minimal assistance  LE Bathing: Moderate assistance  UE Dressing: Minimal assistance  LE Dressing: Maximum assistance  Toileting: Maximum assistance  Toileting Skilled Clinical Factors: Incontinent of bowel.  Skin Care: Soap and water    Transfers  Stand Step Transfers: Moderate assistance (Using RW.)  Sit to stand: Moderate assistance;Minimal assistance  Stand to sit: Moderate assistance;Minimal assistance    Education  Education Provided: Mobility Training, ADL Function, Transfer Training      Balance  Sitting Balance: Supervision  Standing Balance: Minimal assistance    Goals  Short Term Goals  Short Term Goal 1: Perform dressing and toileting with min A  Short Term Goal 2: Perform standing and transfers and min A  Short Term Goal 3: Upgrade activity to include light ambulatory ADL  Short Term Goal 4: Patient and family will be independent with therapeutic

## 2024-05-24 NOTE — PROGRESS NOTES
RT    apixaban  5 mg Oral BID    vitamin D  50,000 Units Oral Weekly    vitamin D  5,000 Units Oral Daily    donepezil  10 mg Oral Nightly    atorvastatin  20 mg Oral Daily     naloxone 0.4 mg in 10 mL sodium chloride syringe, sodium chloride flush, sodium chloride, nitroGLYCERIN, hydrOXYzine HCl, magic butt cream, diphenhydrAMINE, DOBUTamine, ondansetron, sennosides-docusate sodium, naloxone, diclofenac sodium, glucose, dextrose bolus **OR** dextrose bolus, glucagon (rDNA), dextrose, potassium chloride **OR** potassium chloride, magnesium sulfate, acetaminophen **OR** acetaminophen  ADULT DIET; Regular; 4 carb choices (60 gm/meal); Low Potassium (Less than 3000 mg/day); 1000 ml     Lab and other Data:     Recent Labs     05/22/24 0134 05/23/24 0138 05/24/24  0123   WBC 13.7* 12.3* 10.5   HGB 10.7* 10.8* 10.7*    245 180       Recent Labs     05/22/24 0134 05/23/24  0138 05/24/24  0123    134* 132*   K 5.0 5.0 5.6*   CL 97* 97* 99   CO2 27 27 25   BUN 56* 38* 35*   CREATININE 1.2* 1.1* 1.0*   GLUCOSE 84 231* 216*     Palliative Performance Scale:  40-50%    ECOG:3    CLINICAL PAIN ASSESSMENT:     Score 1-10 (if verbal):  0    Assessment/Plan   Principal Problem (Resolved):    COVID-19  Active Problems:    Atrial fibrillation, persistent (HCC)    Diabetes mellitus (HCC)    Functional blindness    Dementia (HCC)    Psoriasis    Vitamin D deficiency    Palliative care patient    Rash in adult    Visit Summary:  Patient seen at bedside.  Chart reviewed.  No acute overnight events noted.  Was able to speak with her daughter this morning and she confirms that they do want to move forward with ablation/pacemaker placement.  We discussed that we will tentatively occur on Tuesday or Wednesday of next week.  She is in agreement with this and asked that she be called and notified once that procedure has been scheduled. Continue hydrocortisone for rash which appears to have continued improvement.    Palliative  team will follow as needed upon return 05/28/2024.    Recommendations:   Palliative Care-GOC move forward w/ ablation/pacemaker placement, prolong life, improve itching, plans for placement Code status: Full code   A-fib RVR-Eliquis continued, Cardiology following, s/p SAMANTHA/DCCV 05/21/2024, plans to move forward w/ ablation/cardioversion  Diffuse pruritic rash-? Adverse rxn from Cardizem, concern for exfoliative dermatitis, d/w Cardiology and Pharmacy, Cardizem DC'd 05/22 and changed to Digoxin, continue topical hydrocortisone/benadryl for sx relief-improving off cardizem   COVID-19 with acute hypoxemic respiratory failure-received dexamethasone and tocilizumab, resolved  TAJ on CKD-improved, monitor  SIADH-resolved  Dementia-on Aricept as outpatient, mild/moderate, no behavioral disturbance noted   Blindness-plans for placement at this point     Thank you for consulting Palliative Care and allowing us to participate in the care of this patient.     Total Time Spent with patient assessment, interview of independent historian/HCS, workup/treatment review, discussion with medical team, review of current and home medications, and placement of orders/preparation of this note: 35 minutes                                 Electronically signed by Akosua Soria PA-C on 5/24/2024 at 8:24 AM    (Please note that portions of this note were completed with a voice recognition program.  Effortswere made to edit the dictations but occasionally words are mis-transcribed.)

## 2024-05-25 LAB
ANION GAP SERPL CALCULATED.3IONS-SCNC: 11 MMOL/L (ref 7–19)
ANISOCYTOSIS BLD QL SMEAR: ABNORMAL
BASOPHILS # BLD: 0.1 K/UL (ref 0–0.2)
BASOPHILS NFR BLD: 1 % (ref 0–1)
BUN SERPL-MCNC: 36 MG/DL (ref 8–23)
CALCIUM SERPL-MCNC: 8.5 MG/DL (ref 8.8–10.2)
CHLORIDE SERPL-SCNC: 95 MMOL/L (ref 98–111)
CO2 SERPL-SCNC: 24 MMOL/L (ref 22–29)
CREAT SERPL-MCNC: 0.9 MG/DL (ref 0.5–0.9)
EOSINOPHIL # BLD: 0.7 K/UL (ref 0–0.6)
EOSINOPHIL NFR BLD: 6.6 % (ref 0–5)
ERYTHROCYTE [DISTWIDTH] IN BLOOD BY AUTOMATED COUNT: 15.1 % (ref 11.5–14.5)
GLUCOSE BLD-MCNC: 189 MG/DL (ref 70–99)
GLUCOSE BLD-MCNC: 218 MG/DL (ref 70–99)
GLUCOSE BLD-MCNC: 267 MG/DL (ref 70–99)
GLUCOSE BLD-MCNC: 286 MG/DL (ref 70–99)
GLUCOSE SERPL-MCNC: 199 MG/DL (ref 74–109)
HCT VFR BLD AUTO: 40.6 % (ref 37–47)
HGB BLD-MCNC: 11.4 G/DL (ref 12–16)
HYPOCHROMIA BLD QL SMEAR: ABNORMAL
IMM GRANULOCYTES # BLD: 0.1 K/UL
LYMPHOCYTES # BLD: 1.7 K/UL (ref 1.1–4.5)
LYMPHOCYTES NFR BLD: 15.1 % (ref 20–40)
MCH RBC QN AUTO: 25.2 PG (ref 27–31)
MCHC RBC AUTO-ENTMCNC: 28.1 G/DL (ref 33–37)
MCV RBC AUTO: 89.6 FL (ref 81–99)
MONOCYTES # BLD: 0.9 K/UL (ref 0–0.9)
MONOCYTES NFR BLD: 7.7 % (ref 0–10)
NEUTROPHILS # BLD: 7.7 K/UL (ref 1.5–7.5)
NEUTS SEG NFR BLD: 68.9 % (ref 50–65)
PERFORMED ON: ABNORMAL
PLATELET # BLD AUTO: 261 K/UL (ref 130–400)
PLATELET SLIDE REVIEW: ADEQUATE
PMV BLD AUTO: 9.3 FL (ref 9.4–12.3)
POTASSIUM SERPL-SCNC: 4.8 MMOL/L (ref 3.5–5)
RBC # BLD AUTO: 4.53 M/UL (ref 4.2–5.4)
SODIUM SERPL-SCNC: 130 MMOL/L (ref 136–145)
WBC # BLD AUTO: 11.2 K/UL (ref 4.8–10.8)

## 2024-05-25 PROCEDURE — 6360000002 HC RX W HCPCS: Performed by: INTERNAL MEDICINE

## 2024-05-25 PROCEDURE — 6370000000 HC RX 637 (ALT 250 FOR IP): Performed by: INTERNAL MEDICINE

## 2024-05-25 PROCEDURE — 82962 GLUCOSE BLOOD TEST: CPT

## 2024-05-25 PROCEDURE — 85025 COMPLETE CBC W/AUTO DIFF WBC: CPT

## 2024-05-25 PROCEDURE — 6370000000 HC RX 637 (ALT 250 FOR IP): Performed by: STUDENT IN AN ORGANIZED HEALTH CARE EDUCATION/TRAINING PROGRAM

## 2024-05-25 PROCEDURE — 6370000000 HC RX 637 (ALT 250 FOR IP): Performed by: PHYSICIAN ASSISTANT

## 2024-05-25 PROCEDURE — 36415 COLL VENOUS BLD VENIPUNCTURE: CPT

## 2024-05-25 PROCEDURE — 94640 AIRWAY INHALATION TREATMENT: CPT

## 2024-05-25 PROCEDURE — 2140000000 HC CCU INTERMEDIATE R&B

## 2024-05-25 PROCEDURE — 80048 BASIC METABOLIC PNL TOTAL CA: CPT

## 2024-05-25 PROCEDURE — 2580000003 HC RX 258: Performed by: NURSE ANESTHETIST, CERTIFIED REGISTERED

## 2024-05-25 PROCEDURE — 94760 N-INVAS EAR/PLS OXIMETRY 1: CPT

## 2024-05-25 RX ORDER — INSULIN GLARGINE 100 [IU]/ML
15 INJECTION, SOLUTION SUBCUTANEOUS DAILY
Status: DISCONTINUED | OUTPATIENT
Start: 2024-05-25 | End: 2024-05-28

## 2024-05-25 RX ORDER — INSULIN LISPRO 100 [IU]/ML
0-8 INJECTION, SOLUTION INTRAVENOUS; SUBCUTANEOUS
Status: DISCONTINUED | OUTPATIENT
Start: 2024-05-25 | End: 2024-05-27

## 2024-05-25 RX ORDER — INSULIN LISPRO 100 [IU]/ML
0-4 INJECTION, SOLUTION INTRAVENOUS; SUBCUTANEOUS NIGHTLY
Status: DISCONTINUED | OUTPATIENT
Start: 2024-05-25 | End: 2024-05-27

## 2024-05-25 RX ADMIN — LEVALBUTEROL HYDROCHLORIDE 0.63 MG: 0.63 SOLUTION RESPIRATORY (INHALATION) at 14:35

## 2024-05-25 RX ADMIN — Medication 5000 UNITS: at 08:21

## 2024-05-25 RX ADMIN — DONEPEZIL HYDROCHLORIDE 10 MG: 10 TABLET, FILM COATED ORAL at 19:54

## 2024-05-25 RX ADMIN — INSULIN LISPRO 1 UNITS: 100 INJECTION, SOLUTION INTRAVENOUS; SUBCUTANEOUS at 08:30

## 2024-05-25 RX ADMIN — HYDROCORTISONE ACETATE: 1 CREAM TOPICAL at 19:56

## 2024-05-25 RX ADMIN — APIXABAN 5 MG: 5 TABLET, FILM COATED ORAL at 08:22

## 2024-05-25 RX ADMIN — METOPROLOL SUCCINATE 75 MG: 25 TABLET, FILM COATED, EXTENDED RELEASE ORAL at 19:54

## 2024-05-25 RX ADMIN — INSULIN GLARGINE 15 UNITS: 100 INJECTION, SOLUTION SUBCUTANEOUS at 11:41

## 2024-05-25 RX ADMIN — SODIUM CHLORIDE, PRESERVATIVE FREE 10 ML: 5 INJECTION INTRAVENOUS at 19:55

## 2024-05-25 RX ADMIN — ACETAMINOPHEN 650 MG: 325 TABLET ORAL at 15:54

## 2024-05-25 RX ADMIN — BUDESONIDE AND FORMOTEROL FUMARATE DIHYDRATE 2 PUFF: 160; 4.5 AEROSOL RESPIRATORY (INHALATION) at 18:30

## 2024-05-25 RX ADMIN — DIPHENHYDRAMINE HYDROCHLORIDE AND ZINC ACETATE: 10; 1 CREAM TOPICAL at 08:42

## 2024-05-25 RX ADMIN — Medication 15 G: at 08:22

## 2024-05-25 RX ADMIN — DIPHENHYDRAMINE HYDROCHLORIDE AND ZINC ACETATE: 10; 1 CREAM TOPICAL at 19:54

## 2024-05-25 RX ADMIN — ACETAMINOPHEN 650 MG: 325 TABLET ORAL at 03:17

## 2024-05-25 RX ADMIN — IPRATROPIUM BROMIDE 2 PUFF: 17 AEROSOL, METERED RESPIRATORY (INHALATION) at 18:29

## 2024-05-25 RX ADMIN — SODIUM CHLORIDE, PRESERVATIVE FREE 10 ML: 5 INJECTION INTRAVENOUS at 08:25

## 2024-05-25 RX ADMIN — LEVALBUTEROL HYDROCHLORIDE 0.63 MG: 0.63 SOLUTION RESPIRATORY (INHALATION) at 06:37

## 2024-05-25 RX ADMIN — PSYLLIUM HUSK 1 PACKET: 3.4 POWDER ORAL at 16:18

## 2024-05-25 RX ADMIN — IPRATROPIUM BROMIDE 2 PUFF: 17 AEROSOL, METERED RESPIRATORY (INHALATION) at 10:23

## 2024-05-25 RX ADMIN — DIGOXIN 250 MCG: 250 TABLET ORAL at 08:22

## 2024-05-25 RX ADMIN — INSULIN LISPRO 2 UNITS: 100 INJECTION, SOLUTION INTRAVENOUS; SUBCUTANEOUS at 13:18

## 2024-05-25 RX ADMIN — ATORVASTATIN CALCIUM 20 MG: 20 TABLET, FILM COATED ORAL at 08:22

## 2024-05-25 RX ADMIN — MICONAZOLE NITRATE: 2 POWDER TOPICAL at 19:55

## 2024-05-25 RX ADMIN — PSYLLIUM HUSK 1 PACKET: 3.4 POWDER ORAL at 19:54

## 2024-05-25 RX ADMIN — HYDROCORTISONE ACETATE: 1 CREAM TOPICAL at 08:41

## 2024-05-25 RX ADMIN — BUDESONIDE AND FORMOTEROL FUMARATE DIHYDRATE 2 PUFF: 160; 4.5 AEROSOL RESPIRATORY (INHALATION) at 06:42

## 2024-05-25 RX ADMIN — METOPROLOL SUCCINATE 75 MG: 25 TABLET, FILM COATED, EXTENDED RELEASE ORAL at 08:22

## 2024-05-25 RX ADMIN — MICONAZOLE NITRATE: 2 POWDER TOPICAL at 08:40

## 2024-05-25 RX ADMIN — APIXABAN 5 MG: 5 TABLET, FILM COATED ORAL at 19:54

## 2024-05-25 RX ADMIN — IPRATROPIUM BROMIDE 2 PUFF: 17 AEROSOL, METERED RESPIRATORY (INHALATION) at 14:37

## 2024-05-25 RX ADMIN — IPRATROPIUM BROMIDE 2 PUFF: 17 AEROSOL, METERED RESPIRATORY (INHALATION) at 06:42

## 2024-05-25 ASSESSMENT — PAIN DESCRIPTION - PAIN TYPE: TYPE: CHRONIC PAIN

## 2024-05-25 ASSESSMENT — PAIN SCALES - WONG BAKER: WONGBAKER_NUMERICALRESPONSE: NO HURT

## 2024-05-25 ASSESSMENT — PAIN SCALES - GENERAL
PAINLEVEL_OUTOF10: 0
PAINLEVEL_OUTOF10: 10

## 2024-05-25 ASSESSMENT — PAIN DESCRIPTION - FREQUENCY: FREQUENCY: CONTINUOUS

## 2024-05-25 ASSESSMENT — PAIN DESCRIPTION - DESCRIPTORS
DESCRIPTORS: SORE;SHARP
DESCRIPTORS: DISCOMFORT;DULL

## 2024-05-25 ASSESSMENT — PAIN DESCRIPTION - LOCATION
LOCATION: KNEE
LOCATION: LEG

## 2024-05-25 ASSESSMENT — PAIN DESCRIPTION - ORIENTATION
ORIENTATION: LEFT
ORIENTATION: LEFT

## 2024-05-25 ASSESSMENT — PAIN - FUNCTIONAL ASSESSMENT
PAIN_FUNCTIONAL_ASSESSMENT: PREVENTS OR INTERFERES SOME ACTIVE ACTIVITIES AND ADLS
PAIN_FUNCTIONAL_ASSESSMENT: PREVENTS OR INTERFERES SOME ACTIVE ACTIVITIES AND ADLS

## 2024-05-25 NOTE — PROGRESS NOTES
of Systems    Reviewed 12 system and found most of them negative except as stated above in subjective note    Objective:      Vital signs in last 24 hours:  Patient Vitals for the past 24 hrs:   BP Temp Temp src Pulse Resp SpO2 Weight   05/25/24 1143 (!) 129/103 97.9 °F (36.6 °C) Temporal (!) 126 18 98 % --   05/25/24 0801 (!) 124/97 97.1 °F (36.2 °C) Temporal (!) 109 18 96 % --   05/25/24 0637 -- -- -- -- -- 95 % --   05/25/24 0456 (!) 122/97 97 °F (36.1 °C) Temporal (!) 109 18 93 % 93.3 kg (205 lb 9.6 oz)   05/25/24 0135 128/69 97.2 °F (36.2 °C) Temporal 63 18 96 % --   05/24/24 2333 (!) 129/93 98.1 °F (36.7 °C) Temporal (!) 107 20 (!) 89 % --   05/24/24 2029 (!) 114/94 98.1 °F (36.7 °C) Temporal (!) 147 20 93 % --   05/24/24 1652 117/65 98.2 °F (36.8 °C) Temporal (!) 143 18 93 % --         Patient examined with appropriate PPE  Physical Exam:  Vital Signs: BP (!) 129/103   Pulse (!) 126   Temp 97.9 °F (36.6 °C) (Temporal)   Resp 18   Ht 1.575 m (5' 2\")   Wt 93.3 kg (205 lb 9.6 oz)   SpO2 98%   BMI 37.60 kg/m²   General appearance:.Lying comfortably in bed ,   HEENT: Normocephalic , Atraumatic, PERRL, JVP not raised  Chest: On inspection no use of accessory muscles of respiration, on auscultation vesicular breath sounds equal bilaterally no rales rhonchi or wheezing   cardiac: Regular rate and rhythm, S1, S2 normal. No murmurs, gallops, or rubs auscultated.   Abdomen:soft, non-tender; normal bowel sounds, no masses, no organomegaly.  Urogenital : no diaz, no suprapubic tenderness, no CVA tenderness  Extremities: No clubbing or cyanosis. No peripheral edema. Peripheral pulses palpable.  Neurologic: Alert and Cooperative , cranial nerves grossly intact, DTR equal, Power 5 /5   Psychology: No hallucination, no delusion, appropriate mood          Lab Review   Recent Results (from the past 24 hour(s))   POCT Glucose    Collection Time: 05/24/24  4:52 PM   Result Value Ref Range    POC Glucose 238 (H) 70 - 99  SubCUTAneous Daily    psyllium husk-aspartame  1 packet Oral BID    metoprolol succinate  75 mg Oral BID    sodium chloride flush  5-40 mL IntraVENous 2 times per day    hydrocortisone   Topical BID    digoxin  250 mcg Oral Daily    insulin lispro  0-4 Units SubCUTAneous TID WC    insulin lispro  0-4 Units SubCUTAneous Nightly    urea  15 g Oral Daily    diphenhydrAMINE-zinc acetate   Topical 4x daily    [Held by provider] furosemide  40 mg Oral Daily    miconazole   Topical BID    levalbuterol  0.63 mg Nebulization Q8H RT    budesonide-formoterol  2 puff Inhalation BID RT    ipratropium  2 puff Inhalation Q4H WA RT    apixaban  5 mg Oral BID    vitamin D  50,000 Units Oral Weekly    vitamin D  5,000 Units Oral Daily    donepezil  10 mg Oral Nightly    atorvastatin  20 mg Oral Daily           I have reviewed the patient's daily labs, including BMP and CBC with pertinent results discussed below.     Reviewed imaging     Assessment & Plan     Afib with RVR  Intermittent bradycardia  -- Cardiology following  -- Difficult to control HR due to subsequent bradycardia  -- Cardizem 180 mg daily 5/18; continue Toprol Xl 50 mg daily  -- Eliquis for AC  -- Underwent DCCV on 5/21; SAMANTHA negative for thrombus  -- Possible plan for Amiodarone  5/23 plan for pacemaker, on Cardizem 180 twice daily     COPD exacerbation, resolved  COVID 19 infection  -- Completed course of IV steroids and Azithro  -- Continue ICS/LABA/LAMA  -- PRN albuterol     Hypoxia  -- Possible fluid overload versus atelectasis from deconditioning  -- Check CXR  -- Stop IVF  5/23 presently on 2 L of oxygen by nasal cannula       Diffuse rash could be exfoliative dermatitis  -- Likely drug reaction vs COVID induced  -- Improving, starting to peel  -- PRN PO Benadryl  -- Topical Benadryl  -- PO prednisone  5/23 presently resolving with hydrocortisone and Benadryl cream     TAJ on CKD  -- Prerenal due to increased insensible losses from diffuse rash  -- Renal

## 2024-05-25 NOTE — PROGRESS NOTES
Assisted patient x2 to recliner, patient stood with rolling walker, sit to stand min assist.  Patient favored left knee a lot however and hobbled with a few steps to avoid bearing weigh to it and tried to quickly sit in chair- required more assist with few steps/turn.    Alarm on and call light within reach.    Electronically signed by Tena Jimenez RN on 5/25/2024 at 2:13 PM

## 2024-05-25 NOTE — PLAN OF CARE
infection at discharge  Outcome: Progressing     Problem: Metabolic/Fluid and Electrolytes - Adult  Goal: Electrolytes maintained within normal limits  Outcome: Progressing     Problem: Anxiety  Goal: Will report anxiety at manageable levels  Description: INTERVENTIONS:  1. Administer medication as ordered  2. Teach and rehearse alternative coping skills  3. Provide emotional support with 1:1 interaction with staff  Outcome: Progressing     Problem: Coping  Goal: Pt/Family able to verbalize concerns and demonstrate effective coping strategies  Description: INTERVENTIONS:  1. Assist patient/family to identify coping skills, available support systems and cultural and spiritual values  2. Provide emotional support, including active listening and acknowledgement of concerns of patient and caregivers  3. Reduce environmental stimuli, as able  4. Instruct patient/family in relaxation techniques, as appropriate  5. Assess for spiritual pain/suffering and initiate Spiritual Care, Psychosocial Clinical Specialist consults as needed  Outcome: Progressing     Problem: Change in Body Image  Goal: Pt/Family communicate acceptance of loss or change in body image and feel psychological comfort and peace  Description: INTERVENTIONS:  1. Assess patient/family anxiety and grief process related to change in body image, loss of functional status, loss of sense of self, and forgiveness  2. Provide emotional and spiritual support  3. Provide information about the patient's health status with consideration of family and cultural values  4. Communicate willingness to discuss loss and facilitate grief process with patient/family as appropriate  5. Emphasize sustaining relationships within family system and community, or jeff/spiritual traditions  6. Initiate Spiritual Care, Psychosocial Clinical Specialist consult as needed  Outcome: Progressing     Problem: Decision Making  Goal: Pt/Family able to effectively weigh alternatives and  participate in decision making related to treatment and care  Description: INTERVENTIONS:  1. Determine when there are differences between patient's view, family's view, and healthcare provider's view of condition  2. Facilitate patient and family articulation of goals for care  3. Help patient and family identify pros/cons of alternative solutions  4. Provide information as requested by patient/family  5. Respect patient/family right to receive or not to receive information  6. Serve as a liaison between patient and family and health care team  7. Initiate Consults from Ethics, Palliative Care or initiate Family Care Conference as is appropriate  Outcome: Progressing     Problem: Confusion  Goal: Confusion, delirium, dementia, or psychosis is improved or at baseline  Description: INTERVENTIONS:  1. Assess for possible contributors to thought disturbance, including medications, impaired vision or hearing, underlying metabolic abnormalities, dehydration, psychiatric diagnoses, and notify attending LIP  2. [place] high risk fall precautions, as indicated  3. Provide frequent short contacts to provide reality reorientation, refocusing and direction  4. Decrease environmental stimuli, including noise as appropriate  5. Monitor and intervene to maintain adequate nutrition, hydration, elimination, sleep and activity  6. If unable to ensure safety without constant attention obtain sitter and review sitter guidelines with assigned personnel  7. Initiate Psychosocial CNS and Spiritual Care consult, as indicated  Outcome: Progressing     Problem: Nutrition Deficit:  Goal: Optimize nutritional status  Outcome: Progressing

## 2024-05-25 NOTE — PROGRESS NOTES
Review of Systems    Reviewed 12 system and found most of them negative except as stated above in subjective note    Objective:      Vital signs in last 24 hours:  Patient Vitals for the past 24 hrs:   BP Temp Temp src Pulse Resp SpO2 Weight   05/25/24 1143 (!) 129/103 97.9 °F (36.6 °C) Temporal (!) 126 18 98 % --   05/25/24 0801 (!) 124/97 97.1 °F (36.2 °C) Temporal (!) 109 18 96 % --   05/25/24 0637 -- -- -- -- -- 95 % --   05/25/24 0456 (!) 122/97 97 °F (36.1 °C) Temporal (!) 109 18 93 % 93.3 kg (205 lb 9.6 oz)   05/25/24 0135 128/69 97.2 °F (36.2 °C) Temporal 63 18 96 % --   05/24/24 2333 (!) 129/93 98.1 °F (36.7 °C) Temporal (!) 107 20 (!) 89 % --   05/24/24 2029 (!) 114/94 98.1 °F (36.7 °C) Temporal (!) 147 20 93 % --   05/24/24 1652 117/65 98.2 °F (36.8 °C) Temporal (!) 143 18 93 % --         Patient examined with appropriate PPE  Physical Exam:  Vital Signs: BP (!) 129/103   Pulse (!) 126   Temp 97.9 °F (36.6 °C) (Temporal)   Resp 18   Ht 1.575 m (5' 2\")   Wt 93.3 kg (205 lb 9.6 oz)   SpO2 98%   BMI 37.60 kg/m²   General appearance:.Lying comfortably in bed ,   HEENT: Normocephalic , Atraumatic, PERRL, JVP not raised  Chest: On inspection no use of accessory muscles of respiration, on auscultation vesicular breath sounds equal bilaterally no rales rhonchi or wheezing   cardiac: Regular rate and rhythm, S1, S2 normal. No murmurs, gallops, or rubs auscultated.   Abdomen:soft, non-tender; normal bowel sounds, no masses, no organomegaly.  Urogenital : no diaz, no suprapubic tenderness, no CVA tenderness  Extremities: No clubbing or cyanosis. No peripheral edema. Peripheral pulses palpable.  Neurologic: Alert and Cooperative , cranial nerves grossly intact, DTR equal, Power 5 /5   Psychology: No hallucination, no delusion, appropriate mood          Lab Review   Recent Results (from the past 24 hour(s))   POCT Glucose    Collection Time: 05/24/24  4:52 PM   Result Value Ref Range    POC Glucose 238  (H) 70 - 99 mg/dl    Performed on AccuChek    POCT Glucose    Collection Time: 05/24/24  8:27 PM   Result Value Ref Range    POC Glucose 275 (H) 70 - 99 mg/dl    Performed on AccuChek    CBC with Auto Differential    Collection Time: 05/25/24  1:31 AM   Result Value Ref Range    WBC 11.2 (H) 4.8 - 10.8 K/uL    RBC 4.53 4.20 - 5.40 M/uL    Hemoglobin 11.4 (L) 12.0 - 16.0 g/dL    Hematocrit 40.6 37.0 - 47.0 %    MCV 89.6 81.0 - 99.0 fL    MCH 25.2 (L) 27.0 - 31.0 pg    MCHC 28.1 (L) 33.0 - 37.0 g/dL    RDW 15.1 (H) 11.5 - 14.5 %    Platelets 261 130 - 400 K/uL    MPV 9.3 (L) 9.4 - 12.3 fL    PLATELET SLIDE REVIEW Adequate     Neutrophils % 68.9 (H) 50.0 - 65.0 %    Lymphocytes % 15.1 (L) 20.0 - 40.0 %    Monocytes % 7.7 0.0 - 10.0 %    Eosinophils % 6.6 (H) 0.0 - 5.0 %    Basophils % 1.0 0.0 - 1.0 %    Neutrophils Absolute 7.7 (H) 1.5 - 7.5 K/uL    Immature Granulocytes # 0.1 K/uL    Lymphocytes Absolute 1.7 1.1 - 4.5 K/uL    Monocytes Absolute 0.90 0.00 - 0.90 K/uL    Eosinophils Absolute 0.70 (H) 0.00 - 0.60 K/uL    Basophils Absolute 0.10 0.00 - 0.20 K/uL    Anisocytosis 1+ (A)     Hypochromia 2+ (A)    Basic Metabolic Panel w/ Reflex to MG    Collection Time: 05/25/24  1:31 AM   Result Value Ref Range    Sodium 130 (L) 136 - 145 mmol/L    Potassium reflex Magnesium 4.8 3.5 - 5.0 mmol/L    Chloride 95 (L) 98 - 111 mmol/L    CO2 24 22 - 29 mmol/L    Anion Gap 11 7 - 19 mmol/L    Glucose 199 (H) 74 - 109 mg/dL    BUN 36 (H) 8 - 23 mg/dL    Creatinine 0.9 0.5 - 0.9 mg/dL    Est, Glom Filt Rate 65 >60    Calcium 8.5 (L) 8.8 - 10.2 mg/dL   POCT Glucose    Collection Time: 05/25/24  8:00 AM   Result Value Ref Range    POC Glucose 218 (H) 70 - 99 mg/dl    Performed on AccuChek    POCT Glucose    Collection Time: 05/25/24 11:44 AM   Result Value Ref Range    POC Glucose 267 (H) 70 - 99 mg/dl    Performed on AccuChek         I/O last 3 completed shifts:  In: 120 [P.O.:120]  Out: 2050 [Urine:2050]     insulin glargine  15

## 2024-05-26 ENCOUNTER — APPOINTMENT (OUTPATIENT)
Dept: GENERAL RADIOLOGY | Age: 79
End: 2024-05-26
Attending: INTERNAL MEDICINE
Payer: MEDICARE

## 2024-05-26 LAB
ALBUMIN SERPL-MCNC: 2.4 G/DL (ref 3.5–5.2)
ALLENS TEST: ABNORMAL
ALP SERPL-CCNC: 156 U/L (ref 35–104)
ALT SERPL-CCNC: 28 U/L (ref 5–33)
ANION GAP SERPL CALCULATED.3IONS-SCNC: 10 MMOL/L (ref 7–19)
ANION GAP SERPL CALCULATED.3IONS-SCNC: 14 MMOL/L (ref 7–19)
AST SERPL-CCNC: 40 U/L (ref 5–32)
BASE EXCESS ARTERIAL: 8.2 MMOL/L (ref -2–2)
BASOPHILS # BLD: 0.1 K/UL (ref 0–0.2)
BASOPHILS NFR BLD: 0.7 % (ref 0–1)
BILIRUB SERPL-MCNC: 0.4 MG/DL (ref 0.2–1.2)
BUN SERPL-MCNC: 35 MG/DL (ref 8–23)
BUN SERPL-MCNC: 39 MG/DL (ref 8–23)
CALCIUM SERPL-MCNC: 7.8 MG/DL (ref 8.8–10.2)
CALCIUM SERPL-MCNC: 8.1 MG/DL (ref 8.8–10.2)
CARBOXYHEMOGLOBIN ARTERIAL: 1.9 % (ref 0–5)
CHLORIDE SERPL-SCNC: 95 MMOL/L (ref 98–111)
CHLORIDE SERPL-SCNC: 97 MMOL/L (ref 98–111)
CO2 SERPL-SCNC: 21 MMOL/L (ref 22–29)
CO2 SERPL-SCNC: 27 MMOL/L (ref 22–29)
CREAT SERPL-MCNC: 0.8 MG/DL (ref 0.5–0.9)
CREAT SERPL-MCNC: 0.9 MG/DL (ref 0.5–0.9)
DIGOXIN SERPL-MCNC: 1.1 NG/ML (ref 0.6–1.2)
EOSINOPHIL # BLD: 0.7 K/UL (ref 0–0.6)
EOSINOPHIL NFR BLD: 8.8 % (ref 0–5)
ERYTHROCYTE [DISTWIDTH] IN BLOOD BY AUTOMATED COUNT: 14.6 % (ref 11.5–14.5)
FIO2: 100 %
GLUCOSE BLD-MCNC: 193 MG/DL (ref 70–99)
GLUCOSE BLD-MCNC: 255 MG/DL (ref 70–99)
GLUCOSE BLD-MCNC: 290 MG/DL (ref 70–99)
GLUCOSE BLD-MCNC: 293 MG/DL (ref 70–99)
GLUCOSE BLD-MCNC: 309 MG/DL (ref 70–99)
GLUCOSE SERPL-MCNC: 222 MG/DL (ref 74–109)
GLUCOSE SERPL-MCNC: 282 MG/DL (ref 74–109)
HCO3 ARTERIAL: 31.9 MMOL/L (ref 22–26)
HCT VFR BLD AUTO: 34.7 % (ref 37–47)
HEMOGLOBIN, ART, EXTENDED: 10.7 G/DL (ref 12–16)
HGB BLD-MCNC: 10.5 G/DL (ref 12–16)
IMM GRANULOCYTES # BLD: 0.1 K/UL
LYMPHOCYTES # BLD: 1.3 K/UL (ref 1.1–4.5)
LYMPHOCYTES NFR BLD: 16.6 % (ref 20–40)
MAGNESIUM SERPL-MCNC: 1.8 MG/DL (ref 1.6–2.4)
MCH RBC QN AUTO: 25.2 PG (ref 27–31)
MCHC RBC AUTO-ENTMCNC: 30.3 G/DL (ref 33–37)
MCV RBC AUTO: 83.2 FL (ref 81–99)
MECHANICAL RATE IN BPM: 16
METHEMOGLOBIN ARTERIAL: 1.2 %
MODE: ABNORMAL
MONOCYTES # BLD: 0.6 K/UL (ref 0–0.9)
MONOCYTES NFR BLD: 7.8 % (ref 0–10)
NEUTROPHILS # BLD: 5.3 K/UL (ref 1.5–7.5)
NEUTS SEG NFR BLD: 65.4 % (ref 50–65)
O2 CONTENT ARTERIAL: 15.1 ML/DL
O2 SAT, ARTERIAL: 96.7 %
O2 THERAPY: ABNORMAL
PCO2 ARTERIAL: 40 MMHG (ref 35–45)
PERFORMED ON: ABNORMAL
PH ARTERIAL: 7.51 (ref 7.35–7.45)
PLATELET # BLD AUTO: 258 K/UL (ref 130–400)
PMV BLD AUTO: 9.1 FL (ref 9.4–12.3)
PO2 ARTERIAL: 232 MMHG (ref 80–100)
POSITIVE END EXP PRESS: 5
POTASSIUM BLD-SCNC: 4.7 MMOL/L
POTASSIUM SERPL-SCNC: 4 MMOL/L (ref 3.5–5)
POTASSIUM SERPL-SCNC: 5 MMOL/L (ref 3.5–5)
PROT SERPL-MCNC: 6.6 G/DL (ref 6.6–8.7)
RBC # BLD AUTO: 4.17 M/UL (ref 4.2–5.4)
SAMPLE SOURCE: ABNORMAL
SODIUM SERPL-SCNC: 130 MMOL/L (ref 136–145)
SODIUM SERPL-SCNC: 134 MMOL/L (ref 136–145)
VT MECHANICAL: 500 %
WBC # BLD AUTO: 8.1 K/UL (ref 4.8–10.8)

## 2024-05-26 PROCEDURE — 80162 ASSAY OF DIGOXIN TOTAL: CPT

## 2024-05-26 PROCEDURE — 6370000000 HC RX 637 (ALT 250 FOR IP): Performed by: INTERNAL MEDICINE

## 2024-05-26 PROCEDURE — 82962 GLUCOSE BLOOD TEST: CPT

## 2024-05-26 PROCEDURE — 6360000002 HC RX W HCPCS: Performed by: INTERNAL MEDICINE

## 2024-05-26 PROCEDURE — 2700000000 HC OXYGEN THERAPY PER DAY

## 2024-05-26 PROCEDURE — 94760 N-INVAS EAR/PLS OXIMETRY 1: CPT

## 2024-05-26 PROCEDURE — 85025 COMPLETE CBC W/AUTO DIFF WBC: CPT

## 2024-05-26 PROCEDURE — 6370000000 HC RX 637 (ALT 250 FOR IP): Performed by: PHYSICIAN ASSISTANT

## 2024-05-26 PROCEDURE — 36415 COLL VENOUS BLD VENIPUNCTURE: CPT

## 2024-05-26 PROCEDURE — 0DH67UZ INSERTION OF FEEDING DEVICE INTO STOMACH, VIA NATURAL OR ARTIFICIAL OPENING: ICD-10-PCS | Performed by: INTERNAL MEDICINE

## 2024-05-26 PROCEDURE — 0BH17EZ INSERTION OF ENDOTRACHEAL AIRWAY INTO TRACHEA, VIA NATURAL OR ARTIFICIAL OPENING: ICD-10-PCS | Performed by: INTERNAL MEDICINE

## 2024-05-26 PROCEDURE — 2500000003 HC RX 250 WO HCPCS: Performed by: INTERNAL MEDICINE

## 2024-05-26 PROCEDURE — 80053 COMPREHEN METABOLIC PANEL: CPT

## 2024-05-26 PROCEDURE — 2580000003 HC RX 258: Performed by: INTERNAL MEDICINE

## 2024-05-26 PROCEDURE — 97530 THERAPEUTIC ACTIVITIES: CPT

## 2024-05-26 PROCEDURE — 2000000000 HC ICU R&B

## 2024-05-26 PROCEDURE — 31500 INSERT EMERGENCY AIRWAY: CPT

## 2024-05-26 PROCEDURE — 71045 X-RAY EXAM CHEST 1 VIEW: CPT

## 2024-05-26 PROCEDURE — 6370000000 HC RX 637 (ALT 250 FOR IP): Performed by: STUDENT IN AN ORGANIZED HEALTH CARE EDUCATION/TRAINING PROGRAM

## 2024-05-26 PROCEDURE — 92950 HEART/LUNG RESUSCITATION CPR: CPT

## 2024-05-26 PROCEDURE — 94640 AIRWAY INHALATION TREATMENT: CPT

## 2024-05-26 PROCEDURE — 82803 BLOOD GASES ANY COMBINATION: CPT

## 2024-05-26 PROCEDURE — 83735 ASSAY OF MAGNESIUM: CPT

## 2024-05-26 PROCEDURE — 94002 VENT MGMT INPAT INIT DAY: CPT

## 2024-05-26 PROCEDURE — 36600 WITHDRAWAL OF ARTERIAL BLOOD: CPT

## 2024-05-26 PROCEDURE — 5A1945Z RESPIRATORY VENTILATION, 24-96 CONSECUTIVE HOURS: ICD-10-PCS | Performed by: INTERNAL MEDICINE

## 2024-05-26 RX ORDER — CHLORHEXIDINE GLUCONATE ORAL RINSE 1.2 MG/ML
15 SOLUTION DENTAL 2 TIMES DAILY
Status: DISCONTINUED | OUTPATIENT
Start: 2024-05-26 | End: 2024-05-31

## 2024-05-26 RX ORDER — 0.9 % SODIUM CHLORIDE 0.9 %
1000 INTRAVENOUS SOLUTION INTRAVENOUS ONCE
Status: DISCONTINUED | OUTPATIENT
Start: 2024-05-26 | End: 2024-05-29

## 2024-05-26 RX ORDER — LEVALBUTEROL INHALATION SOLUTION 0.63 MG/3ML
0.63 SOLUTION RESPIRATORY (INHALATION) EVERY 4 HOURS
Status: DISPENSED | OUTPATIENT
Start: 2024-05-26 | End: 2024-05-28

## 2024-05-26 RX ORDER — METOPROLOL SUCCINATE 50 MG/1
100 TABLET, EXTENDED RELEASE ORAL 2 TIMES DAILY
Status: DISCONTINUED | OUTPATIENT
Start: 2024-05-26 | End: 2024-05-31 | Stop reason: HOSPADM

## 2024-05-26 RX ORDER — METOPROLOL SUCCINATE 25 MG/1
25 TABLET, EXTENDED RELEASE ORAL ONCE
Status: COMPLETED | OUTPATIENT
Start: 2024-05-26 | End: 2024-05-26

## 2024-05-26 RX ORDER — PROPOFOL 10 MG/ML
5-50 INJECTION, EMULSION INTRAVENOUS CONTINUOUS
Status: DISCONTINUED | OUTPATIENT
Start: 2024-05-26 | End: 2024-05-30

## 2024-05-26 RX ADMIN — APIXABAN 5 MG: 5 TABLET, FILM COATED ORAL at 20:39

## 2024-05-26 RX ADMIN — ATORVASTATIN CALCIUM 20 MG: 20 TABLET, FILM COATED ORAL at 08:19

## 2024-05-26 RX ADMIN — METOPROLOL SUCCINATE 75 MG: 25 TABLET, FILM COATED, EXTENDED RELEASE ORAL at 08:18

## 2024-05-26 RX ADMIN — DIPHENHYDRAMINE HYDROCHLORIDE AND ZINC ACETATE: 10; 1 CREAM TOPICAL at 20:41

## 2024-05-26 RX ADMIN — METOPROLOL SUCCINATE 25 MG: 25 TABLET, EXTENDED RELEASE ORAL at 14:40

## 2024-05-26 RX ADMIN — LEVALBUTEROL HYDROCHLORIDE 0.63 MG: 0.63 SOLUTION RESPIRATORY (INHALATION) at 18:50

## 2024-05-26 RX ADMIN — IPRATROPIUM BROMIDE 2 PUFF: 17 AEROSOL, METERED RESPIRATORY (INHALATION) at 19:02

## 2024-05-26 RX ADMIN — SODIUM CHLORIDE, PRESERVATIVE FREE 20 MG: 5 INJECTION INTRAVENOUS at 20:39

## 2024-05-26 RX ADMIN — IPRATROPIUM BROMIDE 2 PUFF: 17 AEROSOL, METERED RESPIRATORY (INHALATION) at 10:24

## 2024-05-26 RX ADMIN — IPRATROPIUM BROMIDE 2 PUFF: 17 AEROSOL, METERED RESPIRATORY (INHALATION) at 14:09

## 2024-05-26 RX ADMIN — INSULIN LISPRO 6 UNITS: 100 INJECTION, SOLUTION INTRAVENOUS; SUBCUTANEOUS at 17:07

## 2024-05-26 RX ADMIN — IPRATROPIUM BROMIDE 2 PUFF: 17 AEROSOL, METERED RESPIRATORY (INHALATION) at 06:27

## 2024-05-26 RX ADMIN — 0.12% CHLORHEXIDINE GLUCONATE 15 ML: 1.2 RINSE ORAL at 20:39

## 2024-05-26 RX ADMIN — DONEPEZIL HYDROCHLORIDE 10 MG: 10 TABLET, FILM COATED ORAL at 20:39

## 2024-05-26 RX ADMIN — BUDESONIDE AND FORMOTEROL FUMARATE DIHYDRATE 2 PUFF: 160; 4.5 AEROSOL RESPIRATORY (INHALATION) at 19:02

## 2024-05-26 RX ADMIN — HYDROCORTISONE ACETATE: 1 CREAM TOPICAL at 08:17

## 2024-05-26 RX ADMIN — PSYLLIUM HUSK 1 PACKET: 3.4 POWDER ORAL at 20:39

## 2024-05-26 RX ADMIN — INSULIN GLARGINE 15 UNITS: 100 INJECTION, SOLUTION SUBCUTANEOUS at 08:18

## 2024-05-26 RX ADMIN — DEXTROSE MONOHYDRATE 150 MG: 50 INJECTION, SOLUTION INTRAVENOUS at 17:12

## 2024-05-26 RX ADMIN — PSYLLIUM HUSK 1 PACKET: 3.4 POWDER ORAL at 08:17

## 2024-05-26 RX ADMIN — LEVALBUTEROL HYDROCHLORIDE 0.63 MG: 0.63 SOLUTION RESPIRATORY (INHALATION) at 06:20

## 2024-05-26 RX ADMIN — PROPOFOL 25 MCG/KG/MIN: 10 INJECTION, EMULSION INTRAVENOUS at 18:06

## 2024-05-26 RX ADMIN — PROPOFOL 30 MCG/KG/MIN: 10 INJECTION, EMULSION INTRAVENOUS at 21:32

## 2024-05-26 RX ADMIN — LEVALBUTEROL HYDROCHLORIDE 0.63 MG: 0.63 SOLUTION RESPIRATORY (INHALATION) at 14:07

## 2024-05-26 RX ADMIN — WATER 80 MG: 1 INJECTION INTRAMUSCULAR; INTRAVENOUS; SUBCUTANEOUS at 20:46

## 2024-05-26 RX ADMIN — INSULIN LISPRO 4 UNITS: 100 INJECTION, SOLUTION INTRAVENOUS; SUBCUTANEOUS at 11:45

## 2024-05-26 RX ADMIN — METOPROLOL SUCCINATE 100 MG: 50 TABLET, EXTENDED RELEASE ORAL at 20:39

## 2024-05-26 RX ADMIN — DIGOXIN 250 MCG: 250 TABLET ORAL at 08:18

## 2024-05-26 RX ADMIN — DIPHENHYDRAMINE HYDROCHLORIDE AND ZINC ACETATE: 10; 1 CREAM TOPICAL at 08:17

## 2024-05-26 RX ADMIN — Medication 5000 UNITS: at 08:18

## 2024-05-26 RX ADMIN — HYDROCORTISONE ACETATE: 1 CREAM TOPICAL at 20:40

## 2024-05-26 RX ADMIN — APIXABAN 5 MG: 5 TABLET, FILM COATED ORAL at 08:18

## 2024-05-26 RX ADMIN — BUDESONIDE AND FORMOTEROL FUMARATE DIHYDRATE 2 PUFF: 160; 4.5 AEROSOL RESPIRATORY (INHALATION) at 06:27

## 2024-05-26 RX ADMIN — WATER 80 MG: 1 INJECTION INTRAMUSCULAR; INTRAVENOUS; SUBCUTANEOUS at 14:40

## 2024-05-26 RX ADMIN — LEVALBUTEROL HYDROCHLORIDE 0.63 MG: 0.63 SOLUTION RESPIRATORY (INHALATION) at 22:15

## 2024-05-26 RX ADMIN — Medication 15 G: at 08:18

## 2024-05-26 ASSESSMENT — PULMONARY FUNCTION TESTS
PIF_VALUE: 27
PIF_VALUE: 29
PIF_VALUE: 26
PIF_VALUE: 23
PIF_VALUE: 32
PIF_VALUE: 27
PIF_VALUE: 23
PIF_VALUE: 22

## 2024-05-26 ASSESSMENT — PAIN SCALES - GENERAL: PAINLEVEL_OUTOF10: 0

## 2024-05-26 NOTE — PROGRESS NOTES
PHYSICAL THERAPY  RE-ASSESSMENT  24    DATE:  2024  NAME:  Edie Cat  :  1945  (78 y.o.,female)  MRN:  [id number]      Subjective   Patient agrees to attempt to walk and sit in the recliner          PAIN    [x] No Pain    [] Patient rates pain at ___ / 10 - Nursing was notified    HOME LIVING:  Social/Functional History  Lives With: Family  Type of Home: House  Home Layout: One level  Home Access: Ramped entrance  Bathroom Shower/Tub: Walk-in shower  Bathroom Toilet: Standard  Bathroom Equipment: Grab bars in shower, Shower chair  Bathroom Accessibility: Accessible  Home Equipment: Cane, Walker, standard, Wheelchair-manual  Receives Help From: Family  ADL Assistance: Needs assistance  Ambulation Assistance: Needs assistance  Transfer Assistance: Needs assistance  Active : No  Occupation: Retired  Additional Comments: ABOVE INFO TAKEN FROM CM NOTES    RESTRICTIONS/PRECAUTIONS:    Restrictions/Precautions  Restrictions/Precautions: Isolation, Fall Risk    OVERALL  ORIENTATION STATUS:  Overall Orientation Status: Within Functional Limits    STRENGTH   Bilateral LE strength grossly 2+/5 to 3-/2       ROM    WFL        BALANCE  Balance  Sitting - Static: Fair  Sitting - Dynamic: Fair, -    BED MOBILITY  Bed Mobility  Rolling: Stand by assistance  Supine to Sit: Contact guard assistance  Sit to Supine: Minimal assistance  Scooting: Stand by assistance    TRANSFERS  Transfers  Sit to Stand: CGA to minimum assist for safety  Stand to Sit: Contact guard assistance  Bed to Chair: Minimal assistance  Comment: stood 1 min with fair- balance and RW with CGA x 1.  No knee buckling but theu appear unstable.    AMBULATION  Device: Rolling Walker  Assistance: Minimal assistance  Distance: TOOK STEPS FROM  BED to RECLINER    STAIRS       TREATMENT FOCUS THIS VISIT    [] Gait training  [x] Transfer training  [] Bed mobility  [] Lower extremity exercise  [x] Safety and education    COMMENTS:  Returned to chair

## 2024-05-26 NOTE — PROGRESS NOTES
Edie Cat arrived to room # 151.   Presented with: CARDIAC ARREST  Mental Status: Patient is  INTUBATED, ABLE TO FOLLOW COMMANDS .   Vitals:    05/26/24 1738   BP: (!) 140/124   Pulse: 85   Resp: 21   Temp: 96.8 °F (36 °C)   SpO2:      Patient safety contract and falls prevention contract reviewed with patient Yes.  Oriented Patient to room.  Call light within reach. Yes.  Needs, issues or concerns expressed at this time: no.      Electronically signed by Tila Jordan RN on 5/26/2024 at 5:54 PM

## 2024-05-26 NOTE — PROGRESS NOTES
4 Eyes Skin Assessment     NAME:  Edie Cat  YOB: 1945  MEDICAL RECORD NUMBER:  [id number]    The patient is being assessed for  Admission    I agree that at least one RN has performed a thorough Head to Toe Skin Assessment on the patient. ALL assessment sites listed below have been assessed.      Areas assessed by both nurses:    Head, Face, Ears, Shoulders, Back, Chest, Arms, Elbows, Hands, Sacrum. Buttock, Coccyx, Ischium, Legs. Feet and Heels, and Under Medical Devices         Does the Patient have a Wound? Yes wound(s) were present on assessment. LDA wound assessment was Initiated and completed by RN       Bryon Prevention initiated by RN: Yes  Wound Care Orders initiated by RN: No    Pressure Injury (Stage 3,4, Unstageable, DTI, NWPT, and Complex wounds) if present, place Wound referral order by RN under : No    New Ostomies, if present place, Ostomy referral order under : No     Nurse 1 eSignature: Electronically signed by Tila Jordan RN on 5/26/24 at 6:26 PM CDT    **SHARE this note so that the co-signing nurse can place an eSignature**    Nurse 2 eSignature: {Esignature:[id number]}

## 2024-05-26 NOTE — PROGRESS NOTES
CODE BLUE for cardiac arrest after giving the bolus dose of amiodarone  Status post CPR followed by 1 Epi and CPR with restoration of circulation  Still altered mental status need to be intubated and ventilated  Given 1 L of bolus normal saline  Transfer to ICU  Stable vitals however continue on A-fib    Short summary  Ms. 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 Howard ablation and pacemaker    POA being daughter given consent for pacemaker

## 2024-05-26 NOTE — PLAN OF CARE
Problem: Discharge Planning  Goal: Discharge to home or other facility with appropriate resources  5/26/2024 1055 by Roseann Dixon RN  Outcome: Progressing  5/25/2024 2231 by Fadia Viera RN  Outcome: Progressing     Problem: Safety - Adult  Goal: Free from fall injury  5/26/2024 1055 by Roseann Dixon RN  Outcome: Progressing  5/25/2024 2231 by Fadia Viera RN  Outcome: Progressing     Problem: ABCDS Injury Assessment  Goal: Absence of physical injury  5/26/2024 1055 by Roseann Dixon RN  Outcome: Progressing  5/25/2024 2231 by Fadia Viera RN  Outcome: Progressing     Problem: Skin/Tissue Integrity  Goal: Absence of new skin breakdown  Description:  Monitor for areas of redness and/or skin breakdown    5/26/2024 1055 by Roseann Dixon RN  Outcome: Progressing  5/25/2024 2231 by Fadia Viera RN  Outcome: Progressing     Problem: Pain  Goal: Verbalizes/displays adequate comfort level or baseline comfort level  5/26/2024 1055 by Roseann Dixon RN  Outcome: Progressing  5/25/2024 2231 by Fadia Viera RN  Outcome: Progressing     Problem: Chronic Conditions and Co-morbidities  Goal: Patient's chronic conditions and co-morbidity symptoms are monitored and maintained or improved  5/26/2024 1055 by Roseann Dixon RN  Outcome: Progressing  5/25/2024 2231 by Fadia Viera RN  Outcome: Progressing     Problem: Neurosensory - Adult  Goal: Achieves stable or improved neurological status  5/26/2024 1055 by Roseann Dixon RN  Outcome: Progressing  5/25/2024 2231 by Fadia Viera RN  Outcome: Progressing     Problem: Respiratory - Adult  Goal: Achieves optimal ventilation and oxygenation  5/26/2024 1055 by Roseann Dixon RN  Outcome: Progressing  5/25/2024 2231 by Fadia Viera RN  Outcome: Progressing     Problem: Cardiovascular - Adult  Goal: Maintains optimal cardiac output and hemodynamic stability  5/26/2024 1055 by Roseann Dixon RN  Outcome: Progressing  5/25/2024 2231 by Maximiliano

## 2024-05-26 NOTE — PLAN OF CARE
Problem: Discharge Planning  Goal: Discharge to home or other facility with appropriate resources  Outcome: Progressing     Problem: Safety - Adult  Goal: Free from fall injury  Outcome: Progressing     Problem: ABCDS Injury Assessment  Goal: Absence of physical injury  Outcome: Progressing     Problem: Skin/Tissue Integrity  Goal: Absence of new skin breakdown  Description:  Monitor for areas of redness and/or skin breakdown    Outcome: Progressing     Problem: Pain  Goal: Verbalizes/displays adequate comfort level or baseline comfort level  Outcome: Progressing     Problem: Chronic Conditions and Co-morbidities  Goal: Patient's chronic conditions and co-morbidity symptoms are monitored and maintained or improved  Outcome: Progressing     Problem: Neurosensory - Adult  Goal: Achieves stable or improved neurological status  Outcome: Progressing     Problem: Respiratory - Adult  Goal: Achieves optimal ventilation and oxygenation  Outcome: Progressing     Problem: Cardiovascular - Adult  Goal: Maintains optimal cardiac output and hemodynamic stability  Outcome: Progressing     Problem: Skin/Tissue Integrity - Adult  Goal: Skin integrity remains intact  Outcome: Progressing     Problem: Musculoskeletal - Adult  Goal: Return mobility to safest level of function  Outcome: Progressing     Problem: Gastrointestinal - Adult  Goal: Minimal or absence of nausea and vomiting  Outcome: Progressing     Problem: Genitourinary - Adult  Goal: Absence of urinary retention  Outcome: Progressing     Problem: Infection - Adult  Goal: Absence of infection at discharge  Outcome: Progressing     Problem: Metabolic/Fluid and Electrolytes - Adult  Goal: Electrolytes maintained within normal limits  Outcome: Progressing     Problem: Anxiety  Goal: Will report anxiety at manageable levels  Description: INTERVENTIONS:  1. Administer medication as ordered  2. Teach and rehearse alternative coping skills  3. Provide emotional support with 1:1  Called patient to follow up on blood pressure  Nurse visit scheduled 8-29-22       patient/family  5. Respect patient/family right to receive or not to receive information  6. Serve as a liaison between patient and family and health care team  7. Initiate Consults from Ethics, Palliative Care or initiate Family Care Conference as is appropriate  Outcome: Progressing     Problem: Confusion  Goal: Confusion, delirium, dementia, or psychosis is improved or at baseline  Description: INTERVENTIONS:  1. Assess for possible contributors to thought disturbance, including medications, impaired vision or hearing, underlying metabolic abnormalities, dehydration, psychiatric diagnoses, and notify attending LIP  2. [place] high risk fall precautions, as indicated  3. Provide frequent short contacts to provide reality reorientation, refocusing and direction  4. Decrease environmental stimuli, including noise as appropriate  5. Monitor and intervene to maintain adequate nutrition, hydration, elimination, sleep and activity  6. If unable to ensure safety without constant attention obtain sitter and review sitter guidelines with assigned personnel  7. Initiate Psychosocial CNS and Spiritual Care consult, as indicated  Outcome: Progressing     Problem: Nutrition Deficit:  Goal: Optimize nutritional status  Outcome: Progressing

## 2024-05-26 NOTE — PROGRESS NOTES
rate  EP cardiology follow-up plan for ablation/pacer  5/26 increased heart rate to 140  Discussed with pharmacy  as the creatinine clearance is not adequate  obtain digoxin level which is 1.1> continue on same dose of digoxin 250 mcg daily  Increase Toprol-XL from 75 mg to 100 mg p.o. 3 times daily  Also started on amiodarone IV bolus followed by drip     COPD exacerbation, resolved  COVID 19 infection  -- Completed course of IV steroids and Azithro  -- Continue ICS/LABA/LAMA  -- PRN albuterol  5/26 repeat exacerbation of COPD  Started on Solu-Medrol 80 mg every 8 hour> will monitor and wean off  Increase Xopenex from every 8 hours to every 4 hours     Hypoxia  -- Possible fluid overload versus atelectasis from deconditioning  -- Check CXR  -- Stop IVF  5/23 presently on 2 L of oxygen by nasal cannula  5/25 encourage out of bed to chair and continue on incentive spirometry presently in room air       Diffuse rash could be exfoliative dermatitis  -- Likely drug reaction vs COVID induced  -- Improving, starting to peel  -- PRN PO Benadryl  -- Topical Benadryl  -- PO prednisone  5/23 presently resolving with hydrocortisone and Benadryl cream  5/25 that rash looking much better with peeling of skin  in most of the places    TAJ on CKD  -- Prerenal due to increased insensible losses from diffuse rash  -- Renal function improving with IVF  -- Stop IVF, renal function better but O2 requirements increasing  -- Daily BMP     SIADH  -- Na improved with additional of urea packet  -- Fluid restriction 1 L  -- Continue Urea packet daily  Sodium remains around 130     T2DM  Steroid induced hyperglycemia, resolved  -- Now off steroids  -- Decrease Lantus to 10 units QHS  -- Decrease SSI to low  5/25 blood sugar more than 200 increase Lantus to 15 units at daytime  Since the patient oral intake improved  Will change SSI to medium dose     Asymptomatic bacteriuria  Urine culture growing Proteus  Leukocytosis is resolving,  afebrile  No further treatment    Deconditioning  A-fib likely refractory to the present management  Consult palliative care for evaluation of CODE STATUS    Hyperkalemia 5.6  5/24  dose of Kayexalate 15 g will recheck the potassium later in the day>4.8    Constipation likely due to slow colonic transit  5/25 no bowel movement over the last 3 days  Started on Metamucil twice a day    DVT prophylaxis: Eliquis    POA daughter  Full Code   Case d/w the RN taking care of the patient  RN  notes reviewed  Consultant notes reviewed     DISPOSITION:    D/C: Skilled Facility  Estimated D/C Date: TBD        EMR Dragon/Transcription disclaimer:     This dictation was performed using Dragon software.  Mistake and misspelling may have been created without  realizing them, although attempts have made to review the note for such errors.  Please notify me for clarification.  Thank you    Part of the note is copied forward from previous provider.

## 2024-05-26 NOTE — PROGRESS NOTES
This nurse initiated amio drip as ordered by POWER Cavazos before initiating bolus. Almost immediately after starting bolus, pt became panicked and hyperventilating, pt then loss consciousness and pulse. Amio bolus stopped. Code blue called and CPR started, bag mask performed. Dr. Madsen responded to code and normal saline bolus ordered. 1mg of epi administered. Pt became responsive and ROSC achieved. Pt intubated and transferred to ICU. HR afib 130's. This nurse called and notified Daughter Lulú. Electronically signed by Roseann Dixon RN on 5/26/2024 at 6:13 PM

## 2024-05-27 PROBLEM — E44.1 MILD MALNUTRITION (HCC): Status: ACTIVE | Noted: 2024-05-27

## 2024-05-27 LAB
GLUCOSE BLD-MCNC: 318 MG/DL (ref 70–99)
GLUCOSE BLD-MCNC: 353 MG/DL (ref 70–99)
GLUCOSE BLD-MCNC: 393 MG/DL (ref 70–99)
GLUCOSE BLD-MCNC: 401 MG/DL (ref 70–99)
PERFORMED ON: ABNORMAL

## 2024-05-27 PROCEDURE — 2500000003 HC RX 250 WO HCPCS: Performed by: INTERNAL MEDICINE

## 2024-05-27 PROCEDURE — 6370000000 HC RX 637 (ALT 250 FOR IP): Performed by: PHYSICIAN ASSISTANT

## 2024-05-27 PROCEDURE — 2000000000 HC ICU R&B

## 2024-05-27 PROCEDURE — 6370000000 HC RX 637 (ALT 250 FOR IP): Performed by: INTERNAL MEDICINE

## 2024-05-27 PROCEDURE — 2580000003 HC RX 258: Performed by: INTERNAL MEDICINE

## 2024-05-27 PROCEDURE — 6360000002 HC RX W HCPCS: Performed by: INTERNAL MEDICINE

## 2024-05-27 PROCEDURE — 82962 GLUCOSE BLOOD TEST: CPT

## 2024-05-27 PROCEDURE — 94003 VENT MGMT INPAT SUBQ DAY: CPT

## 2024-05-27 PROCEDURE — 2700000000 HC OXYGEN THERAPY PER DAY

## 2024-05-27 PROCEDURE — 94760 N-INVAS EAR/PLS OXIMETRY 1: CPT

## 2024-05-27 PROCEDURE — 6370000000 HC RX 637 (ALT 250 FOR IP): Performed by: STUDENT IN AN ORGANIZED HEALTH CARE EDUCATION/TRAINING PROGRAM

## 2024-05-27 PROCEDURE — 94640 AIRWAY INHALATION TREATMENT: CPT

## 2024-05-27 RX ORDER — INSULIN LISPRO 100 [IU]/ML
0-16 INJECTION, SOLUTION INTRAVENOUS; SUBCUTANEOUS
Status: DISCONTINUED | OUTPATIENT
Start: 2024-05-27 | End: 2024-05-31 | Stop reason: HOSPADM

## 2024-05-27 RX ORDER — INSULIN LISPRO 100 [IU]/ML
0-4 INJECTION, SOLUTION INTRAVENOUS; SUBCUTANEOUS NIGHTLY
Status: DISCONTINUED | OUTPATIENT
Start: 2024-05-27 | End: 2024-05-31 | Stop reason: HOSPADM

## 2024-05-27 RX ORDER — DEXMEDETOMIDINE HYDROCHLORIDE 4 UG/ML
.1-1.5 INJECTION, SOLUTION INTRAVENOUS CONTINUOUS
Status: DISCONTINUED | OUTPATIENT
Start: 2024-05-27 | End: 2024-05-30

## 2024-05-27 RX ORDER — VERAPAMIL HYDROCHLORIDE 80 MG/1
80 TABLET ORAL EVERY 8 HOURS SCHEDULED
Status: DISCONTINUED | OUTPATIENT
Start: 2024-05-27 | End: 2024-05-31 | Stop reason: HOSPADM

## 2024-05-27 RX ADMIN — METOPROLOL SUCCINATE 100 MG: 50 TABLET, EXTENDED RELEASE ORAL at 20:27

## 2024-05-27 RX ADMIN — DIGOXIN 250 MCG: 250 TABLET ORAL at 08:34

## 2024-05-27 RX ADMIN — DIPHENHYDRAMINE HYDROCHLORIDE AND ZINC ACETATE: 10; 1 CREAM TOPICAL at 11:30

## 2024-05-27 RX ADMIN — LEVALBUTEROL HYDROCHLORIDE 0.63 MG: 0.63 SOLUTION RESPIRATORY (INHALATION) at 14:52

## 2024-05-27 RX ADMIN — DONEPEZIL HYDROCHLORIDE 10 MG: 10 TABLET, FILM COATED ORAL at 20:27

## 2024-05-27 RX ADMIN — WATER 80 MG: 1 INJECTION INTRAMUSCULAR; INTRAVENOUS; SUBCUTANEOUS at 22:08

## 2024-05-27 RX ADMIN — SODIUM CHLORIDE, PRESERVATIVE FREE 20 MG: 5 INJECTION INTRAVENOUS at 20:27

## 2024-05-27 RX ADMIN — DIPHENHYDRAMINE HYDROCHLORIDE AND ZINC ACETATE: 10; 1 CREAM TOPICAL at 20:28

## 2024-05-27 RX ADMIN — 0.12% CHLORHEXIDINE GLUCONATE 15 ML: 1.2 RINSE ORAL at 08:33

## 2024-05-27 RX ADMIN — IPRATROPIUM BROMIDE 2 PUFF: 17 AEROSOL, METERED RESPIRATORY (INHALATION) at 14:58

## 2024-05-27 RX ADMIN — Medication 15 G: at 08:33

## 2024-05-27 RX ADMIN — INSULIN LISPRO 16 UNITS: 100 INJECTION, SOLUTION INTRAVENOUS; SUBCUTANEOUS at 11:29

## 2024-05-27 RX ADMIN — MICONAZOLE NITRATE: 2 POWDER TOPICAL at 20:29

## 2024-05-27 RX ADMIN — SODIUM CHLORIDE, PRESERVATIVE FREE 20 MG: 5 INJECTION INTRAVENOUS at 08:33

## 2024-05-27 RX ADMIN — INSULIN LISPRO 16 UNITS: 100 INJECTION, SOLUTION INTRAVENOUS; SUBCUTANEOUS at 16:40

## 2024-05-27 RX ADMIN — HYDROCORTISONE ACETATE: 1 CREAM TOPICAL at 09:02

## 2024-05-27 RX ADMIN — LEVALBUTEROL HYDROCHLORIDE 0.63 MG: 0.63 SOLUTION RESPIRATORY (INHALATION) at 02:06

## 2024-05-27 RX ADMIN — Medication 5000 UNITS: at 08:34

## 2024-05-27 RX ADMIN — BUDESONIDE AND FORMOTEROL FUMARATE DIHYDRATE 2 PUFF: 160; 4.5 AEROSOL RESPIRATORY (INHALATION) at 06:36

## 2024-05-27 RX ADMIN — PROPOFOL 25 MCG/KG/MIN: 10 INJECTION, EMULSION INTRAVENOUS at 10:51

## 2024-05-27 RX ADMIN — MICONAZOLE NITRATE: 2 POWDER TOPICAL at 09:02

## 2024-05-27 RX ADMIN — IPRATROPIUM BROMIDE 2 PUFF: 17 AEROSOL, METERED RESPIRATORY (INHALATION) at 19:15

## 2024-05-27 RX ADMIN — HYDROCORTISONE ACETATE: 1 CREAM TOPICAL at 20:28

## 2024-05-27 RX ADMIN — LEVALBUTEROL HYDROCHLORIDE 0.63 MG: 0.63 SOLUTION RESPIRATORY (INHALATION) at 06:32

## 2024-05-27 RX ADMIN — DEXMEDETOMIDINE HYDROCHLORIDE 0.6 MCG/KG/HR: 400 INJECTION, SOLUTION INTRAVENOUS at 19:06

## 2024-05-27 RX ADMIN — BUDESONIDE AND FORMOTEROL FUMARATE DIHYDRATE 2 PUFF: 160; 4.5 AEROSOL RESPIRATORY (INHALATION) at 19:14

## 2024-05-27 RX ADMIN — INSULIN GLARGINE 15 UNITS: 100 INJECTION, SOLUTION SUBCUTANEOUS at 08:50

## 2024-05-27 RX ADMIN — DEXMEDETOMIDINE HYDROCHLORIDE 0.2 MCG/KG/HR: 400 INJECTION, SOLUTION INTRAVENOUS at 10:56

## 2024-05-27 RX ADMIN — 0.12% CHLORHEXIDINE GLUCONATE 15 ML: 1.2 RINSE ORAL at 20:27

## 2024-05-27 RX ADMIN — WATER 80 MG: 1 INJECTION INTRAMUSCULAR; INTRAVENOUS; SUBCUTANEOUS at 14:29

## 2024-05-27 RX ADMIN — APIXABAN 5 MG: 5 TABLET, FILM COATED ORAL at 08:34

## 2024-05-27 RX ADMIN — APIXABAN 5 MG: 5 TABLET, FILM COATED ORAL at 20:27

## 2024-05-27 RX ADMIN — WATER 80 MG: 1 INJECTION INTRAMUSCULAR; INTRAVENOUS; SUBCUTANEOUS at 06:01

## 2024-05-27 RX ADMIN — PSYLLIUM HUSK 1 PACKET: 3.4 POWDER ORAL at 08:33

## 2024-05-27 RX ADMIN — IPRATROPIUM BROMIDE 2 PUFF: 17 AEROSOL, METERED RESPIRATORY (INHALATION) at 06:36

## 2024-05-27 RX ADMIN — LEVALBUTEROL HYDROCHLORIDE 0.63 MG: 0.63 SOLUTION RESPIRATORY (INHALATION) at 19:16

## 2024-05-27 RX ADMIN — INSULIN LISPRO 4 UNITS: 100 INJECTION, SOLUTION INTRAVENOUS; SUBCUTANEOUS at 20:28

## 2024-05-27 RX ADMIN — METOPROLOL SUCCINATE 100 MG: 50 TABLET, EXTENDED RELEASE ORAL at 08:33

## 2024-05-27 RX ADMIN — PSYLLIUM HUSK 1 PACKET: 3.4 POWDER ORAL at 20:27

## 2024-05-27 RX ADMIN — VERAPAMIL HYDROCHLORIDE 80 MG: 80 TABLET ORAL at 20:27

## 2024-05-27 RX ADMIN — VERAPAMIL HYDROCHLORIDE 80 MG: 80 TABLET ORAL at 15:44

## 2024-05-27 RX ADMIN — DIPHENHYDRAMINE HYDROCHLORIDE AND ZINC ACETATE: 10; 1 CREAM TOPICAL at 09:02

## 2024-05-27 RX ADMIN — INSULIN LISPRO 8 UNITS: 100 INJECTION, SOLUTION INTRAVENOUS; SUBCUTANEOUS at 08:33

## 2024-05-27 RX ADMIN — LEVALBUTEROL HYDROCHLORIDE 0.63 MG: 0.63 SOLUTION RESPIRATORY (INHALATION) at 22:39

## 2024-05-27 RX ADMIN — DIPHENHYDRAMINE HYDROCHLORIDE AND ZINC ACETATE: 10; 1 CREAM TOPICAL at 16:43

## 2024-05-27 RX ADMIN — ATORVASTATIN CALCIUM 20 MG: 20 TABLET, FILM COATED ORAL at 08:33

## 2024-05-27 RX ADMIN — PROPOFOL 30 MCG/KG/MIN: 10 INJECTION, EMULSION INTRAVENOUS at 06:06

## 2024-05-27 ASSESSMENT — PULMONARY FUNCTION TESTS
PIF_VALUE: 21
PIF_VALUE: 22
PIF_VALUE: 23
PIF_VALUE: 22
PIF_VALUE: 25
PIF_VALUE: 22
PIF_VALUE: 23
PIF_VALUE: 22
PIF_VALUE: 23
PIF_VALUE: 23
PIF_VALUE: 22
PIF_VALUE: 23
PIF_VALUE: 21
PIF_VALUE: 22
PIF_VALUE: 23
PIF_VALUE: 23
PIF_VALUE: 22
PIF_VALUE: 23
PIF_VALUE: 22
PIF_VALUE: 25
PIF_VALUE: 22
PIF_VALUE: 21
PIF_VALUE: 22
PIF_VALUE: 25
PIF_VALUE: 21
PIF_VALUE: 23

## 2024-05-27 ASSESSMENT — PAIN SCALES - GENERAL
PAINLEVEL_OUTOF10: 0
PAINLEVEL_OUTOF10: 0

## 2024-05-27 NOTE — PLAN OF CARE
health status with consideration of family and cultural values  4. Communicate willingness to discuss loss and facilitate grief process with patient/family as appropriate  5. Emphasize sustaining relationships within family system and community, or jeff/spiritual traditions  6. Initiate Spiritual Care, Psychosocial Clinical Specialist consult as needed  Outcome: Progressing  Flowsheets (Taken 5/26/2024 2000 by Ann Redmond, RN)  Patient/family communicate acceptance of loss or change in body image and feel psychological comfort and peace: Assess patient/family anxiety and grief process related to change in body image, loss of functional status, loss of sense of self, and forgiveness     Problem: Decision Making  Goal: Pt/Family able to effectively weigh alternatives and participate in decision making related to treatment and care  Description: INTERVENTIONS:  1. Determine when there are differences between patient's view, family's view, and healthcare provider's view of condition  2. Facilitate patient and family articulation of goals for care  3. Help patient and family identify pros/cons of alternative solutions  4. Provide information as requested by patient/family  5. Respect patient/family right to receive or not to receive information  6. Serve as a liaison between patient and family and health care team  7. Initiate Consults from Ethics, Palliative Care or initiate Family Care Conference as is appropriate  Outcome: Progressing  Flowsheets (Taken 5/26/2024 2000 by Ann Redmond, RN)  Patient/family able to effectively weigh alternatives and participate in decision making related to treatment and care: Determine when there are differences between patient's view, family's view, and healthcare provider's view of condition     Problem: Confusion  Goal: Confusion, delirium, dementia, or psychosis is improved or at baseline  Description: INTERVENTIONS:  1. Assess for possible contributors to thought

## 2024-05-27 NOTE — PROGRESS NOTES
Hospitalist Progress Note    Patient:  Edie Cat  YOB: 1945  Date of Service: 5/27/2024  MRN: [id number]   Acct: [id number]   Primary Care Physician: Dudley Eduardo  Advance Directive: Full Code  Admit Date: 5/2/2024       Hospital Day: 25  Referring Provider: Darwin Ledesma DO    Patient Seen, Chart, Consults, Notes, Labs, Radiology studies reviewed.    Subjective:  Edie Cat is a 78 y.o. female  whom we are following for distant COVID-19 infection, atrial fibrillation with rapid ventricular response, hypoxemic respiratory failure.  She developed a brief cardiac arrest yesterday afternoon.  She developed atrial fibrillation with rapid ventricular response.  She was started on amiodarone infusion.  As soon as she began receiving the drug, her heart rate dropped into the 30s and she lost a pulse.  A CODE BLUE was called.  She received some epinephrine and had an immediate ROSC.  Her airway protection was marginal.  We proceeded with intubation and she was transferred to the ICU.  She remains in atrial fibrillation.  Cardiology is following.  We attempted a weaning trial this morning however she developed significant tachypnea.  She is on assist-control rate of 16, tidal volume of 500, 5 of PEEP, FiO2 of 0.4.    Allergies:  Cardizem [diltiazem hcl] and Amoxicillin    Medicines:  Current Facility-Administered Medications   Medication Dose Route Frequency Provider Last Rate Last Admin    dexmedeTOMIDine (PRECEDEX) 400 mcg in sodium chloride 0.9 % 100 mL infusion  0.1-1.5 mcg/kg/hr IntraVENous Continuous Darwin Ledesma DO 9.5 mL/hr at 05/27/24 1315 0.4 mcg/kg/hr at 05/27/24 1315    insulin lispro (HUMALOG,ADMELOG) injection vial 0-16 Units  0-16 Units SubCUTAneous TID WC Darwin Ledesma DO   16 Units at 05/27/24 1129    insulin lispro (HUMALOG,ADMELOG) injection vial 0-4 Units  0-4 Units SubCUTAneous Nightly Darwin Ledesma DO        metoprolol succinate (TOPROL XL)

## 2024-05-27 NOTE — CONSULTS
Comprehensive Nutrition Assessment    Type and Reason for Visit:  Consult    Nutrition Recommendations/Plan:   Initiate EN: Vital High Protein at 30 mL/hr. Flush tube with Proteinex BID. Flush tube with 25 mL/H2O every hr. This regimen (along with current Propofol rate of 16.8 mL/hr) will provide 1371 kcals, 114 g/protein, 79 g/CHO, 601 mL free water from formula, and 600 mL/H2O from flushes.  Add Mild Malnutrition to the Problem List.     Malnutrition Assessment:  Malnutrition Status:  Mild malnutrition (05/27/24 0726)    Context:  Acute Illness     Findings of the 6 clinical characteristics of malnutrition:  Energy Intake:  Mild decrease in energy intake (Comment)  Weight Loss:  No significant weight loss     Body Fat Loss:  No significant body fat loss     Muscle Mass Loss:  No significant muscle mass loss    Fluid Accumulation:  Moderate to Severe Extremities   Strength:  Not Performed    Nutrition Assessment:    Pt is mechanically ventilated and NPO. OGT is clamped. Propofol currently provides 443.52 non-protein kcals/day. Blood glucose levels are ranging from 282-293. Will initiate EN and monitor tolerance closely.    Nutrition Related Findings:    +1 generalized edema. +2 pitting BLE edema.         Current Nutrition Intake & Therapies:    Average Meal Intake: NPO  Average Supplements Intake: NPO  Diet NPO    Anthropometric Measures:  Height: 157.5 cm (5' 2\")  Ideal Body Weight (IBW): 110 lbs (50 kg)    Current Body Weight: 94.7 kg (208 lb 12.4 oz), 190 % IBW.    Current BMI (kg/m2): 38.2  Usual Body Weight: 82.6 kg (182 lb 1.6 oz) (7/3/23)  % Weight Change (Calculated): 14.8  BMI Categories: Obese Class 2 (BMI 35.0 -39.9)    Estimated Daily Nutrient Needs:  Energy Requirements Based On: Kcal/kg  Weight Used for Energy Requirements: Current  Energy (kcal/day): 2473-5876 kcals/day  Weight Used for Protein Requirements: Ideal  Protein (g/day): 100 g/protein/day  Method Used for Fluid Requirements: 1

## 2024-05-27 NOTE — PROGRESS NOTES
Propofol was tuned off at 1000 to attempt SBT.  Patient was able to follow commands and SBT was started at 1030.  Within 10 minutes SBI went into 120s and patient became very agitated.  Patient was switched back to A/C and propofol restarted at 20.  Patient is currently calm with eyes closed.    Electronically signed by Tila Jordan RN on 5/27/2024 at 11:08 AM

## 2024-05-27 NOTE — PROGRESS NOTES
Patient's daughter took patient personal belongings home.    Electronically signed by Tila Jordan RN on 5/27/2024 at 3:57 PM

## 2024-05-28 ENCOUNTER — APPOINTMENT (OUTPATIENT)
Dept: GENERAL RADIOLOGY | Age: 79
End: 2024-05-28
Attending: INTERNAL MEDICINE
Payer: MEDICARE

## 2024-05-28 LAB
ALLENS TEST: ABNORMAL
ANION GAP SERPL CALCULATED.3IONS-SCNC: 14 MMOL/L (ref 7–19)
BASE EXCESS ARTERIAL: 1.4 MMOL/L (ref -2–2)
BASOPHILS # BLD: 0 K/UL (ref 0–0.2)
BASOPHILS NFR BLD: 0.1 % (ref 0–1)
BUN SERPL-MCNC: 54 MG/DL (ref 8–23)
CALCIUM SERPL-MCNC: 8.5 MG/DL (ref 8.8–10.2)
CARBOXYHEMOGLOBIN ARTERIAL: 1.7 % (ref 0–5)
CHLORIDE SERPL-SCNC: 97 MMOL/L (ref 98–111)
CO2 SERPL-SCNC: 21 MMOL/L (ref 22–29)
CREAT SERPL-MCNC: 1 MG/DL (ref 0.5–0.9)
DIGOXIN SERPL-MCNC: 2.1 NG/ML (ref 0.6–1.2)
EOSINOPHIL # BLD: 0 K/UL (ref 0–0.6)
EOSINOPHIL NFR BLD: 0 % (ref 0–5)
ERYTHROCYTE [DISTWIDTH] IN BLOOD BY AUTOMATED COUNT: 14.6 % (ref 11.5–14.5)
FIO2: 35 %
GLUCOSE BLD-MCNC: 338 MG/DL (ref 70–99)
GLUCOSE BLD-MCNC: 339 MG/DL (ref 70–99)
GLUCOSE BLD-MCNC: 343 MG/DL (ref 70–99)
GLUCOSE BLD-MCNC: 362 MG/DL (ref 70–99)
GLUCOSE BLD-MCNC: 392 MG/DL (ref 70–99)
GLUCOSE BLD-MCNC: 399 MG/DL (ref 70–99)
GLUCOSE BLD-MCNC: 422 MG/DL (ref 70–99)
GLUCOSE SERPL-MCNC: 339 MG/DL (ref 74–109)
HCO3 ARTERIAL: 24.4 MMOL/L (ref 22–26)
HCT VFR BLD AUTO: 31 % (ref 37–47)
HEMOGLOBIN, ART, EXTENDED: 10.5 G/DL (ref 12–16)
HGB BLD-MCNC: 10.3 G/DL (ref 12–16)
IMM GRANULOCYTES # BLD: 0.1 K/UL
LYMPHOCYTES # BLD: 1.1 K/UL (ref 1.1–4.5)
LYMPHOCYTES NFR BLD: 9.6 % (ref 20–40)
MCH RBC QN AUTO: 25.2 PG (ref 27–31)
MCHC RBC AUTO-ENTMCNC: 33.2 G/DL (ref 33–37)
MCV RBC AUTO: 75.8 FL (ref 81–99)
MECHANICAL RATE IN BPM: 16
METHEMOGLOBIN ARTERIAL: 1.2 %
MODE: ABNORMAL
MONOCYTES # BLD: 0.3 K/UL (ref 0–0.9)
MONOCYTES NFR BLD: 2.5 % (ref 0–10)
NEUTROPHILS # BLD: 10.1 K/UL (ref 1.5–7.5)
NEUTS SEG NFR BLD: 86.7 % (ref 50–65)
O2 CONTENT ARTERIAL: 13.9 ML/DL
O2 SAT, ARTERIAL: 93.8 %
O2 THERAPY: ABNORMAL
PCO2 ARTERIAL: 32 MMHG (ref 35–45)
PERFORMED ON: ABNORMAL
PH ARTERIAL: 7.49 (ref 7.35–7.45)
PLATELET # BLD AUTO: 278 K/UL (ref 130–400)
PMV BLD AUTO: 10.4 FL (ref 9.4–12.3)
PO2 ARTERIAL: 72 MMHG (ref 80–100)
POSITIVE END EXP PRESS: 5
POTASSIUM BLD-SCNC: 3.9 MMOL/L
POTASSIUM SERPL-SCNC: 4.4 MMOL/L (ref 3.5–5)
POTASSIUM SERPL-SCNC: 6 MMOL/L (ref 3.5–5)
POTASSIUM SERPL-SCNC: 6.6 MMOL/L (ref 3.5–5)
RBC # BLD AUTO: 4.09 M/UL (ref 4.2–5.4)
SAMPLE SOURCE: ABNORMAL
SODIUM SERPL-SCNC: 132 MMOL/L (ref 136–145)
VT MECHANICAL: 500 %
WBC # BLD AUTO: 11.6 K/UL (ref 4.8–10.8)

## 2024-05-28 PROCEDURE — 94640 AIRWAY INHALATION TREATMENT: CPT

## 2024-05-28 PROCEDURE — 2000000000 HC ICU R&B

## 2024-05-28 PROCEDURE — 2500000003 HC RX 250 WO HCPCS: Performed by: INTERNAL MEDICINE

## 2024-05-28 PROCEDURE — 6370000000 HC RX 637 (ALT 250 FOR IP): Performed by: INTERNAL MEDICINE

## 2024-05-28 PROCEDURE — 82803 BLOOD GASES ANY COMBINATION: CPT

## 2024-05-28 PROCEDURE — 6370000000 HC RX 637 (ALT 250 FOR IP): Performed by: PHYSICIAN ASSISTANT

## 2024-05-28 PROCEDURE — 80048 BASIC METABOLIC PNL TOTAL CA: CPT

## 2024-05-28 PROCEDURE — 82962 GLUCOSE BLOOD TEST: CPT

## 2024-05-28 PROCEDURE — 99233 SBSQ HOSP IP/OBS HIGH 50: CPT | Performed by: PHYSICIAN ASSISTANT

## 2024-05-28 PROCEDURE — 6360000002 HC RX W HCPCS: Performed by: INTERNAL MEDICINE

## 2024-05-28 PROCEDURE — 2700000000 HC OXYGEN THERAPY PER DAY

## 2024-05-28 PROCEDURE — 94760 N-INVAS EAR/PLS OXIMETRY 1: CPT

## 2024-05-28 PROCEDURE — 80162 ASSAY OF DIGOXIN TOTAL: CPT

## 2024-05-28 PROCEDURE — 36415 COLL VENOUS BLD VENIPUNCTURE: CPT

## 2024-05-28 PROCEDURE — 2580000003 HC RX 258: Performed by: INTERNAL MEDICINE

## 2024-05-28 PROCEDURE — 2580000003 HC RX 258: Performed by: NURSE ANESTHETIST, CERTIFIED REGISTERED

## 2024-05-28 PROCEDURE — 36600 WITHDRAWAL OF ARTERIAL BLOOD: CPT

## 2024-05-28 PROCEDURE — 84132 ASSAY OF SERUM POTASSIUM: CPT

## 2024-05-28 PROCEDURE — 94003 VENT MGMT INPAT SUBQ DAY: CPT

## 2024-05-28 PROCEDURE — 6370000000 HC RX 637 (ALT 250 FOR IP): Performed by: STUDENT IN AN ORGANIZED HEALTH CARE EDUCATION/TRAINING PROGRAM

## 2024-05-28 PROCEDURE — 85025 COMPLETE CBC W/AUTO DIFF WBC: CPT

## 2024-05-28 PROCEDURE — 6370000000 HC RX 637 (ALT 250 FOR IP): Performed by: HOSPITALIST

## 2024-05-28 PROCEDURE — 71045 X-RAY EXAM CHEST 1 VIEW: CPT

## 2024-05-28 RX ORDER — SODIUM POLYSTYRENE SULFONATE 15 G/60ML
30 SUSPENSION ORAL; RECTAL ONCE
Status: COMPLETED | OUTPATIENT
Start: 2024-05-28 | End: 2024-05-28

## 2024-05-28 RX ORDER — INSULIN GLARGINE 100 [IU]/ML
5 INJECTION, SOLUTION SUBCUTANEOUS ONCE
Status: COMPLETED | OUTPATIENT
Start: 2024-05-28 | End: 2024-05-28

## 2024-05-28 RX ORDER — DIGOXIN 250 MCG
125 TABLET ORAL EVERY OTHER DAY
Status: DISCONTINUED | OUTPATIENT
Start: 2024-05-29 | End: 2024-05-29

## 2024-05-28 RX ORDER — INSULIN GLARGINE 100 [IU]/ML
20 INJECTION, SOLUTION SUBCUTANEOUS 2 TIMES DAILY
Status: DISCONTINUED | OUTPATIENT
Start: 2024-05-28 | End: 2024-05-31 | Stop reason: HOSPADM

## 2024-05-28 RX ORDER — FLUDROCORTISONE ACETATE 0.1 MG/1
0.1 TABLET ORAL DAILY
Status: DISCONTINUED | OUTPATIENT
Start: 2024-05-28 | End: 2024-05-29

## 2024-05-28 RX ADMIN — WATER 80 MG: 1 INJECTION INTRAMUSCULAR; INTRAVENOUS; SUBCUTANEOUS at 05:48

## 2024-05-28 RX ADMIN — DIPHENHYDRAMINE HYDROCHLORIDE AND ZINC ACETATE: 10; 1 CREAM TOPICAL at 13:09

## 2024-05-28 RX ADMIN — DIPHENHYDRAMINE HYDROCHLORIDE AND ZINC ACETATE: 10; 1 CREAM TOPICAL at 21:12

## 2024-05-28 RX ADMIN — VERAPAMIL HYDROCHLORIDE 80 MG: 80 TABLET ORAL at 14:47

## 2024-05-28 RX ADMIN — DIPHENHYDRAMINE HYDROCHLORIDE AND ZINC ACETATE: 10; 1 CREAM TOPICAL at 08:50

## 2024-05-28 RX ADMIN — VERAPAMIL HYDROCHLORIDE 80 MG: 80 TABLET ORAL at 21:10

## 2024-05-28 RX ADMIN — VERAPAMIL HYDROCHLORIDE 80 MG: 80 TABLET ORAL at 05:48

## 2024-05-28 RX ADMIN — IPRATROPIUM BROMIDE 2 PUFF: 17 AEROSOL, METERED RESPIRATORY (INHALATION) at 15:10

## 2024-05-28 RX ADMIN — DEXMEDETOMIDINE HYDROCHLORIDE 1.2 MCG/KG/HR: 400 INJECTION, SOLUTION INTRAVENOUS at 03:51

## 2024-05-28 RX ADMIN — FLUDROCORTISONE ACETATE 0.1 MG: 0.1 TABLET ORAL at 10:07

## 2024-05-28 RX ADMIN — IPRATROPIUM BROMIDE 2 PUFF: 17 AEROSOL, METERED RESPIRATORY (INHALATION) at 10:40

## 2024-05-28 RX ADMIN — SODIUM CHLORIDE, PRESERVATIVE FREE 20 MG: 5 INJECTION INTRAVENOUS at 08:06

## 2024-05-28 RX ADMIN — BUDESONIDE AND FORMOTEROL FUMARATE DIHYDRATE 2 PUFF: 160; 4.5 AEROSOL RESPIRATORY (INHALATION) at 19:19

## 2024-05-28 RX ADMIN — INSULIN GLARGINE 20 UNITS: 100 INJECTION, SOLUTION SUBCUTANEOUS at 21:13

## 2024-05-28 RX ADMIN — DIGOXIN 250 MCG: 250 TABLET ORAL at 08:29

## 2024-05-28 RX ADMIN — SODIUM CHLORIDE, PRESERVATIVE FREE 20 MG: 5 INJECTION INTRAVENOUS at 21:13

## 2024-05-28 RX ADMIN — MICONAZOLE NITRATE: 2 POWDER TOPICAL at 21:15

## 2024-05-28 RX ADMIN — DEXMEDETOMIDINE HYDROCHLORIDE 1 MCG/KG/HR: 400 INJECTION, SOLUTION INTRAVENOUS at 00:13

## 2024-05-28 RX ADMIN — LEVALBUTEROL HYDROCHLORIDE 0.63 MG: 0.63 SOLUTION RESPIRATORY (INHALATION) at 02:24

## 2024-05-28 RX ADMIN — Medication 5000 UNITS: at 08:30

## 2024-05-28 RX ADMIN — IPRATROPIUM BROMIDE 2 PUFF: 17 AEROSOL, METERED RESPIRATORY (INHALATION) at 19:19

## 2024-05-28 RX ADMIN — WATER 80 MG: 1 INJECTION INTRAMUSCULAR; INTRAVENOUS; SUBCUTANEOUS at 14:47

## 2024-05-28 RX ADMIN — DEXMEDETOMIDINE HYDROCHLORIDE 1.2 MCG/KG/HR: 400 INJECTION, SOLUTION INTRAVENOUS at 11:01

## 2024-05-28 RX ADMIN — SODIUM POLYSTYRENE SULFONATE 30 G: 15 SUSPENSION ORAL; RECTAL at 05:49

## 2024-05-28 RX ADMIN — INSULIN GLARGINE 15 UNITS: 100 INJECTION, SOLUTION SUBCUTANEOUS at 08:05

## 2024-05-28 RX ADMIN — DEXMEDETOMIDINE HYDROCHLORIDE 0.8 MCG/KG/HR: 400 INJECTION, SOLUTION INTRAVENOUS at 15:01

## 2024-05-28 RX ADMIN — DIPHENHYDRAMINE HYDROCHLORIDE AND ZINC ACETATE: 10; 1 CREAM TOPICAL at 16:29

## 2024-05-28 RX ADMIN — SODIUM CHLORIDE, PRESERVATIVE FREE 10 ML: 5 INJECTION INTRAVENOUS at 21:20

## 2024-05-28 RX ADMIN — INSULIN HUMAN 15 UNITS: 100 INJECTION, SOLUTION PARENTERAL at 06:56

## 2024-05-28 RX ADMIN — INSULIN LISPRO 4 UNITS: 100 INJECTION, SOLUTION INTRAVENOUS; SUBCUTANEOUS at 21:14

## 2024-05-28 RX ADMIN — INSULIN HUMAN 10 UNITS: 100 INJECTION, SOLUTION PARENTERAL at 05:16

## 2024-05-28 RX ADMIN — SODIUM BICARBONATE: 84 INJECTION, SOLUTION INTRAVENOUS at 11:04

## 2024-05-28 RX ADMIN — LEVALBUTEROL HYDROCHLORIDE 0.63 MG: 0.63 SOLUTION RESPIRATORY (INHALATION) at 10:40

## 2024-05-28 RX ADMIN — ATORVASTATIN CALCIUM 20 MG: 20 TABLET, FILM COATED ORAL at 08:30

## 2024-05-28 RX ADMIN — PSYLLIUM HUSK 1 PACKET: 3.4 POWDER ORAL at 08:29

## 2024-05-28 RX ADMIN — APIXABAN 5 MG: 5 TABLET, FILM COATED ORAL at 08:30

## 2024-05-28 RX ADMIN — DEXMEDETOMIDINE HYDROCHLORIDE 1.2 MCG/KG/HR: 400 INJECTION, SOLUTION INTRAVENOUS at 07:19

## 2024-05-28 RX ADMIN — Medication 15 G: at 08:29

## 2024-05-28 RX ADMIN — INSULIN LISPRO 12 UNITS: 100 INJECTION, SOLUTION INTRAVENOUS; SUBCUTANEOUS at 08:05

## 2024-05-28 RX ADMIN — 0.12% CHLORHEXIDINE GLUCONATE 15 ML: 1.2 RINSE ORAL at 21:12

## 2024-05-28 RX ADMIN — HYDROCORTISONE ACETATE: 1 CREAM TOPICAL at 21:11

## 2024-05-28 RX ADMIN — 0.12% CHLORHEXIDINE GLUCONATE 15 ML: 1.2 RINSE ORAL at 08:31

## 2024-05-28 RX ADMIN — METOPROLOL SUCCINATE 100 MG: 50 TABLET, EXTENDED RELEASE ORAL at 21:11

## 2024-05-28 RX ADMIN — PSYLLIUM HUSK 1 PACKET: 3.4 POWDER ORAL at 21:20

## 2024-05-28 RX ADMIN — INSULIN LISPRO 16 UNITS: 100 INJECTION, SOLUTION INTRAVENOUS; SUBCUTANEOUS at 16:02

## 2024-05-28 RX ADMIN — INSULIN GLARGINE 5 UNITS: 100 INJECTION, SOLUTION SUBCUTANEOUS at 16:27

## 2024-05-28 RX ADMIN — HYDROCORTISONE ACETATE: 1 CREAM TOPICAL at 08:50

## 2024-05-28 RX ADMIN — APIXABAN 5 MG: 5 TABLET, FILM COATED ORAL at 21:11

## 2024-05-28 RX ADMIN — PROPOFOL 20 MCG/KG/MIN: 10 INJECTION, EMULSION INTRAVENOUS at 13:14

## 2024-05-28 RX ADMIN — BUDESONIDE AND FORMOTEROL FUMARATE DIHYDRATE 2 PUFF: 160; 4.5 AEROSOL RESPIRATORY (INHALATION) at 06:29

## 2024-05-28 RX ADMIN — WATER 80 MG: 1 INJECTION INTRAMUSCULAR; INTRAVENOUS; SUBCUTANEOUS at 21:13

## 2024-05-28 RX ADMIN — INSULIN LISPRO 12 UNITS: 100 INJECTION, SOLUTION INTRAVENOUS; SUBCUTANEOUS at 11:12

## 2024-05-28 RX ADMIN — MICONAZOLE NITRATE: 2 POWDER TOPICAL at 08:50

## 2024-05-28 RX ADMIN — LEVALBUTEROL HYDROCHLORIDE 0.63 MG: 0.63 SOLUTION RESPIRATORY (INHALATION) at 06:29

## 2024-05-28 RX ADMIN — DONEPEZIL HYDROCHLORIDE 10 MG: 10 TABLET, FILM COATED ORAL at 21:10

## 2024-05-28 RX ADMIN — IPRATROPIUM BROMIDE 2 PUFF: 17 AEROSOL, METERED RESPIRATORY (INHALATION) at 06:29

## 2024-05-28 ASSESSMENT — PULMONARY FUNCTION TESTS
PIF_VALUE: 24
PIF_VALUE: 21
PIF_VALUE: 22
PIF_VALUE: 25
PIF_VALUE: 23
PIF_VALUE: 23
PIF_VALUE: 20
PIF_VALUE: 22
PIF_VALUE: 27
PIF_VALUE: 23
PIF_VALUE: 24
PIF_VALUE: 22
PIF_VALUE: 23
PIF_VALUE: 24
PIF_VALUE: 26
PIF_VALUE: 22
PIF_VALUE: 26
PIF_VALUE: 24
PIF_VALUE: 21
PIF_VALUE: 22
PIF_VALUE: 23
PIF_VALUE: 23
PIF_VALUE: 24
PIF_VALUE: 25
PIF_VALUE: 23
PIF_VALUE: 23
PIF_VALUE: 24
PIF_VALUE: 26

## 2024-05-28 NOTE — PLAN OF CARE
Nutrition Problem #1: Inadequate oral intake  Intervention: Food and/or Nutrient Delivery: Continue Current Tube Feeding  Nutritional

## 2024-05-28 NOTE — CONSULTS
Clinical Ethics Consultation Note    Other    Conducted standard ethics screen review of chart and discussion with palliative care and bedside nurse.  No ethics issues were identified.

## 2024-05-28 NOTE — PROGRESS NOTES
[facility] MEDICINE  - PROGRESS NOTE    Admit Date: 5/2/2024 5/28/2024    Subjective: intubated and sedated    Objective:   Vitals: /66   Pulse (!) 109   Temp 96.8 °F (36 °C) (Temporal)   Resp 27   Ht 1.575 m (5' 2\")   Wt 98.6 kg (217 lb 6.4 oz)   SpO2 96%   BMI 39.76 kg/m²   General appearance: alert, appears stated age and cooperative  Skin: Skin color, texture, turgor normal.   HEENT: Head: Normocephalic, no lesions, without obvious abnormality.  Neck: no adenopathy, no carotid bruit, no JVD, supple, symmetrical, trachea midline, and thyroid not enlarged, symmetric, no tenderness/mass/nodules  Lungs: clear to auscultation bilaterally  Heart: irregular rate and rhythm, S1, S2 normal, no murmur, click, rub or gallop  Abdomen: soft, non-tender; bowel sounds normal; no masses,  no organomegaly  Extremities: extremities normal, atraumatic, no cyanosis or edema  Lymphatic: No significant lymph node enlargement papable  Neurologic: Mental status: Alert, oriented, thought content appropriate      Data:   Scheduled Meds: Reviewed  Continuous Infusions:   sodium bicarbonate 150 mEq in dextrose 5 % 1,000 mL infusion 75 mL/hr at 05/28/24 1104    dexmedeTOMIDine HCl in NaCl 1.2 mcg/kg/hr (05/28/24 1101)    propofol Stopped (05/27/24 1335)    sodium chloride      DOBUTamine      dextrose 100 mL/hr at 05/15/24 0532       Intake/Output Summary (Last 24 hours) at 5/28/2024 1252  Last data filed at 5/28/2024 1200  Gross per 24 hour   Intake 1680.9 ml   Output 995 ml   Net 685.9 ml     CBC:   Recent Labs     05/28/24  0207   WBC 11.6*   HGB 10.3*        BMP:  Recent Labs     05/28/24  0207 05/28/24  0359 05/28/24  0958   *  --   --    K 6.0*   < > 3.9   CL 97*  --   --    CO2 21*  --   --    BUN 54*  --   --    CREATININE 1.0*  --   --    GLUCOSE 339*  --   --     < > = values in this interval not displayed.     ABGs:   Lab Results   Component

## 2024-05-28 NOTE — PROGRESS NOTES
Physical Therapy     05/28/24 0900   Subjective   Subjective attempted AM tx; pt sedated/intubated/ and in restraints. will continue to follow up and progress as able.   Vitals   Pulse 99   Respirations 19   SpO2 100 %   /64   MAP (Calculated) 77     Electronically signed by Saurav Briones PTA on 5/28/2024 at 9:19 AM

## 2024-05-28 NOTE — PLAN OF CARE
Problem: Discharge Planning  Goal: Discharge to home or other facility with appropriate resources  5/28/2024 0953 by Tila Jordan RN  Outcome: Progressing  5/27/2024 2103 by Cherry Francois RN  Outcome: Progressing     Problem: Safety - Adult  Goal: Free from fall injury  5/28/2024 0953 by Tila Jordan RN  Outcome: Progressing  5/27/2024 2103 by Cherry Francois RN  Outcome: Progressing     Problem: ABCDS Injury Assessment  Goal: Absence of physical injury  5/28/2024 0953 by Tila Jordan RN  Outcome: Progressing  5/27/2024 2103 by Cherry Francois RN  Outcome: Progressing     Problem: Skin/Tissue Integrity  Goal: Absence of new skin breakdown  Description:  Monitor for areas of redness and/or skin breakdown    5/28/2024 0953 by Tila Jordan RN  Outcome: Progressing  5/27/2024 2103 by Cherry Francois RN  Outcome: Progressing     Problem: Pain  Goal: Verbalizes/displays adequate comfort level or baseline comfort level  5/28/2024 0953 by Tila Jordan RN  Outcome: Progressing  5/27/2024 2103 by Cherry Francois RN  Outcome: Progressing     Problem: Chronic Conditions and Co-morbidities  Goal: Patient's chronic conditions and co-morbidity symptoms are monitored and maintained or improved  5/28/2024 0953 by Tila Jordan RN  Outcome: Progressing  5/27/2024 2103 by Cherry Francois RN  Outcome: Progressing     Problem: Neurosensory - Adult  Goal: Achieves stable or improved neurological status  5/28/2024 0953 by Tila Jordan RN  Outcome: Progressing  5/27/2024 2103 by Cherry Francois RN  Outcome: Progressing     Problem: Respiratory - Adult  Goal: Achieves optimal ventilation and oxygenation  5/28/2024 0953 by Tila Jordan RN  Outcome: Progressing  5/27/2024 2103 by Cherry Francois RN  Outcome: Progressing     Problem: Cardiovascular - Adult  Goal: Maintains optimal cardiac output and hemodynamic stability  5/28/2024 0953 by Tila Jordan  information  6. Serve as a liaison between patient and family and health care team  7. Initiate Consults from Ethics, Palliative Care or initiate Family Care Conference as is appropriate  5/28/2024 0953 by Tila Jordan RN  Outcome: Progressing  5/27/2024 2103 by Cherry Francois RN  Outcome: Progressing     Problem: Confusion  Goal: Confusion, delirium, dementia, or psychosis is improved or at baseline  Description: INTERVENTIONS:  1. Assess for possible contributors to thought disturbance, including medications, impaired vision or hearing, underlying metabolic abnormalities, dehydration, psychiatric diagnoses, and notify attending LIP  2. [place] high risk fall precautions, as indicated  3. Provide frequent short contacts to provide reality reorientation, refocusing and direction  4. Decrease environmental stimuli, including noise as appropriate  5. Monitor and intervene to maintain adequate nutrition, hydration, elimination, sleep and activity  6. If unable to ensure safety without constant attention obtain sitter and review sitter guidelines with assigned personnel  7. Initiate Psychosocial CNS and Spiritual Care consult, as indicated  5/28/2024 0953 by Tila Jordan RN  Outcome: Progressing  5/27/2024 2103 by Cherry Francois RN  Outcome: Progressing     Problem: Nutrition Deficit:  Goal: Optimize nutritional status  5/28/2024 0953 by Tila Jordan RN  Outcome: Progressing  5/27/2024 2103 by Cherry Francois RN  Outcome: Progressing

## 2024-05-28 NOTE — PROGRESS NOTES
Palliative Care Progress Note  5/28/2024 9:30 AM    Patient:  Edie Cat  YOB: 1945  Primary Care Physician: Dudley Eduardo  Advance Directive: Full Code  Admit Date: 5/2/2024       Hospital Day: 26  Portions of this note have been copied forward, however, changed to reflect the most current clinical status of this patient.    CHIEF COMPLAINT/REASON FOR CONSULTATION Goals of care, family support, Code status, symptom management     SUBJECTIVE:  Ms. Cat was intubated on 05/26/2024 during cardiac arrest just after receiving amiodarone bolus per nursing report. She remains intubated but is able to follow commands.    HPI:  The patient is a 78 y.o. female with PMH dementia, persistent atrial fibrillation, DM II, blindness bilaterally, hypothyroidism, CVA, PE who presented to [facility] on 05/02/2024 from OSH where work up was concerning for COVID-19, Afib RVR, acute hypoxemic respiratory failure.  She is in manage to hospital service and was placed on dexamethasone, pharmacy to dose tocilizumab and Cardizem drip.  She then became bradycardic, medication adjusted and that was resolved.  Plans were for discharge to nursing facility on 5/7/2023, however, she had a change of condition with worsening dyspnea and elevated heart rate.  Solu-Medrol ordered.  Cardiology consulted for persistent A-fib with rapid rates.  Multiple medication adjustments have been made and rate has been difficult to control.  She has had fluctuating tachycardia as well as bradycardia.  She has been continued on Eliquis.  COVID isolation discontinued on 5/20/2024.  She underwent SAMANTHA and no thrombus was noted in the right atrium, left atrial appendage free of thrombus, mild aortic stenosis, EF 45%, small slitlike PFO without significant right to left shunting appreciated, DCCV performed on 5/21/2024.  Sinus rhythm was noted, however, overnight the patient reverted back to A-fib RVR.  Cardiology has reevaluated and has discussed  possible AV shweta ablation with pacemaker placement.  Patient remains undecided.  Palliative care was consulted to assist with goals of care.     Review of Systems:   14 point review of systems is negative except as specifically addressed above.    Objective:   VITALS:  /64   Pulse 99   Temp (!) 96.2 °F (35.7 °C) (Temporal)   Resp 19   Ht 1.575 m (5' 2\")   Wt 98.6 kg (217 lb 6.4 oz)   SpO2 100%   BMI 39.76 kg/m²   24HR INTAKE/OUTPUT:    Intake/Output Summary (Last 24 hours) at 5/28/2024 0930  Last data filed at 5/28/2024 0800  Gross per 24 hour   Intake 1577.31 ml   Output 995 ml   Net 582.31 ml     General appearance:79 yo female, intubated, no acute distress, appears comfortable   Head: Normocephalic, without obvious abnormality, atraumatic  Eyes: conjunctivae/corneas clear. PERRL, EOM's intact.   Ears/Nose: normal external ears and nose  Neck/Throat: supple, symmetrical, trachea midline   Pulmonary: clear throughout, no rhonchi noted, mechanically ventilated   Cardiovascular: irregularly irregular   Gastrointestinal: soft, obese, non-tender   Musculoskeletal: trace LE edema bilaterally   Skin: warm, dry, rash greatly improved, very little erythema noted at this point, large patchy areas of dry scaly skin anegl on BUEs  Neurologic: intubated, sedated    Medications:      sodium bicarbonate 150 mEq in dextrose 5 % 1,000 mL infusion      dexmedeTOMIDine HCl in NaCl 1.2 mcg/kg/hr (05/28/24 0719)    propofol Stopped (05/27/24 1335)    sodium chloride      DOBUTamine      dextrose 100 mL/hr at 05/15/24 0532      fludrocortisone  0.1 mg Oral Daily    insulin glargine  20 Units SubCUTAneous BID    insulin lispro  0-16 Units SubCUTAneous TID WC    insulin lispro  0-4 Units SubCUTAneous Nightly    verapamil  80 mg Oral 3 times per day    metoprolol succinate  100 mg Oral BID    methylPREDNISolone  80 mg IntraVENous Q8H    levalbuterol  0.63 mg Nebulization Q4H    sodium chloride  1,000 mL IntraVENous Once

## 2024-05-28 NOTE — PROGRESS NOTES
Hospitalist Progress Note    Patient:  Edie Cat  YOB: 1945  Date of Service: 5/28/2024  MRN: [id number]   Acct: [id number]   Primary Care Physician: Dudley Eduardo  Advance Directive: Full Code  Admit Date: 5/2/2024       Hospital Day: 26  Referring Provider: Darwin Ledesma DO    Patient Seen, Chart, Consults, Notes, Labs, Radiology studies reviewed.    Subjective:  Edie Cat is a 78 y.o. female  whom we are following for distant COVID-19 infection, atrial fibrillation with rapid ventricular response, hypoxemic respiratory failure.  Her serum potassium was elevated this morning.  We checked a digoxin level and she is slightly toxic.  We will hold her digoxin today and resume dosing at 125 mcg every other day tomorrow.  She is still difficult weaning.  I will ask pulmonology for assistance.    Allergies:  Cardizem [diltiazem hcl] and Amoxicillin    Medicines:  Current Facility-Administered Medications   Medication Dose Route Frequency Provider Last Rate Last Admin    fludrocortisone (FLORINEF) tablet 0.1 mg  0.1 mg Oral Daily Darwin Ledesma DO   0.1 mg at 05/28/24 1007    sodium bicarbonate 150 mEq in dextrose 5 % 1,000 mL infusion   IntraVENous Continuous Darwin Ledesma DO 75 mL/hr at 05/28/24 1104 New Bag at 05/28/24 1104    insulin glargine (LANTUS) injection vial 20 Units  20 Units SubCUTAneous BID Darwin Ledesma DO        [START ON 5/29/2024] digoxin (LANOXIN) tablet 125 mcg  125 mcg Oral Every Other Day Darwin Ledesma DO        insulin glargine (LANTUS) injection vial 5 Units  5 Units SubCUTAneous Once Darwin Ledesma DO        dexmedeTOMIDine (PRECEDEX) 400 mcg in sodium chloride 0.9 % 100 mL infusion  0.1-1.5 mcg/kg/hr IntraVENous Continuous Darwin Ledesma DO 14.2 mL/hr at 05/28/24 1525 0.6 mcg/kg/hr at 05/28/24 1525    insulin lispro (HUMALOG,ADMELOG) injection vial 0-16 Units  0-16 Units SubCUTAneous TID WC Darwin Ledesma DO   16

## 2024-05-28 NOTE — PROGRESS NOTES
Comprehensive Nutrition Assessment    Type and Reason for Visit:  Reassess    Nutrition Recommendations/Plan:   Continue current tf     Malnutrition Assessment:  Malnutrition Status:  Mild malnutrition (05/28/24 1449)    Context:  Acute Illness     Findings of the 6 clinical characteristics of malnutrition:  Energy Intake:  Mild decrease in energy intake (Comment)  Weight Loss:  No significant weight loss     Body Fat Loss:  No significant body fat loss     Muscle Mass Loss:  No significant muscle mass loss    Fluid Accumulation:  Moderate to Severe Extremities   Strength:  Not Performed    Nutrition Assessment:    tf has been started and is at goal rate.  No Propofol  Continues to receive Proteinex BID  residuals 0-10ml    Nutrition Related Findings:    +1 generalized edema. +2 pitting BLE edema. Wound Type: None       Current Nutrition Intake & Therapies:    Average Meal Intake: NPO  Average Supplements Intake: NPO  Current Tube Feeding (TF) Orders:  Feeding Route: Orogastric  Formula: Peptide Based High Protein  Schedule: Continuous  Feeding Regimen: Vital High Protein 30ml/hr with 2 Proteinrx  Additives/Modulars: Protein  Water Flushes: 25ml  Current TF & Flush Orders Provides: Vital High Protein 30ml/hr with 2 Proteinex = 720 kcals with 62g protein, 79g CHO and 601ml free water from formula.  Flush adds another 600ml free water.  Proteinex adds 208 kcals with 52g protein  Goal TF & Flush Orders Provides: Vital High Protein 30ml/hr with 2 Proteinex = 928 kcals with 114g protein, 79g CHO and 1201ml free water from formula and flush    Anthropometric Measures:  Height: 157.5 cm (5' 2\")  Ideal Body Weight (IBW): 110 lbs (50 kg)    Admission Body Weight: 93.1 kg (205 lb 4.8 oz)  Current Body Weight: 98.6 kg (217 lb 6 oz), 190 % IBW. Weight Source: Bed Scale  Current BMI (kg/m2): 39.7  Usual Body Weight: 82.6 kg (182 lb 1.6 oz) (7/3/23)  % Weight Change (Calculated): 14.8  Weight Adjustment For: No

## 2024-05-29 PROBLEM — R21 RASH: Status: ACTIVE | Noted: 2024-05-29

## 2024-05-29 PROBLEM — R53.1 GENERALIZED WEAKNESS: Status: ACTIVE | Noted: 2024-05-29

## 2024-05-29 PROBLEM — L29.9 ITCHING: Status: ACTIVE | Noted: 2024-05-29

## 2024-05-29 LAB
ANION GAP SERPL CALCULATED.3IONS-SCNC: 14 MMOL/L (ref 7–19)
BUN SERPL-MCNC: 58 MG/DL (ref 8–23)
CALCIUM SERPL-MCNC: 7.5 MG/DL (ref 8.8–10.2)
CHLORIDE SERPL-SCNC: 96 MMOL/L (ref 98–111)
CO2 SERPL-SCNC: 25 MMOL/L (ref 22–29)
CREAT SERPL-MCNC: 0.8 MG/DL (ref 0.5–0.9)
DIGOXIN SERPL-MCNC: 1.4 NG/ML (ref 0.6–1.2)
ERYTHROCYTE [DISTWIDTH] IN BLOOD BY AUTOMATED COUNT: 14.8 % (ref 11.5–14.5)
GLUCOSE BLD-MCNC: 336 MG/DL (ref 70–99)
GLUCOSE BLD-MCNC: 357 MG/DL (ref 70–99)
GLUCOSE BLD-MCNC: 385 MG/DL (ref 70–99)
GLUCOSE BLD-MCNC: 400 MG/DL (ref 70–99)
GLUCOSE SERPL-MCNC: 399 MG/DL (ref 74–109)
HCT VFR BLD AUTO: 30.8 % (ref 37–47)
HGB BLD-MCNC: 10.1 G/DL (ref 12–16)
MAGNESIUM SERPL-MCNC: 1.8 MG/DL (ref 1.6–2.4)
MCH RBC QN AUTO: 25.3 PG (ref 27–31)
MCHC RBC AUTO-ENTMCNC: 32.8 G/DL (ref 33–37)
MCV RBC AUTO: 77.2 FL (ref 81–99)
PERFORMED ON: ABNORMAL
PLATELET # BLD AUTO: 256 K/UL (ref 130–400)
PMV BLD AUTO: 10.7 FL (ref 9.4–12.3)
POTASSIUM SERPL-SCNC: 3.5 MMOL/L (ref 3.5–5)
RBC # BLD AUTO: 3.99 M/UL (ref 4.2–5.4)
SODIUM SERPL-SCNC: 135 MMOL/L (ref 136–145)
WBC # BLD AUTO: 13.6 K/UL (ref 4.8–10.8)

## 2024-05-29 PROCEDURE — 2500000003 HC RX 250 WO HCPCS: Performed by: INTERNAL MEDICINE

## 2024-05-29 PROCEDURE — 80162 ASSAY OF DIGOXIN TOTAL: CPT

## 2024-05-29 PROCEDURE — 99232 SBSQ HOSP IP/OBS MODERATE 35: CPT | Performed by: INTERNAL MEDICINE

## 2024-05-29 PROCEDURE — C9113 INJ PANTOPRAZOLE SODIUM, VIA: HCPCS | Performed by: INTERNAL MEDICINE

## 2024-05-29 PROCEDURE — 6370000000 HC RX 637 (ALT 250 FOR IP): Performed by: INTERNAL MEDICINE

## 2024-05-29 PROCEDURE — 2580000003 HC RX 258: Performed by: NURSE ANESTHETIST, CERTIFIED REGISTERED

## 2024-05-29 PROCEDURE — 94003 VENT MGMT INPAT SUBQ DAY: CPT

## 2024-05-29 PROCEDURE — 99291 CRITICAL CARE FIRST HOUR: CPT | Performed by: INTERNAL MEDICINE

## 2024-05-29 PROCEDURE — 2580000003 HC RX 258: Performed by: INTERNAL MEDICINE

## 2024-05-29 PROCEDURE — 2000000000 HC ICU R&B

## 2024-05-29 PROCEDURE — 6360000002 HC RX W HCPCS: Performed by: INTERNAL MEDICINE

## 2024-05-29 PROCEDURE — 36415 COLL VENOUS BLD VENIPUNCTURE: CPT

## 2024-05-29 PROCEDURE — 94760 N-INVAS EAR/PLS OXIMETRY 1: CPT

## 2024-05-29 PROCEDURE — 99232 SBSQ HOSP IP/OBS MODERATE 35: CPT | Performed by: PHYSICIAN ASSISTANT

## 2024-05-29 PROCEDURE — 2700000000 HC OXYGEN THERAPY PER DAY

## 2024-05-29 PROCEDURE — 83735 ASSAY OF MAGNESIUM: CPT

## 2024-05-29 PROCEDURE — 85027 COMPLETE CBC AUTOMATED: CPT

## 2024-05-29 PROCEDURE — 80048 BASIC METABOLIC PNL TOTAL CA: CPT

## 2024-05-29 PROCEDURE — 82962 GLUCOSE BLOOD TEST: CPT

## 2024-05-29 PROCEDURE — 94640 AIRWAY INHALATION TREATMENT: CPT

## 2024-05-29 PROCEDURE — 6370000000 HC RX 637 (ALT 250 FOR IP): Performed by: STUDENT IN AN ORGANIZED HEALTH CARE EDUCATION/TRAINING PROGRAM

## 2024-05-29 RX ORDER — POLYETHYLENE GLYCOL 3350 17 G/17G
17 POWDER, FOR SOLUTION ORAL DAILY PRN
Status: DISCONTINUED | OUTPATIENT
Start: 2024-05-29 | End: 2024-05-31 | Stop reason: HOSPADM

## 2024-05-29 RX ORDER — DIGOXIN 250 MCG
125 TABLET ORAL EVERY OTHER DAY
Status: DISCONTINUED | OUTPATIENT
Start: 2024-05-30 | End: 2024-05-31 | Stop reason: HOSPADM

## 2024-05-29 RX ORDER — WATER 10 ML/10ML
INJECTION INTRAMUSCULAR; INTRAVENOUS; SUBCUTANEOUS
Status: DISCONTINUED
Start: 2024-05-29 | End: 2024-05-30

## 2024-05-29 RX ORDER — POTASSIUM CHLORIDE 7.45 MG/ML
10 INJECTION INTRAVENOUS
Status: COMPLETED | OUTPATIENT
Start: 2024-05-29 | End: 2024-05-29

## 2024-05-29 RX ORDER — MINERAL OIL/HYDROPHIL PETROLAT
OINTMENT (GRAM) TOPICAL NIGHTLY
Status: DISCONTINUED | OUTPATIENT
Start: 2024-05-29 | End: 2024-05-31 | Stop reason: HOSPADM

## 2024-05-29 RX ADMIN — DEXMEDETOMIDINE HYDROCHLORIDE 0.8 MCG/KG/HR: 400 INJECTION, SOLUTION INTRAVENOUS at 01:00

## 2024-05-29 RX ADMIN — MICONAZOLE NITRATE: 2 POWDER TOPICAL at 08:30

## 2024-05-29 RX ADMIN — INSULIN GLARGINE 20 UNITS: 100 INJECTION, SOLUTION SUBCUTANEOUS at 09:30

## 2024-05-29 RX ADMIN — SODIUM CHLORIDE, PRESERVATIVE FREE 40 MG: 5 INJECTION INTRAVENOUS at 14:55

## 2024-05-29 RX ADMIN — VERAPAMIL HYDROCHLORIDE 80 MG: 80 TABLET ORAL at 05:49

## 2024-05-29 RX ADMIN — IPRATROPIUM BROMIDE 2 PUFF: 17 AEROSOL, METERED RESPIRATORY (INHALATION) at 10:04

## 2024-05-29 RX ADMIN — Medication 15 G: at 12:00

## 2024-05-29 RX ADMIN — 0.12% CHLORHEXIDINE GLUCONATE 15 ML: 1.2 RINSE ORAL at 21:24

## 2024-05-29 RX ADMIN — DONEPEZIL HYDROCHLORIDE 10 MG: 10 TABLET, FILM COATED ORAL at 21:25

## 2024-05-29 RX ADMIN — IPRATROPIUM BROMIDE 2 PUFF: 17 AEROSOL, METERED RESPIRATORY (INHALATION) at 18:27

## 2024-05-29 RX ADMIN — POTASSIUM CHLORIDE 10 MEQ: 10 INJECTION, SOLUTION INTRAVENOUS at 14:45

## 2024-05-29 RX ADMIN — BUDESONIDE AND FORMOTEROL FUMARATE DIHYDRATE 2 PUFF: 160; 4.5 AEROSOL RESPIRATORY (INHALATION) at 18:27

## 2024-05-29 RX ADMIN — INSULIN LISPRO 4 UNITS: 100 INJECTION, SOLUTION INTRAVENOUS; SUBCUTANEOUS at 21:24

## 2024-05-29 RX ADMIN — MICONAZOLE NITRATE: 2 POWDER TOPICAL at 21:25

## 2024-05-29 RX ADMIN — BUDESONIDE AND FORMOTEROL FUMARATE DIHYDRATE 2 PUFF: 160; 4.5 AEROSOL RESPIRATORY (INHALATION) at 06:21

## 2024-05-29 RX ADMIN — POTASSIUM CHLORIDE 10 MEQ: 10 INJECTION, SOLUTION INTRAVENOUS at 15:45

## 2024-05-29 RX ADMIN — IPRATROPIUM BROMIDE 2 PUFF: 17 AEROSOL, METERED RESPIRATORY (INHALATION) at 14:38

## 2024-05-29 RX ADMIN — INSULIN LISPRO 16 UNITS: 100 INJECTION, SOLUTION INTRAVENOUS; SUBCUTANEOUS at 10:19

## 2024-05-29 RX ADMIN — SODIUM CHLORIDE, PRESERVATIVE FREE 10 ML: 5 INJECTION INTRAVENOUS at 08:45

## 2024-05-29 RX ADMIN — ATORVASTATIN CALCIUM 20 MG: 20 TABLET, FILM COATED ORAL at 08:18

## 2024-05-29 RX ADMIN — SODIUM CHLORIDE, PRESERVATIVE FREE 20 MG: 5 INJECTION INTRAVENOUS at 08:18

## 2024-05-29 RX ADMIN — APIXABAN 5 MG: 5 TABLET, FILM COATED ORAL at 21:24

## 2024-05-29 RX ADMIN — IPRATROPIUM BROMIDE 2 PUFF: 17 AEROSOL, METERED RESPIRATORY (INHALATION) at 06:21

## 2024-05-29 RX ADMIN — INSULIN LISPRO 16 UNITS: 100 INJECTION, SOLUTION INTRAVENOUS; SUBCUTANEOUS at 17:55

## 2024-05-29 RX ADMIN — INSULIN LISPRO 16 UNITS: 100 INJECTION, SOLUTION INTRAVENOUS; SUBCUTANEOUS at 13:01

## 2024-05-29 RX ADMIN — WATER 80 MG: 1 INJECTION INTRAMUSCULAR; INTRAVENOUS; SUBCUTANEOUS at 05:50

## 2024-05-29 RX ADMIN — DEXMEDETOMIDINE HYDROCHLORIDE 0.8 MCG/KG/HR: 400 INJECTION, SOLUTION INTRAVENOUS at 07:15

## 2024-05-29 RX ADMIN — FLUDROCORTISONE ACETATE 0.1 MG: 0.1 TABLET ORAL at 08:19

## 2024-05-29 RX ADMIN — Medication 5000 UNITS: at 08:19

## 2024-05-29 RX ADMIN — METOPROLOL SUCCINATE 100 MG: 50 TABLET, EXTENDED RELEASE ORAL at 08:40

## 2024-05-29 RX ADMIN — APIXABAN 5 MG: 5 TABLET, FILM COATED ORAL at 08:18

## 2024-05-29 RX ADMIN — DEXMEDETOMIDINE HYDROCHLORIDE 0.8 MCG/KG/HR: 400 INJECTION, SOLUTION INTRAVENOUS at 18:48

## 2024-05-29 RX ADMIN — METHYLPREDNISOLONE SODIUM SUCCINATE 40 MG: 40 INJECTION INTRAMUSCULAR; INTRAVENOUS at 14:50

## 2024-05-29 RX ADMIN — VERAPAMIL HYDROCHLORIDE 80 MG: 80 TABLET ORAL at 14:59

## 2024-05-29 RX ADMIN — SODIUM BICARBONATE: 84 INJECTION, SOLUTION INTRAVENOUS at 01:00

## 2024-05-29 RX ADMIN — 0.12% CHLORHEXIDINE GLUCONATE 15 ML: 1.2 RINSE ORAL at 08:19

## 2024-05-29 RX ADMIN — INSULIN GLARGINE 20 UNITS: 100 INJECTION, SOLUTION SUBCUTANEOUS at 21:24

## 2024-05-29 ASSESSMENT — PULMONARY FUNCTION TESTS
PIF_VALUE: 23
PIF_VALUE: 25
PIF_VALUE: 24
PIF_VALUE: 24
PIF_VALUE: 25
PIF_VALUE: 26
PIF_VALUE: 10
PIF_VALUE: 23
PIF_VALUE: 27
PIF_VALUE: 26
PIF_VALUE: 25
PIF_VALUE: 21
PIF_VALUE: 23
PIF_VALUE: 22
PIF_VALUE: 18
PIF_VALUE: 23
PIF_VALUE: 25
PIF_VALUE: 23
PIF_VALUE: 24
PIF_VALUE: 22
PIF_VALUE: 23
PIF_VALUE: 25
PIF_VALUE: 23
PIF_VALUE: 23
PIF_VALUE: 25
PIF_VALUE: 27
PIF_VALUE: 23
PIF_VALUE: 26
PIF_VALUE: 24
PIF_VALUE: 24

## 2024-05-29 NOTE — PLAN OF CARE
Problem: Nutrition Deficit:  Goal: Optimize nutritional status  5/29/2024 1006 by Ashlyn Mims, MS, RD, LD  Outcome: Progressing  Flowsheets (Taken 5/29/2024 0954)  Nutrient intake appropriate for improving, restoring, or maintaining nutritional needs:   Assess nutritional status and recommend course of action   Recommend, monitor, and adjust tube feedings and TPN/PPN based on assessed needs  5/29/2024 0137 by Carmela Bradley, RN  Outcome: Progressing  Flowsheets (Taken 5/28/2024 1442 by Ashlyn Mims, MS, RD, LD)  Nutrient intake appropriate for improving, restoring, or maintaining nutritional needs:   Assess nutritional status and recommend course of action   Recommend, monitor, and adjust tube feedings and TPN/PPN based on assessed needs

## 2024-05-29 NOTE — CONSULTS
Pulmonary and Critical Care Consult Note    [facility] ROSALBA Cat    MRN# [id number]    Acct# [id number]  5/29/2024   1:03 PM CDT    Referring Provider:Juan Manuel Maradiaga MD      Chief Complaint: resp failure on the vent.      Requesting physician: Dr. Ledesma     Reason for consult: resp failure on the vent.       HPI: We have been consulted to see this 78 y.o. year old female born on 1945. The patient is intubated on the vent and sedated. History obtained from the record and nursing staff. Patient admitted with shortness of breath and afib with RVR. History of remote covid. She was undergoing treatment for Afib. Received amiodarone two days ago and developed severe bradycardia followed by brief cardiac arrest. Patient was intubated and transferred to the ICU. Currently intubated on the vent sedated. She has been tachypneic on the vent occasionally today. Did not tolerate SBT.       Past Medical History      Past Medical History:   Diagnosis Date    A-fib (HCC)     CAD (coronary artery disease)     Cerebral artery occlusion with cerebral infarction (HCC)     COPD (chronic obstructive pulmonary disease) (HCC)     COVID     Dementia (HCC)     Diabetes mellitus (HCC)     Functional blindness 09/26/2011    Hx of blood clots     Hyperlipidemia     Hypertension     Knee arthroplasty 08/09/2011    Palliative care patient 05/22/2024    Thyroid disease     TIA (transient ischemic attack)      SurgicalHistory  Past Surgical History:   Procedure Laterality Date    ABDOMEN SURGERY      APPENDECTOMY      CHOLECYSTECTOMY      TOTAL KNEE ARTHROPLASTY  08/09/2011     Allergies  Allergies   Allergen Reactions    Cardizem [Diltiazem Hcl] Rash     Moderate to severe exfoliative dermatitis     Amoxicillin      Reaction unknown         The following medications were reviewed and adjustments are made when

## 2024-05-29 NOTE — PROGRESS NOTES
Cardiology Progress Note Cruz Perkins MD      Patient:  Edie Cat  [id number]    Patient Active Problem List    Diagnosis Date Noted    Atrial fibrillation, persistent (HCC) 05/02/2024     Priority: High    Mild malnutrition (HCC) 05/27/2024     Priority: Low    Palliative care patient 05/22/2024     Priority: Low    Rash in adult 05/22/2024     Priority: Low    Diabetes mellitus (HCC) 09/26/2011     Priority: Low     Overview Note:     replace inactive diagnosis      Hyperglycemia 09/26/2011     Priority: Low    Morbid obesity (HCC) 09/26/2011     Priority: Low    History of stroke 09/26/2011     Priority: Low    Pulmonary embolus (HCC) 09/26/2011     Priority: Low    Systemic hypertension 09/26/2011     Priority: Low    Functional blindness 09/26/2011     Priority: Low    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     Priority: Low    Arthritis, degenerative 09/26/2011     Priority: Low    Dementia (HCC) 09/26/2011     Priority: Low    Former cigarette smoker 09/26/2011     Priority: Low    Psoriasis 09/26/2011     Priority: Low    Hyponatremia 09/26/2011     Priority: Low    Vitamin D deficiency 09/26/2011     Priority: Low    Atrial fibrillation (HCC) 08/12/2011     Priority: Low     Overview Note:     8/12/2011 DCCV on aminodarone      CAD (coronary artery disease) 08/12/2011     Priority: Low     Overview Note:     8/15/2011  cardiolyte negative for myocardial ischemia, EF  66%         Admit Date:  5/2/2024    Admission Problem List: Present on Admission:   (Resolved) COVID-19   Palliative care patient   Dementia (HCC)   Diabetes mellitus (HCC)   Functional blindness   Psoriasis   Vitamin D deficiency   Rash in adult   Mild malnutrition (HCC)      Cardiac Specific Data:  Specialty Problems          Cardiology Problems    Atrial fibrillation, persistent (HCC)        Atrial fibrillation (HCC)        CAD (coronary artery disease)        Pulmonary embolus (HCC)        Systemic hypertension  likely triggered by interaction with digoxin.  Currently rates borderline between 80s to 110 bpm while sedated.  Current plan for AV node ablation and pacemaker placement on Monday.  Can continue digoxin 0.125 mg alternate days and monitor.  Currently on Toprol- mg twice daily as well as verapamil 80 mg every 8.  Tolerating without any worsening in rash.  2.  Will continue to follow-up        Cruz Perkins MD, MD 5/29/2024 12:22 PM    Thisdictation was generated by voice recognition computer software.  Although all attempts are made to edit the dictation for accuracy, there may be errors in the transcription that are not intended.

## 2024-05-29 NOTE — PROGRESS NOTES
Physical Therapy    Name: Edie Cat  MRN: [id number]  Date of service: 5/29/2024    RASHI Madsen states Pt not appropriate for therapy at this time due to being Ventilated. Will continue to follow.       Electronically signed by Iván Segura PTA on 5/29/2024 at 2:49 PM

## 2024-05-29 NOTE — PROGRESS NOTES
Hospitalist Progress Note  [facility]     Patient: Edie Cat  : 1945  MRN: [id number]  Code Status: Full Code    Hospital Day:    Date of Service: 2024    Subjective:   Patient seen and examined.  Intubated and sedated.    Past Medical History:   Diagnosis Date    A-fib (HCC)     CAD (coronary artery disease)     Cerebral artery occlusion with cerebral infarction (HCC)     COPD (chronic obstructive pulmonary disease) (HCC)     COVID     Dementia (HCC)     Diabetes mellitus (HCC)     Functional blindness 2011    Hx of blood clots     Hyperlipidemia     Hypertension     Knee arthroplasty 2011    Palliative care patient 2024    Thyroid disease     TIA (transient ischemic attack)        Past Surgical History:   Procedure Laterality Date    ABDOMEN SURGERY      APPENDECTOMY      CHOLECYSTECTOMY      TOTAL KNEE ARTHROPLASTY  2011       History reviewed. No pertinent family history.    Social History     Socioeconomic History    Marital status:      Spouse name: Not on file    Number of children: Not on file    Years of education: Not on file    Highest education level: Not on file   Occupational History    Not on file   Tobacco Use    Smoking status: Never    Smokeless tobacco: Never   Vaping Use    Vaping Use: Never used   Substance and Sexual Activity    Alcohol use: Not Currently    Drug use: Never    Sexual activity: Not on file   Other Topics Concern    Not on file   Social History Narrative    Not on file     Social Determinants of Health     Financial Resource Strain: Not on file   Food Insecurity: Not on file   Transportation Needs: Not on file   Physical Activity: Not on file   Stress: Not on file   Social Connections: Not on file   Intimate Partner Violence: Not on file   Housing Stability: Not on file       Current Facility-Administered Medications   Medication Dose Route Frequency Provider Last Rate Last Admin    [START ON 2024] digoxin     vitamin D (ERGOCALCIFEROL) capsule 50,000 Units  50,000 Units Oral Weekly Darwin Ledesma DO   50,000 Units at 05/24/24 0943    vitamin D (CHOLECALCIFEROL) capsule 5,000 Units  5,000 Units Oral Daily Darwin Ledesma DO   5,000 Units at 05/29/24 0819    glucose chewable tablet 16 g  4 tablet Oral PRN Darwin Ledesma DO   16 g at 05/15/24 0456    dextrose bolus 10% 125 mL  125 mL IntraVENous PRN Darwin Ledesma DO   Stopped at 05/20/24 0513    Or    dextrose bolus 10% 250 mL  250 mL IntraVENous PRN Darwin Ledesma DO        glucagon injection 1 mg  1 mg SubCUTAneous PRN Darwin Ledesma DO        dextrose 10 % infusion   IntraVENous Continuous PRN Darwin Ledesma  mL/hr at 05/15/24 0532 New Bag at 05/15/24 0532    donepezil (ARICEPT) tablet 10 mg  10 mg Oral Nightly Darwin Ledesma DO   10 mg at 05/28/24 2110    atorvastatin (LIPITOR) tablet 20 mg  20 mg Oral Daily Darwin Ledesma DO   20 mg at 05/29/24 0818    potassium chloride 20 mEq/50 mL IVPB (Central Line)  20 mEq IntraVENous PRN Darwin Ledesma DO        Or    potassium chloride 10 mEq/100 mL IVPB (Peripheral Line)  10 mEq IntraVENous PRN Darwin Ledesma DO        magnesium sulfate 2000 mg in 50 mL IVPB premix  2,000 mg IntraVENous PRN Darwin Ledesma DO        acetaminophen (TYLENOL) tablet 650 mg  650 mg Oral Q6H PRN Darwin Ledesma DO   650 mg at 05/25/24 1554    Or    acetaminophen (TYLENOL) suppository 650 mg  650 mg Rectal Q6H PRN Darwin Ledesma DO             sodium bicarbonate 150 mEq in dextrose 5 % 1,000 mL infusion 75 mL/hr at 05/29/24 0100    dexmedeTOMIDine HCl in NaCl 0.6 mcg/kg/hr (05/29/24 1050)    propofol Stopped (05/29/24 1021)    sodium chloride      DOBUTamine      dextrose 100 mL/hr at 05/15/24 0532        Objective:   BP 97/64   Pulse (!) 106   Temp 98 °F (36.7 °C) (Temporal)   Resp 26   Ht 1.575 m (5' 2\")   Wt 97.1 kg (214 lb 1.6 oz)   SpO2

## 2024-05-29 NOTE — PROGRESS NOTES
Patient placed on SBT at 10:55.  Notified RT.    Electronically signed by Tena Jimenez RN on 5/29/2024 at 11:00 AM      Patient was doing well initially on SBT (on Precedex 0.6-proprofol stopped earlier in anticipation of SBT), then all at once became tachypniec rate up to 50, f/VT greater than 300.  Tried to calm patient and redirect breathing unsuccessfully.  Patient flipped to previous vent settings, total SBT time 13 mins.  Notified RT.    Electronically signed by Tena Jimenez RN on 5/29/2024 at 11:12 AM

## 2024-05-29 NOTE — PROGRESS NOTES
Functional blindness    Dementia (HCC)    Psoriasis    Vitamin D deficiency    Palliative care patient    Rash in adult    Mild malnutrition (HCC)    Visit Summary:  Chart reviewed.  Discussed with ICU RN.  Patient seen at bedside.  She was seen after SBT's were terminated and appears comfortable during my visit.  Her nurse indicates she became distressed after initially doing well with SBT's.  She developed tachypnea and tachycardia.  Plans for ablation/biventricular pacemaker placement next week with additional help that patient has been liberated from the ventilator by that time.  Her daughter is her primary decision-maker and receiving updates and is planning to visit later today per staff.  Will continue to follow and if patient is unable to be extubated will need to readdress CODE STATUS and goals of care.    Palliative team will follow as needed.    Recommendations:   Palliative Care-GOC unchanged, prolong life-move forward w/ ablation/pacemaker placement as soon as OK w/ Cardiology, plans for placement if/when medically stable for discharge following procedure Code status: Full code -plans for additional conversation based on further hospital course  A-fib RVR-Eliquis continued-plan to hold over the weekend in preparation for procedure, Cardiology following, s/p unsuccessful SAMANTHA/DCCV 05/21/2024, plans to move forward w/ ablation/dual-chamber pacemaker placement next week  Diffuse pruritic rash-suspect 2/2 Adverse rxn from Cardizem, concern for exfoliative dermatitis, d/w Cardiology and Pharmacy, Cardizem DC'd 05/22 and changed to Digoxin based on their recommendations, rash largely improved, will change hydrocortisone/benadryl to prn  COVID-19 with acute hypoxemic respiratory failure-received dexamethasone and tocilizumab, intubated 05/26/2024, SBT's ongoing  TAJ on CKD now w/ concern for hyperkalemia-hospitalist mgmt, has received tx for hyperkalemia w/ plans for repeat labs  SIADH-noted, 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

## 2024-05-29 NOTE — PROGRESS NOTES
HOSPITAL MEDICINE  - PROGRESS NOTE    Admit Date: 5/2/2024 5/29/2024    Subjective: ventilated and awake    Objective:   Vitals: BP 97/64   Pulse (!) 119   Temp 98 °F (36.7 °C) (Temporal)   Resp 28   Ht 1.575 m (5' 2\")   Wt 97.1 kg (214 lb 1.6 oz)   SpO2 93%   BMI 39.16 kg/m²   General appearance: alert, appears stated age and cooperative  Skin: Skin color, texture, turgor normal.   HEENT: Head: Normocephalic, no lesions, without obvious abnormality.  Neck: no adenopathy, no carotid bruit, no JVD, supple, symmetrical, trachea midline, and thyroid not enlarged, symmetric, no tenderness/mass/nodules  Lungs: clear to auscultation bilaterally  Heart: irregularly irregular rhythm  Abdomen: soft, non-tender; bowel sounds normal; no masses,  no organomegaly  Extremities: extremities normal, atraumatic, no cyanosis or edema  Lymphatic: No significant lymph node enlargement papable  Neurologic: Mental status: ventilated and following commands      Data:   Scheduled Meds: Reviewed  Continuous Infusions:   sodium bicarbonate 150 mEq in dextrose 5 % 1,000 mL infusion 75 mL/hr at 05/29/24 0100    dexmedeTOMIDine HCl in NaCl 0.8 mcg/kg/hr (05/29/24 1458)    propofol Stopped (05/29/24 1021)    sodium chloride      DOBUTamine      dextrose 100 mL/hr at 05/15/24 0532       Intake/Output Summary (Last 24 hours) at 5/29/2024 1649  Last data filed at 5/29/2024 1445  Gross per 24 hour   Intake 1250.67 ml   Output 1525 ml   Net -274.33 ml     CBC:   Recent Labs     05/29/24  0330   WBC 13.6*   HGB 10.1*        BMP:  Recent Labs     05/29/24  1140   *   K 3.5   CL 96*   CO2 25   BUN 58*   CREATININE 0.8   GLUCOSE 399*     ABGs:   Lab Results   Component Value Date/Time    PHART 7.490 05/28/2024 09:58 AM    PO2ART 72.0 05/28/2024 09:58 AM    UFK9FAG 32.0 05/28/2024 09:58 AM     INR: No results for input(s): \"INR\" in the last 72

## 2024-05-29 NOTE — PROGRESS NOTES
Comprehensive Nutrition Assessment    Type and Reason for Visit:  Reassess    Nutrition Recommendations/Plan:   Continue to monitor Propofol rate adjusting tf as needed     Malnutrition Assessment:  Malnutrition Status:  Mild malnutrition (05/29/24 1002)    Context:  Acute Illness     Findings of the 6 clinical characteristics of malnutrition:  Energy Intake:  No significant decrease in energy intake  Weight Loss:  No significant weight loss     Body Fat Loss:  No significant body fat loss     Muscle Mass Loss:  No significant muscle mass loss    Fluid Accumulation:  Moderate to Severe Extremities, Generalized   Strength:  Not Performed    Nutrition Assessment:    TF  continues at 30ml/hr with no residuas.  Propofol has been started and is currently at 5.6ml/hr.  Accucheks 338-362 on Solumedrol    Nutrition Related Findings:    +1 RUE and generalized, +2 pitting BLE and LUE edema Wound Type: None       Current Nutrition Intake & Therapies:    Average Meal Intake: NPO  Average Supplements Intake: NPO  Current Tube Feeding (TF) Orders:  Feeding Route: Orogastric  Formula: Peptide Based High Protein  Schedule: Continuous  Feeding Regimen: Vital High Protein 30ml/hr with 2 Proteinrx  Additives/Modulars: Protein  Water Flushes: 25ml  Current TF & Flush Orders Provides: Vital High Protein 30ml/hr with 2 Proteinex = 720 kcals with 62g protein, 79g CHO and 601ml free water from formula.  Flush adds another 600ml free water.  Proteinex adds 208 kcals with 52g protein  Goal TF & Flush Orders Provides: Vital high Protein at 30ml/hr with 2 Proteinex = 928 kcal with 114g protein, 79g CHO and 1201 ml free water from formula and flush.  Propofol adds another 147 NP kcals    Anthropometric Measures:  Height: 157.5 cm (5' 2\")  Ideal Body Weight (IBW): 110 lbs (50 kg)    Admission Body Weight: 93.1 kg (205 lb 4.8 oz)  Current Body Weight: 97.1 kg (214 lb 1.1 oz), 190 % IBW. Weight Source: Bed Scale  Current BMI (kg/m2):

## 2024-05-30 ENCOUNTER — APPOINTMENT (OUTPATIENT)
Dept: GENERAL RADIOLOGY | Age: 79
End: 2024-05-30
Attending: INTERNAL MEDICINE
Payer: MEDICARE

## 2024-05-30 LAB
ALLENS TEST: ABNORMAL
ANION GAP SERPL CALCULATED.3IONS-SCNC: 13 MMOL/L (ref 7–19)
APTT PPP: 32.1 SEC (ref 26–36.2)
BASE EXCESS ARTERIAL: 10.4 MMOL/L (ref -2–2)
BASE EXCESS ARTERIAL: 11.1 MMOL/L (ref -2–2)
BASE EXCESS ARTERIAL: 11.3 MMOL/L (ref -2–2)
BASOPHILS # BLD: 0 K/UL (ref 0–0.2)
BASOPHILS NFR BLD: 0.1 % (ref 0–1)
BUN SERPL-MCNC: 68 MG/DL (ref 8–23)
CALCIUM SERPL-MCNC: 7.4 MG/DL (ref 8.8–10.2)
CARBOXYHEMOGLOBIN ARTERIAL: 1.7 % (ref 0–5)
CARBOXYHEMOGLOBIN ARTERIAL: 1.8 % (ref 0–5)
CARBOXYHEMOGLOBIN ARTERIAL: 1.9 % (ref 0–5)
CHLORIDE SERPL-SCNC: 95 MMOL/L (ref 98–111)
CO2 SERPL-SCNC: 27 MMOL/L (ref 22–29)
CREAT SERPL-MCNC: 0.8 MG/DL (ref 0.5–0.9)
EOSINOPHIL # BLD: 0 K/UL (ref 0–0.6)
EOSINOPHIL NFR BLD: 0.1 % (ref 0–5)
ERYTHROCYTE [DISTWIDTH] IN BLOOD BY AUTOMATED COUNT: 14.7 % (ref 11.5–14.5)
ERYTHROCYTE [DISTWIDTH] IN BLOOD BY AUTOMATED COUNT: 14.9 % (ref 11.5–14.5)
FIO2: 40 %
GLUCOSE BLD-MCNC: 107 MG/DL (ref 70–99)
GLUCOSE BLD-MCNC: 196 MG/DL (ref 70–99)
GLUCOSE BLD-MCNC: 65 MG/DL (ref 70–99)
GLUCOSE BLD-MCNC: 69 MG/DL (ref 70–99)
GLUCOSE BLD-MCNC: 78 MG/DL (ref 70–99)
GLUCOSE BLD-MCNC: 79 MG/DL (ref 70–99)
GLUCOSE BLD-MCNC: 85 MG/DL (ref 70–99)
GLUCOSE SERPL-MCNC: 265 MG/DL (ref 74–109)
HCO3 ARTERIAL: 33.8 MMOL/L (ref 22–26)
HCO3 ARTERIAL: 34.3 MMOL/L (ref 22–26)
HCO3 ARTERIAL: 35.1 MMOL/L (ref 22–26)
HCT VFR BLD AUTO: 30.4 % (ref 37–47)
HCT VFR BLD AUTO: 33.5 % (ref 37–47)
HEMOGLOBIN, ART, EXTENDED: 10.9 G/DL (ref 12–16)
HEMOGLOBIN, ART, EXTENDED: 11.4 G/DL (ref 12–16)
HEMOGLOBIN, ART, EXTENDED: 9.6 G/DL (ref 12–16)
HGB BLD-MCNC: 10 G/DL (ref 12–16)
HGB BLD-MCNC: 10.6 G/DL (ref 12–16)
IMM GRANULOCYTES # BLD: 0.2 K/UL
INR PPP: 1.77 (ref 0.88–1.18)
LYMPHOCYTES # BLD: 1.2 K/UL (ref 1.1–4.5)
LYMPHOCYTES NFR BLD: 8 % (ref 20–40)
MAGNESIUM SERPL-MCNC: 1.8 MG/DL (ref 1.6–2.4)
MCH RBC QN AUTO: 24.9 PG (ref 27–31)
MCH RBC QN AUTO: 25.1 PG (ref 27–31)
MCHC RBC AUTO-ENTMCNC: 31.6 G/DL (ref 33–37)
MCHC RBC AUTO-ENTMCNC: 32.9 G/DL (ref 33–37)
MCV RBC AUTO: 76.2 FL (ref 81–99)
MCV RBC AUTO: 78.6 FL (ref 81–99)
MECHANICAL RATE IN BPM: 18
METHEMOGLOBIN ARTERIAL: 0.9 %
METHEMOGLOBIN ARTERIAL: 1.1 %
METHEMOGLOBIN ARTERIAL: 1.4 %
MODE: ABNORMAL
MODE: ABNORMAL
MONOCYTES # BLD: 0.8 K/UL (ref 0–0.9)
MONOCYTES NFR BLD: 5.5 % (ref 0–10)
NEUTROPHILS # BLD: 12.3 K/UL (ref 1.5–7.5)
NEUTS SEG NFR BLD: 85 % (ref 50–65)
O2 CONTENT ARTERIAL: 12.7 ML/DL
O2 CONTENT ARTERIAL: 14.6 ML/DL
O2 CONTENT ARTERIAL: 15.4 ML/DL
O2 DELIVERY DEVICE: ABNORMAL
O2 SAT, ARTERIAL: 93.7 %
O2 SAT, ARTERIAL: 95 %
O2 SAT, ARTERIAL: 95.6 %
O2 THERAPY: ABNORMAL
OXYGEN FLOW: 15
PCO2 ARTERIAL: 36 MMHG (ref 35–45)
PCO2 ARTERIAL: 42 MMHG (ref 35–45)
PCO2 ARTERIAL: 42 MMHG (ref 35–45)
PERFORMED ON: ABNORMAL
PERFORMED ON: NORMAL
PH ARTERIAL: 7.52 (ref 7.35–7.45)
PH ARTERIAL: 7.53 (ref 7.35–7.45)
PH ARTERIAL: 7.58 (ref 7.35–7.45)
PLATELET # BLD AUTO: 243 K/UL (ref 130–400)
PLATELET # BLD AUTO: 450 K/UL (ref 130–400)
PLATELET SLIDE REVIEW: ADEQUATE
PMV BLD AUTO: 10.4 FL (ref 9.4–12.3)
PMV BLD AUTO: 9.8 FL (ref 9.4–12.3)
PO2 ARTERIAL: 69 MMHG (ref 80–100)
PO2 ARTERIAL: 72 MMHG (ref 80–100)
PO2 ARTERIAL: 91 MMHG (ref 80–100)
POSITIVE END EXP PRESS: 5
POSITIVE END EXP PRESS: 5
POTASSIUM BLD-SCNC: 3.2 MMOL/L
POTASSIUM BLD-SCNC: 3.2 MMOL/L
POTASSIUM BLD-SCNC: 3.3 MMOL/L
POTASSIUM SERPL-SCNC: 3.6 MMOL/L (ref 3.5–5)
PROTHROMBIN TIME: 20.1 SEC (ref 12–14.6)
RBC # BLD AUTO: 3.99 M/UL (ref 4.2–5.4)
RBC # BLD AUTO: 4.26 M/UL (ref 4.2–5.4)
SAMPLE SOURCE: ABNORMAL
SODIUM SERPL-SCNC: 135 MMOL/L (ref 136–145)
VT MECHANICAL: 450 %
WBC # BLD AUTO: 14.4 K/UL (ref 4.8–10.8)
WBC # BLD AUTO: 22.1 K/UL (ref 4.8–10.8)

## 2024-05-30 PROCEDURE — 6360000002 HC RX W HCPCS: Performed by: INTERNAL MEDICINE

## 2024-05-30 PROCEDURE — 85610 PROTHROMBIN TIME: CPT

## 2024-05-30 PROCEDURE — 2580000003 HC RX 258: Performed by: INTERNAL MEDICINE

## 2024-05-30 PROCEDURE — 36600 WITHDRAWAL OF ARTERIAL BLOOD: CPT

## 2024-05-30 PROCEDURE — 99232 SBSQ HOSP IP/OBS MODERATE 35: CPT | Performed by: INTERNAL MEDICINE

## 2024-05-30 PROCEDURE — 6370000000 HC RX 637 (ALT 250 FOR IP): Performed by: INTERNAL MEDICINE

## 2024-05-30 PROCEDURE — 71045 X-RAY EXAM CHEST 1 VIEW: CPT

## 2024-05-30 PROCEDURE — 99232 SBSQ HOSP IP/OBS MODERATE 35: CPT | Performed by: PHYSICIAN ASSISTANT

## 2024-05-30 PROCEDURE — 36415 COLL VENOUS BLD VENIPUNCTURE: CPT

## 2024-05-30 PROCEDURE — 85027 COMPLETE CBC AUTOMATED: CPT

## 2024-05-30 PROCEDURE — 2500000003 HC RX 250 WO HCPCS

## 2024-05-30 PROCEDURE — 82803 BLOOD GASES ANY COMBINATION: CPT

## 2024-05-30 PROCEDURE — 99291 CRITICAL CARE FIRST HOUR: CPT | Performed by: INTERNAL MEDICINE

## 2024-05-30 PROCEDURE — 94760 N-INVAS EAR/PLS OXIMETRY 1: CPT

## 2024-05-30 PROCEDURE — 94640 AIRWAY INHALATION TREATMENT: CPT

## 2024-05-30 PROCEDURE — 2500000003 HC RX 250 WO HCPCS: Performed by: INTERNAL MEDICINE

## 2024-05-30 PROCEDURE — C9113 INJ PANTOPRAZOLE SODIUM, VIA: HCPCS | Performed by: INTERNAL MEDICINE

## 2024-05-30 PROCEDURE — 2000000000 HC ICU R&B

## 2024-05-30 PROCEDURE — 82962 GLUCOSE BLOOD TEST: CPT

## 2024-05-30 PROCEDURE — 83735 ASSAY OF MAGNESIUM: CPT

## 2024-05-30 PROCEDURE — 85025 COMPLETE CBC W/AUTO DIFF WBC: CPT

## 2024-05-30 PROCEDURE — 6360000002 HC RX W HCPCS

## 2024-05-30 PROCEDURE — 80048 BASIC METABOLIC PNL TOTAL CA: CPT

## 2024-05-30 PROCEDURE — 85730 THROMBOPLASTIN TIME PARTIAL: CPT

## 2024-05-30 PROCEDURE — 94003 VENT MGMT INPAT SUBQ DAY: CPT

## 2024-05-30 PROCEDURE — 2700000000 HC OXYGEN THERAPY PER DAY

## 2024-05-30 RX ORDER — LEVALBUTEROL INHALATION SOLUTION 0.63 MG/3ML
0.63 SOLUTION RESPIRATORY (INHALATION) 3 TIMES DAILY
Status: DISCONTINUED | OUTPATIENT
Start: 2024-05-30 | End: 2024-05-31 | Stop reason: HOSPADM

## 2024-05-30 RX ORDER — ESMOLOL HYDROCHLORIDE 10 MG/ML
25-300 INJECTION, SOLUTION INTRAVENOUS CONTINUOUS
Status: DISCONTINUED | OUTPATIENT
Start: 2024-05-30 | End: 2024-05-31 | Stop reason: HOSPADM

## 2024-05-30 RX ORDER — SODIUM CHLORIDE, SODIUM LACTATE, POTASSIUM CHLORIDE, CALCIUM CHLORIDE 600; 310; 30; 20 MG/100ML; MG/100ML; MG/100ML; MG/100ML
INJECTION, SOLUTION INTRAVENOUS CONTINUOUS
Status: DISCONTINUED | OUTPATIENT
Start: 2024-05-30 | End: 2024-05-31 | Stop reason: HOSPADM

## 2024-05-30 RX ORDER — SODIUM CHLORIDE 0.9 % (FLUSH) 0.9 %
5-40 SYRINGE (ML) INJECTION EVERY 12 HOURS SCHEDULED
Status: DISCONTINUED | OUTPATIENT
Start: 2024-05-30 | End: 2024-05-31

## 2024-05-30 RX ORDER — LIDOCAINE HYDROCHLORIDE 10 MG/ML
5 INJECTION, SOLUTION EPIDURAL; INFILTRATION; INTRACAUDAL; PERINEURAL ONCE
Status: DISCONTINUED | OUTPATIENT
Start: 2024-05-30 | End: 2024-05-31

## 2024-05-30 RX ORDER — SODIUM CHLORIDE 0.9 % (FLUSH) 0.9 %
5-40 SYRINGE (ML) INJECTION PRN
Status: DISCONTINUED | OUTPATIENT
Start: 2024-05-30 | End: 2024-05-31 | Stop reason: HOSPADM

## 2024-05-30 RX ORDER — SODIUM CHLORIDE 9 MG/ML
25 INJECTION, SOLUTION INTRAVENOUS PRN
Status: DISCONTINUED | OUTPATIENT
Start: 2024-05-30 | End: 2024-05-31 | Stop reason: HOSPADM

## 2024-05-30 RX ORDER — HEPARIN SODIUM 1000 [USP'U]/ML
60 INJECTION, SOLUTION INTRAVENOUS; SUBCUTANEOUS PRN
Status: DISCONTINUED | OUTPATIENT
Start: 2024-05-30 | End: 2024-05-30

## 2024-05-30 RX ORDER — HEPARIN SODIUM 1000 [USP'U]/ML
30 INJECTION, SOLUTION INTRAVENOUS; SUBCUTANEOUS PRN
Status: DISCONTINUED | OUTPATIENT
Start: 2024-05-30 | End: 2024-05-30

## 2024-05-30 RX ORDER — HEPARIN SODIUM 1000 [USP'U]/ML
60 INJECTION, SOLUTION INTRAVENOUS; SUBCUTANEOUS ONCE
Status: DISCONTINUED | OUTPATIENT
Start: 2024-05-30 | End: 2024-05-30

## 2024-05-30 RX ORDER — VERAPAMIL HYDROCHLORIDE 2.5 MG/ML
5 INJECTION, SOLUTION INTRAVENOUS EVERY 6 HOURS PRN
Status: DISCONTINUED | OUTPATIENT
Start: 2024-05-30 | End: 2024-05-31 | Stop reason: HOSPADM

## 2024-05-30 RX ORDER — SODIUM CHLORIDE 9 MG/ML
INJECTION, SOLUTION INTRAVENOUS PRN
OUTPATIENT
Start: 2024-05-30

## 2024-05-30 RX ORDER — HEPARIN SODIUM 10000 [USP'U]/100ML
5-30 INJECTION, SOLUTION INTRAVENOUS CONTINUOUS
Status: DISCONTINUED | OUTPATIENT
Start: 2024-05-30 | End: 2024-05-31 | Stop reason: HOSPADM

## 2024-05-30 RX ORDER — HEPARIN SODIUM 1000 [USP'U]/ML
2000 INJECTION, SOLUTION INTRAVENOUS; SUBCUTANEOUS PRN
Status: DISCONTINUED | OUTPATIENT
Start: 2024-05-30 | End: 2024-05-31 | Stop reason: HOSPADM

## 2024-05-30 RX ORDER — FUROSEMIDE 10 MG/ML
20 INJECTION INTRAMUSCULAR; INTRAVENOUS ONCE
Status: COMPLETED | OUTPATIENT
Start: 2024-05-30 | End: 2024-05-30

## 2024-05-30 RX ORDER — METOPROLOL TARTRATE 1 MG/ML
5 INJECTION, SOLUTION INTRAVENOUS EVERY 6 HOURS
Status: DISCONTINUED | OUTPATIENT
Start: 2024-05-30 | End: 2024-05-31 | Stop reason: HOSPADM

## 2024-05-30 RX ORDER — ACETYLCYSTEINE 200 MG/ML
600 SOLUTION ORAL; RESPIRATORY (INHALATION)
Status: DISCONTINUED | OUTPATIENT
Start: 2024-05-30 | End: 2024-05-31 | Stop reason: HOSPADM

## 2024-05-30 RX ORDER — SODIUM CHLORIDE 0.9 % (FLUSH) 0.9 %
5-40 SYRINGE (ML) INJECTION EVERY 12 HOURS SCHEDULED
OUTPATIENT
Start: 2024-05-30

## 2024-05-30 RX ORDER — HEPARIN SODIUM 10000 [USP'U]/100ML
5-30 INJECTION, SOLUTION INTRAVENOUS CONTINUOUS
Status: DISCONTINUED | OUTPATIENT
Start: 2024-05-30 | End: 2024-05-30

## 2024-05-30 RX ORDER — HEPARIN SODIUM 1000 [USP'U]/ML
4000 INJECTION, SOLUTION INTRAVENOUS; SUBCUTANEOUS PRN
Status: DISCONTINUED | OUTPATIENT
Start: 2024-05-30 | End: 2024-05-31 | Stop reason: HOSPADM

## 2024-05-30 RX ORDER — SODIUM CHLORIDE 0.9 % (FLUSH) 0.9 %
5-40 SYRINGE (ML) INJECTION PRN
OUTPATIENT
Start: 2024-05-30

## 2024-05-30 RX ORDER — METOPROLOL TARTRATE 1 MG/ML
INJECTION, SOLUTION INTRAVENOUS
Status: COMPLETED
Start: 2024-05-30 | End: 2024-05-30

## 2024-05-30 RX ORDER — FUROSEMIDE 10 MG/ML
INJECTION INTRAMUSCULAR; INTRAVENOUS
Status: COMPLETED
Start: 2024-05-30 | End: 2024-05-30

## 2024-05-30 RX ORDER — GLYCOPYRROLATE 0.2 MG/ML
0.2 INJECTION INTRAMUSCULAR; INTRAVENOUS 2 TIMES DAILY
Status: DISCONTINUED | OUTPATIENT
Start: 2024-05-30 | End: 2024-05-31 | Stop reason: HOSPADM

## 2024-05-30 RX ADMIN — VERAPAMIL HYDROCHLORIDE 5 MG: 2.5 INJECTION, SOLUTION INTRAVENOUS at 15:56

## 2024-05-30 RX ADMIN — FUROSEMIDE 20 MG: 10 INJECTION INTRAMUSCULAR; INTRAVENOUS at 10:19

## 2024-05-30 RX ADMIN — ATORVASTATIN CALCIUM 20 MG: 20 TABLET, FILM COATED ORAL at 08:34

## 2024-05-30 RX ADMIN — METOPROLOL TARTRATE 5 MG: 1 INJECTION, SOLUTION INTRAVENOUS at 15:22

## 2024-05-30 RX ADMIN — IPRATROPIUM BROMIDE 2 PUFF: 17 AEROSOL, METERED RESPIRATORY (INHALATION) at 06:09

## 2024-05-30 RX ADMIN — FUROSEMIDE 20 MG: 10 INJECTION, SOLUTION INTRAMUSCULAR; INTRAVENOUS at 10:19

## 2024-05-30 RX ADMIN — DIGOXIN 125 MCG: 250 TABLET ORAL at 08:34

## 2024-05-30 RX ADMIN — LEVALBUTEROL HYDROCHLORIDE 0.63 MG: 0.63 SOLUTION RESPIRATORY (INHALATION) at 14:49

## 2024-05-30 RX ADMIN — BUDESONIDE AND FORMOTEROL FUMARATE DIHYDRATE 2 PUFF: 160; 4.5 AEROSOL RESPIRATORY (INHALATION) at 06:09

## 2024-05-30 RX ADMIN — MICONAZOLE NITRATE: 2 POWDER TOPICAL at 08:30

## 2024-05-30 RX ADMIN — ACETYLCYSTEINE 600 MG: 200 SOLUTION ORAL; RESPIRATORY (INHALATION) at 14:49

## 2024-05-30 RX ADMIN — GLYCOPYRROLATE 0.2 MG: 0.2 INJECTION INTRAMUSCULAR; INTRAVENOUS at 14:43

## 2024-05-30 RX ADMIN — ACETYLCYSTEINE 600 MG: 200 SOLUTION ORAL; RESPIRATORY (INHALATION) at 18:26

## 2024-05-30 RX ADMIN — DEXMEDETOMIDINE HYDROCHLORIDE 0.8 MCG/KG/HR: 400 INJECTION, SOLUTION INTRAVENOUS at 00:16

## 2024-05-30 RX ADMIN — APIXABAN 5 MG: 5 TABLET, FILM COATED ORAL at 08:34

## 2024-05-30 RX ADMIN — ESMOLOL HYDROCHLORIDE 150 MCG/KG/MIN: 10 INJECTION INTRAVENOUS at 22:50

## 2024-05-30 RX ADMIN — MICONAZOLE NITRATE: 2 POWDER TOPICAL at 20:16

## 2024-05-30 RX ADMIN — Medication 5000 UNITS: at 08:34

## 2024-05-30 RX ADMIN — 0.12% CHLORHEXIDINE GLUCONATE 15 ML: 1.2 RINSE ORAL at 20:17

## 2024-05-30 RX ADMIN — LEVALBUTEROL HYDROCHLORIDE 0.63 MG: 0.63 SOLUTION RESPIRATORY (INHALATION) at 18:27

## 2024-05-30 RX ADMIN — DEXTROSE MONOHYDRATE 125 ML: 100 INJECTION, SOLUTION INTRAVENOUS at 18:00

## 2024-05-30 RX ADMIN — METOPROLOL TARTRATE 5 MG: 5 INJECTION INTRAVENOUS at 15:22

## 2024-05-30 RX ADMIN — MICONAZOLE NITRATE: 2 POWDER TOPICAL at 21:00

## 2024-05-30 RX ADMIN — WHITE PETROLATUM: 1.75 OINTMENT TOPICAL at 20:17

## 2024-05-30 RX ADMIN — DEXMEDETOMIDINE HYDROCHLORIDE 0.8 MCG/KG/HR: 400 INJECTION, SOLUTION INTRAVENOUS at 05:01

## 2024-05-30 RX ADMIN — SODIUM CHLORIDE, PRESERVATIVE FREE 40 MG: 5 INJECTION INTRAVENOUS at 14:38

## 2024-05-30 RX ADMIN — HEPARIN SODIUM 10 UNITS/KG/HR: 10000 INJECTION, SOLUTION INTRAVENOUS at 20:16

## 2024-05-30 RX ADMIN — WHITE PETROLATUM: 1.75 OINTMENT TOPICAL at 00:16

## 2024-05-30 RX ADMIN — METOPROLOL SUCCINATE 100 MG: 50 TABLET, EXTENDED RELEASE ORAL at 08:34

## 2024-05-30 RX ADMIN — IPRATROPIUM BROMIDE 2 PUFF: 17 AEROSOL, METERED RESPIRATORY (INHALATION) at 10:47

## 2024-05-30 RX ADMIN — ESMOLOL HYDROCHLORIDE 50 MCG/KG/MIN: 10 INJECTION INTRAVENOUS at 18:48

## 2024-05-30 RX ADMIN — DEXTROSE MONOHYDRATE 125 ML: 100 INJECTION, SOLUTION INTRAVENOUS at 15:48

## 2024-05-30 RX ADMIN — GLYCOPYRROLATE 0.2 MG: 0.2 INJECTION INTRAMUSCULAR; INTRAVENOUS at 20:17

## 2024-05-30 RX ADMIN — SODIUM CHLORIDE, SODIUM LACTATE, POTASSIUM CHLORIDE, AND CALCIUM CHLORIDE: 600; 310; 30; 20 INJECTION, SOLUTION INTRAVENOUS at 17:53

## 2024-05-30 ASSESSMENT — PULMONARY FUNCTION TESTS
PIF_VALUE: 31
PIF_VALUE: 27
PIF_VALUE: 12
PIF_VALUE: 13
PIF_VALUE: 28
PIF_VALUE: 29
PIF_VALUE: 12
PIF_VALUE: 29
PIF_VALUE: 27
PIF_VALUE: 31
PIF_VALUE: 13
PIF_VALUE: 22
PIF_VALUE: 28
PIF_VALUE: 31
PIF_VALUE: 22
PIF_VALUE: 12

## 2024-05-30 NOTE — PROGRESS NOTES
[facility] MEDICINE  - PROGRESS NOTE    Admit Date: 5/2/2024 5/30/2024    Subjective: conversant but confused    Objective:   Vitals: /63   Pulse (!) 122   Temp 97.2 °F (36.2 °C) (Axillary)   Resp 19   Ht 1.575 m (5' 2\")   Wt 99.1 kg (218 lb 6.4 oz)   SpO2 97%   BMI 39.95 kg/m²   General appearance: alert, appears stated age and cooperative  Skin: Skin color, texture, turgor normal.   HEENT: Head: Normocephalic, no lesions, without obvious abnormality.  Neck: no adenopathy, no carotid bruit, no JVD, supple, symmetrical, trachea midline, and thyroid not enlarged, symmetric, no tenderness/mass/nodules  Lungs: clear to auscultation bilaterally  Heart: irregularly irregular rhythm  Abdomen: soft, non-tender; bowel sounds normal; no masses,  no organomegaly  Extremities: extremities normal, atraumatic, no cyanosis or edema  Lymphatic: No significant lymph node enlargement papable  Neurologic: Mental status: not oriented      Data:   Scheduled Meds: Reviewed  Continuous Infusions:   dexmedeTOMIDine HCl in NaCl Stopped (05/30/24 0937)    sodium chloride      DOBUTamine      dextrose 100 mL/hr at 05/15/24 0532       Intake/Output Summary (Last 24 hours) at 5/30/2024 1357  Last data filed at 5/30/2024 1200  Gross per 24 hour   Intake 3515.89 ml   Output 825 ml   Net 2690.89 ml     CBC:   Recent Labs     05/30/24 0435   WBC 14.4*   HGB 10.0*        BMP:  Recent Labs     05/30/24  0435 05/30/24  0914 05/30/24  1034   *  --   --    K 3.6   < > 3.3   CL 95*  --   --    CO2 27  --   --    BUN 68*  --   --    CREATININE 0.8  --   --    GLUCOSE 265*  --   --     < > = values in this interval not displayed.     ABGs:   Lab Results   Component Value Date/Time    PHART 7.520 05/30/2024 10:34 AM    PO2ART 91.0 05/30/2024 10:34 AM    YSE0HMH 42.0 05/30/2024 10:34 AM     INR: No results for input(s): \"INR\" in the last 72

## 2024-05-30 NOTE — PROGRESS NOTES
Multiple unsuccessful attempts to place NG tube for med administration and tube feeds x multiple nurses.  Notified Dr Perkins for alternative HR control meds.  New orders rec'd.    Electronically signed by Tena Jimenez RN on 5/30/2024 at 5:13 PM

## 2024-05-30 NOTE — PROGRESS NOTES
Patient placed on SBT by Dr. Lezama during rounding.    Electronically signed by Tena Jimenez RN on 5/30/2024 at 8:03 AM

## 2024-05-30 NOTE — PROGRESS NOTES
dullness to percussion  Abdomen:Soft non tender no organomegaly  Extremities:no clubbing cyanosis or edema  Neuro: Sedated  Skin:intact        The following lab data was reviewed:  CBC   Recent Labs     05/28/24 0207 05/29/24  0330 05/30/24 0435   WBC 11.6* 13.6* 14.4*   RBC 4.09* 3.99* 3.99*   HGB 10.3* 10.1* 10.0*   HCT 31.0* 30.8* 30.4*    256 243   MCV 75.8* 77.2* 76.2*   MCH 25.2* 25.3* 25.1*   MCHC 33.2 32.8* 32.9*   RDW 14.6* 14.8* 14.7*      BMP   Recent Labs     05/28/24  0207 05/28/24  0359 05/29/24  1140 05/30/24  0425 05/30/24  0435 05/30/24  0914 05/30/24  1034   *  --  135*  --  135*  --   --    K 6.0*   < > 3.5 3.2 3.6 3.2 3.3   CL 97*  --  96*  --  95*  --   --    CO2 21*  --  25  --  27  --   --    BUN 54*  --  58*  --  68*  --   --    CREATININE 1.0*  --  0.8  --  0.8  --   --    CALCIUM 8.5*  --  7.5*  --  7.4*  --   --    GLUCOSE 339*  --  399*  --  265*  --   --     < > = values in this interval not displayed.      ABG   Recent Labs     05/28/24  0958 05/30/24  0425 05/30/24  0914 05/30/24  1034   PHART 7.490* 7.580* 7.530* 7.520*   YQY0ZOM 32.0* 36.0 42.0 42.0   PO2ART 72.0* 69.0* 72.0* 91.0   XYW4RJO 24.4 33.8* 35.1* 34.3*   I3JZECLB 93.8 93.7 95.0 95.6   BEART 1.4 11.1* 11.3* 10.4*     Liver function studies and cardiac markers   Recent Labs     05/30/24  0435   MG 1.8   CALCIUM 7.4*     Microbiology and Cultures No results for input(s): \"BC\", \"LABGRAM\", \"CULTRESP\", \"BFCX\" in the last 72 hours.      Radiographs reviewed:     XR CHEST PORTABLE    Result Date: 5/30/2024  Stable support equipment with left effusion and adjacent atelectasis versus consolidation.  Support equipment is stable.    ______________________________________ Electronically signed by: VEDA DORADO M.D. Date:     05/30/2024 Time:    07:27     XR CHEST PORTABLE    Result Date: 5/28/2024  No interval change    ______________________________________ Electronically signed by: VEDA DORADO M.D. Date:

## 2024-05-30 NOTE — PROGRESS NOTES
Heparin Infusion Monitoring Note:  Due to recent anticoagulant use, the heparin infusion will be monitoring using an aPTT-based algorithm.  Please refer to the MAR for adjustment details of the heparin infusion.    Details of prior anticoagulant use:    Name of anticoagulant: Eliquis  Last dose: 05/30 at 0834    At 72 hours following the last anticoagulant administration, an anti-Xa based nomogram will be ordered to replace the aPTT nomogram.  This will occur on 06/02 at 0900.    Electronically signed by Tila Forerster RPH on 5/30/2024 at 6:45 PM

## 2024-05-30 NOTE — PROGRESS NOTES
PRN Jewel Elias APRN - CRNA        0.9 % sodium chloride infusion   IntraVENous PRN Jewel Elias APRN - CRNA        nitroGLYCERIN (NITROSTAT) SL tablet 0.4 mg  0.4 mg SubLINGual Q5 Min PRN Juan Manuel Maradiaga MD   0.4 mg at 05/22/24 0541    magic butt cream   Topical Q4H PRN Juan Manuel Maradiaga MD   Given at 05/24/24 2059    diphenhydrAMINE (BENADRYL) tablet 25 mg  25 mg Oral Q6H PRN Sincere Núñez MD   25 mg at 05/17/24 0942    urea (URE-NA) packet 15 g  15 g Oral Daily Sincere Núñez MD   15 g at 05/29/24 1200    DOBUTamine (DOBUTREX) 500 mg in dextrose 5 % 250 mL infusion  2.5 mcg/kg/min IntraVENous Continuous PRN Cruz Perkins MD        ondansetron (ZOFRAN) injection 4 mg  4 mg IntraVENous Q6H PRN Rasta Madsen MD        sennosides-docusate sodium (SENOKOT-S) 8.6-50 MG tablet 1 tablet  1 tablet Oral Daily PRN Rasta Madsen MD        miconazole (MICOTIN) 2 % powder   Topical BID Rasta Madsen MD   Given at 05/30/24 0830    apixaban (ELIQUIS) tablet 5 mg  5 mg Oral BID Darwin Ledesma DO   5 mg at 05/30/24 0834    naloxone (NARCAN) injection 0.4 mg  0.4 mg IntraVENous PRN Darwin Ledesma DO   0.4 mg at 05/03/24 0316    diclofenac sodium (VOLTAREN) 1 % gel 2 g  2 g Topical TID PRN Darwin Ledesma DO   2 g at 05/20/24 0636    vitamin D (ERGOCALCIFEROL) capsule 50,000 Units  50,000 Units Oral Weekly Darwin Ledesma DO   50,000 Units at 05/24/24 0943    vitamin D (CHOLECALCIFEROL) capsule 5,000 Units  5,000 Units Oral Daily Darwin Ledesma DO   5,000 Units at 05/30/24 0834    glucose chewable tablet 16 g  4 tablet Oral PRN Darwin Ledesma DO   16 g at 05/15/24 0456    dextrose bolus 10% 125 mL  125 mL IntraVENous PRDarwin Carabalol,    Stopped at 05/20/24 0513    Or    dextrose bolus 10% 250 mL  250 mL IntraVENous PRDarwin Caraballo DO        glucagon injection 1 mg  1 mg SubCUTAneous PRDarwin Caraballo DO        dextrose 10 % infusion   IntraVENous Continuous  ______________________________________ Electronically signed by: VEDA DORADO M.D. Date:     05/28/2024 Time:    09:51      Assessment and Plan:   78-year-old female with history of dementia initially admitted to critical care unit for COVID-19 infection, acute hypoxemic respiratory failure, and chronic AF with RVR with subsequent cardiac arrest.     Cardiology/EP following  Tentative plans for ablation/PPM placement  Meds per cardiology/EP  Extubated 5/30/2024  Supplemental O2 as warranted to maintain goal sats  Pulmonary following  Palliative following for goals of care  Eliquis covering DVT prophylaxis  Discussed treatment plan with kindra/RN     Total critical care time: 52 minutes    The above note was generated using voice recognition software. Inadvertent typographical errors in transcription may have occurred.    Juan Manuel Maradiaga MD   5/30/2024 1:03 PM

## 2024-05-30 NOTE — PROGRESS NOTES
Palliative Care Progress Note  5/30/2024 10:45 AM    Patient:  Edie Cat  YOB: 1945  Primary Care Physician: Dudley Eduardo  Advance Directive: Full Code  Admit Date: 5/2/2024       Hospital Day: 28  Portions of this note have been copied forward, however, changed to reflect the most current clinical status of this patient.    CHIEF COMPLAINT/REASON FOR CONSULTATION Goals of care, family support, Code status, symptom management     SUBJECTIVE:  Ms. Cat is awake, alert. Remains intubated w/ plans for extubation this morning.     HPI:  The patient is a 78 y.o. female with PMH dementia, persistent atrial fibrillation, DM II, blindness bilaterally, hypothyroidism, CVA, PE who presented to [facility] on 05/02/2024 from OSH where work up was concerning for COVID-19, Afib RVR, acute hypoxemic respiratory failure.  She is in manage to hospital service and was placed on dexamethasone, pharmacy to dose tocilizumab and Cardizem drip.  She then became bradycardic, medication adjusted and that was resolved.  Plans were for discharge to nursing facility on 5/7/2023, however, she had a change of condition with worsening dyspnea and elevated heart rate.  Solu-Medrol ordered.  Cardiology consulted for persistent A-fib with rapid rates.  Multiple medication adjustments have been made and rate has been difficult to control.  She has had fluctuating tachycardia as well as bradycardia.  She has been continued on Eliquis.  COVID isolation discontinued on 5/20/2024.  She underwent SAMANTHA and no thrombus was noted in the right atrium, left atrial appendage free of thrombus, mild aortic stenosis, EF 45%, small slitlike PFO without significant right to left shunting appreciated, DCCV performed on 5/21/2024.  Sinus rhythm was noted, however, overnight the patient reverted back to A-fib RVR.  Cardiology has reevaluated and has discussed possible AV shweta ablation with pacemaker placement.  Patient remains undecided.  Palliative  Mild malnutrition (HCC)    Itching    Generalized weakness    Rash    Visit Summary:  Chart reviewed.  Discussed with ICU RN.  No acute overnight events noted.  Ms. Wells is awake alert and able to follow commands.  She is still intubated but there are plans for extubation this morning.  Her daughter has been updated and has confirmed that she would want the patient reintubated should she not do well postextubation.  Will need to evaluate swallowing and nutritional support and continue therapy services as tolerated while awaiting procedure next week.    Palliative team will follow as needed.    Recommendations:   Palliative Care-GOC prolong life-move forward w/ ablation/pacemaker placement as soon as OK w/ Cardiology, plans for placement if/when medically stable for discharge following procedure Code status: Full code -plans for additional conversation based on further hospital course  A-fib RVR-Eliquis continued-plan to hold over the weekend in preparation for procedure, Cardiology following, s/p unsuccessful SAMANTHA/DCCV 05/21/2024, plans to move forward w/ ablation/dual-chamber pacemaker placement next week  Diffuse pruritic rash-resolving, suspect 2/2 Adverse rxn from Cardizem  COVID-19 with acute hypoxemic respiratory failure-received dexamethasone and tocilizumab, intubated 05/26/2024, SBT's ongoing, plan for extubation 05/30/2024  TAJ on CKD now w/ concern for hyperkalemia-hospitalist mgmt, resolved, monitor   SIADH-noted, 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     Total Time Spent with patient assessment, interview of independent historian/HCS, workup/treatment review, discussion with medical team, review of current and home medications, and placement of orders/preparation of this note:35 minutes

## 2024-05-30 NOTE — PROGRESS NOTES
Cardiology Progress Note Cruz Perkins MD      Patient:  Edie Cat  [id number]    Patient Active Problem List    Diagnosis Date Noted    Atrial fibrillation, persistent (HCC) 05/02/2024     Priority: High    Itching 05/29/2024     Priority: Low    Generalized weakness 05/29/2024     Priority: Low    Rash 05/29/2024     Priority: Low    Mild malnutrition (HCC) 05/27/2024     Priority: Low    Palliative care patient 05/22/2024     Priority: Low    Rash in adult 05/22/2024     Priority: Low    Diabetes mellitus (HCC) 09/26/2011     Priority: Low     Overview Note:     replace inactive diagnosis      Hyperglycemia 09/26/2011     Priority: Low    Morbid obesity (HCC) 09/26/2011     Priority: Low    History of stroke 09/26/2011     Priority: Low    Pulmonary embolus (HCC) 09/26/2011     Priority: Low    Systemic hypertension 09/26/2011     Priority: Low    Functional blindness 09/26/2011     Priority: Low    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     Priority: Low    Arthritis, degenerative 09/26/2011     Priority: Low    Dementia (HCC) 09/26/2011     Priority: Low    Former cigarette smoker 09/26/2011     Priority: Low    Psoriasis 09/26/2011     Priority: Low    Hyponatremia 09/26/2011     Priority: Low    Vitamin D deficiency 09/26/2011     Priority: Low    Atrial fibrillation (HCC) 08/12/2011     Priority: Low     Overview Note:     8/12/2011 DCCV on aminodarone      CAD (coronary artery disease) 08/12/2011     Priority: Low     Overview Note:     8/15/2011  cardiolyte negative for myocardial ischemia, EF  66%         Admit Date:  5/2/2024    Admission Problem List: Present on Admission:   (Resolved) COVID-19   Palliative care patient   Dementia (HCC)   Diabetes mellitus (HCC)   Functional blindness   Psoriasis   Vitamin D deficiency   Rash in adult   Mild malnutrition (HCC)   Itching   Generalized weakness   Rash      Cardiac Specific Data:  Specialty Problems          Cardiology Problems  of the right hemidiaphragm.      Impression:  Bilateral airspace opacities as described, consistent with edema versus infection.  Cardiomegaly.     ______________________________________ Electronically signed by: CHASTITY ROACH M.D. Date:     05/03/2024 Time:    04:11         Assessment and Plan:    This is a 78 y.o. year old female with past medical history of moderate dementia, nursing home resident, persistent atrial fibrillation, previously on amiodarone and Coumadin, normal LV ejection fraction, insulin requiring diabetes mellitus, admitted with COVID infection and noted to be in atrial fibrillation/flutter with RVR with difficult rate control, undergoing SAMANTHA/DCCV 5/21/2024 with now recurrent atrial fibrillation/atypical flutter with RVR, exfoliative dermatitis possibly related to Cardizem, bradycardia arrest 5/26/2024 with IV amiodarone.     1.  Unable to take oral with difficulty with NG tube placement.  Can start esmolol drip if IV Lopressor not effective at controlling rates.  Can bolus verapamil 5 mg IV every 6 as needed.  Check digoxin level in a.m. prior to dosing.  Maintain on anticoagulation and can use IV heparin and hold Eliquis.    Cruz Perkins MD, MD 5/30/2024 6:09 PM    Thisdictation was generated by voice recognition computer software.  Although all attempts are made to edit the dictation for accuracy, there may be errors in the transcription that are not intended.

## 2024-05-31 ENCOUNTER — APPOINTMENT (OUTPATIENT)
Dept: GENERAL RADIOLOGY | Age: 79
End: 2024-05-31
Attending: INTERNAL MEDICINE
Payer: MEDICARE

## 2024-05-31 VITALS
DIASTOLIC BLOOD PRESSURE: 48 MMHG | HEIGHT: 62 IN | SYSTOLIC BLOOD PRESSURE: 102 MMHG | RESPIRATION RATE: 26 BRPM | HEART RATE: 111 BPM | TEMPERATURE: 98.5 F | WEIGHT: 218.4 LBS | BODY MASS INDEX: 40.19 KG/M2 | OXYGEN SATURATION: 89 %

## 2024-05-31 PROBLEM — J96.01 ACUTE RESPIRATORY FAILURE WITH HYPOXIA (HCC): Status: ACTIVE | Noted: 2024-05-31

## 2024-05-31 LAB
ALLENS TEST: ABNORMAL
ANION GAP SERPL CALCULATED.3IONS-SCNC: 10 MMOL/L (ref 7–19)
APTT PPP: 123.6 SEC (ref 26–36.2)
APTT PPP: 85.7 SEC (ref 26–36.2)
BACTERIA #/AREA URNS HPF: ABNORMAL /HPF
BASE EXCESS ARTERIAL: 9.9 MMOL/L (ref -2–2)
BASOPHILS # BLD: 0 K/UL (ref 0–0.2)
BASOPHILS NFR BLD: 0.1 % (ref 0–1)
BILIRUB UR QL STRIP: NEGATIVE
BUN SERPL-MCNC: 79 MG/DL (ref 8–23)
CALCIUM SERPL-MCNC: 7.5 MG/DL (ref 8.8–10.2)
CARBOXYHEMOGLOBIN ARTERIAL: 2.8 % (ref 0–5)
CHLORIDE SERPL-SCNC: 93 MMOL/L (ref 98–111)
CLARITY UR: ABNORMAL
CO2 SERPL-SCNC: 31 MMOL/L (ref 22–29)
COLOR UR: ABNORMAL
CREAT SERPL-MCNC: 1.4 MG/DL (ref 0.5–0.9)
CREAT UR-MCNC: 127.9 MG/DL (ref 28–217)
DIGOXIN SERPL-MCNC: 1.7 NG/ML (ref 0.6–1.2)
EOSINOPHIL # BLD: 0 K/UL (ref 0–0.6)
EOSINOPHIL NFR BLD: 0.1 % (ref 0–5)
EOSINOPHIL URNS QL MICRO: NORMAL
ERYTHROCYTE [DISTWIDTH] IN BLOOD BY AUTOMATED COUNT: 15 % (ref 11.5–14.5)
FIO2: 50 %
GLUCOSE BLD-MCNC: 111 MG/DL (ref 70–99)
GLUCOSE BLD-MCNC: 76 MG/DL (ref 70–99)
GLUCOSE BLD-MCNC: 97 MG/DL (ref 70–99)
GLUCOSE BLD-MCNC: 98 MG/DL (ref 70–99)
GLUCOSE SERPL-MCNC: 82 MG/DL (ref 74–109)
GLUCOSE UR STRIP.AUTO-MCNC: NEGATIVE MG/DL
HCO3 ARTERIAL: 33.4 MMOL/L (ref 22–26)
HCT VFR BLD AUTO: 32.8 % (ref 37–47)
HEMOGLOBIN, ART, EXTENDED: 10.6 G/DL (ref 12–16)
HGB BLD-MCNC: 10.2 G/DL (ref 12–16)
HGB UR STRIP.AUTO-MCNC: ABNORMAL MG/L
IMM GRANULOCYTES # BLD: 0.1 K/UL
KETONES UR STRIP.AUTO-MCNC: NEGATIVE MG/DL
LEUKOCYTE ESTERASE UR QL STRIP.AUTO: ABNORMAL
LYMPHOCYTES # BLD: 1.9 K/UL (ref 1.1–4.5)
LYMPHOCYTES NFR BLD: 10.3 % (ref 20–40)
MAGNESIUM SERPL-MCNC: 1.8 MG/DL (ref 1.6–2.4)
MCH RBC QN AUTO: 24.9 PG (ref 27–31)
MCHC RBC AUTO-ENTMCNC: 31.1 G/DL (ref 33–37)
MCV RBC AUTO: 80.2 FL (ref 81–99)
METHEMOGLOBIN ARTERIAL: 1 %
MONOCYTES # BLD: 1.5 K/UL (ref 0–0.9)
MONOCYTES NFR BLD: 7.8 % (ref 0–10)
NEUTROPHILS # BLD: 15.3 K/UL (ref 1.5–7.5)
NEUTS SEG NFR BLD: 81.3 % (ref 50–65)
NITRITE UR QL STRIP.AUTO: NEGATIVE
O2 CONTENT ARTERIAL: 13.9 ML/DL
O2 DELIVERY DEVICE: ABNORMAL
O2 SAT, ARTERIAL: 93.1 %
O2 THERAPY: ABNORMAL
OSMOLALITY URINE: 408 MOSM/KG (ref 250–1200)
OXYGEN FLOW: 25
PCO2 ARTERIAL: 40 MMHG (ref 35–45)
PERFORMED ON: ABNORMAL
PERFORMED ON: NORMAL
PH ARTERIAL: 7.53 (ref 7.35–7.45)
PH UR STRIP.AUTO: 5 [PH] (ref 5–8)
PLATELET # BLD AUTO: 438 K/UL (ref 130–400)
PMV BLD AUTO: 9.5 FL (ref 9.4–12.3)
PO2 ARTERIAL: 69 MMHG (ref 80–100)
POTASSIUM BLD-SCNC: 3.8 MMOL/L
POTASSIUM SERPL-SCNC: 3.9 MMOL/L (ref 3.5–5)
PROT UR STRIP.AUTO-MCNC: 100 MG/DL
RBC # BLD AUTO: 4.09 M/UL (ref 4.2–5.4)
RBC #/AREA URNS HPF: ABNORMAL /HPF (ref 0–2)
SAMPLE SOURCE: ABNORMAL
SODIUM SERPL-SCNC: 134 MMOL/L (ref 136–145)
SODIUM UR-SCNC: <20 MMOL/L
SP GR UR STRIP.AUTO: 1.02 (ref 1–1.03)
SQUAMOUS #/AREA URNS HPF: ABNORMAL /HPF
UROBILINOGEN UR STRIP.AUTO-MCNC: 1 E.U./DL
WBC # BLD AUTO: 18.8 K/UL (ref 4.8–10.8)
WBC #/AREA URNS HPF: ABNORMAL /HPF (ref 0–5)

## 2024-05-31 PROCEDURE — 36600 WITHDRAWAL OF ARTERIAL BLOOD: CPT

## 2024-05-31 PROCEDURE — 6360000002 HC RX W HCPCS: Performed by: INTERNAL MEDICINE

## 2024-05-31 PROCEDURE — 84300 ASSAY OF URINE SODIUM: CPT

## 2024-05-31 PROCEDURE — 36415 COLL VENOUS BLD VENIPUNCTURE: CPT

## 2024-05-31 PROCEDURE — 80162 ASSAY OF DIGOXIN TOTAL: CPT

## 2024-05-31 PROCEDURE — 71045 X-RAY EXAM CHEST 1 VIEW: CPT

## 2024-05-31 PROCEDURE — 2580000003 HC RX 258: Performed by: HOSPITALIST

## 2024-05-31 PROCEDURE — 80048 BASIC METABOLIC PNL TOTAL CA: CPT

## 2024-05-31 PROCEDURE — 6370000000 HC RX 637 (ALT 250 FOR IP): Performed by: INTERNAL MEDICINE

## 2024-05-31 PROCEDURE — 87077 CULTURE AEROBIC IDENTIFY: CPT

## 2024-05-31 PROCEDURE — 82803 BLOOD GASES ANY COMBINATION: CPT

## 2024-05-31 PROCEDURE — 6360000002 HC RX W HCPCS: Performed by: PHYSICIAN ASSISTANT

## 2024-05-31 PROCEDURE — 85730 THROMBOPLASTIN TIME PARTIAL: CPT

## 2024-05-31 PROCEDURE — C9113 INJ PANTOPRAZOLE SODIUM, VIA: HCPCS | Performed by: INTERNAL MEDICINE

## 2024-05-31 PROCEDURE — 82962 GLUCOSE BLOOD TEST: CPT

## 2024-05-31 PROCEDURE — 99498 ADVNCD CARE PLAN ADDL 30 MIN: CPT | Performed by: PHYSICIAN ASSISTANT

## 2024-05-31 PROCEDURE — 82570 ASSAY OF URINE CREATININE: CPT

## 2024-05-31 PROCEDURE — 99232 SBSQ HOSP IP/OBS MODERATE 35: CPT | Performed by: INTERNAL MEDICINE

## 2024-05-31 PROCEDURE — 99497 ADVNCD CARE PLAN 30 MIN: CPT | Performed by: PHYSICIAN ASSISTANT

## 2024-05-31 PROCEDURE — 94760 N-INVAS EAR/PLS OXIMETRY 1: CPT

## 2024-05-31 PROCEDURE — 87186 SC STD MICRODIL/AGAR DIL: CPT

## 2024-05-31 PROCEDURE — 87205 SMEAR GRAM STAIN: CPT

## 2024-05-31 PROCEDURE — 99233 SBSQ HOSP IP/OBS HIGH 50: CPT | Performed by: PHYSICIAN ASSISTANT

## 2024-05-31 PROCEDURE — 2580000003 HC RX 258: Performed by: INTERNAL MEDICINE

## 2024-05-31 PROCEDURE — 87086 URINE CULTURE/COLONY COUNT: CPT

## 2024-05-31 PROCEDURE — 94640 AIRWAY INHALATION TREATMENT: CPT

## 2024-05-31 PROCEDURE — 85025 COMPLETE CBC W/AUTO DIFF WBC: CPT

## 2024-05-31 PROCEDURE — 83935 ASSAY OF URINE OSMOLALITY: CPT

## 2024-05-31 PROCEDURE — 83735 ASSAY OF MAGNESIUM: CPT

## 2024-05-31 PROCEDURE — 81001 URINALYSIS AUTO W/SCOPE: CPT

## 2024-05-31 RX ORDER — SODIUM CHLORIDE, SODIUM LACTATE, POTASSIUM CHLORIDE, AND CALCIUM CHLORIDE .6; .31; .03; .02 G/100ML; G/100ML; G/100ML; G/100ML
500 INJECTION, SOLUTION INTRAVENOUS ONCE
Status: COMPLETED | OUTPATIENT
Start: 2024-05-31 | End: 2024-05-31

## 2024-05-31 RX ORDER — HYDROMORPHONE HYDROCHLORIDE 1 MG/ML
0.25 INJECTION, SOLUTION INTRAMUSCULAR; INTRAVENOUS; SUBCUTANEOUS
Status: DISCONTINUED | OUTPATIENT
Start: 2024-05-31 | End: 2024-05-31 | Stop reason: HOSPADM

## 2024-05-31 RX ORDER — HYDROMORPHONE HYDROCHLORIDE 1 MG/ML
0.5 INJECTION, SOLUTION INTRAMUSCULAR; INTRAVENOUS; SUBCUTANEOUS
Status: DISCONTINUED | OUTPATIENT
Start: 2024-05-31 | End: 2024-05-31 | Stop reason: HOSPADM

## 2024-05-31 RX ORDER — GLYCOPYRROLATE 0.2 MG/ML
0.2 INJECTION INTRAMUSCULAR; INTRAVENOUS 3 TIMES DAILY PRN
Status: DISCONTINUED | OUTPATIENT
Start: 2024-05-31 | End: 2024-05-31 | Stop reason: HOSPADM

## 2024-05-31 RX ADMIN — LEVALBUTEROL HYDROCHLORIDE 0.63 MG: 0.63 SOLUTION RESPIRATORY (INHALATION) at 06:33

## 2024-05-31 RX ADMIN — ACETYLCYSTEINE 600 MG: 200 SOLUTION ORAL; RESPIRATORY (INHALATION) at 06:33

## 2024-05-31 RX ADMIN — HYDROMORPHONE HYDROCHLORIDE 0.25 MG: 1 INJECTION, SOLUTION INTRAMUSCULAR; INTRAVENOUS; SUBCUTANEOUS at 11:00

## 2024-05-31 RX ADMIN — HYDROMORPHONE HYDROCHLORIDE 0.5 MG: 1 INJECTION, SOLUTION INTRAMUSCULAR; INTRAVENOUS; SUBCUTANEOUS at 13:53

## 2024-05-31 RX ADMIN — ESMOLOL HYDROCHLORIDE 175 MCG/KG/MIN: 10 INJECTION INTRAVENOUS at 01:12

## 2024-05-31 RX ADMIN — ESMOLOL HYDROCHLORIDE 200 MCG/KG/MIN: 10 INJECTION INTRAVENOUS at 13:56

## 2024-05-31 RX ADMIN — SODIUM CHLORIDE, POTASSIUM CHLORIDE, SODIUM LACTATE AND CALCIUM CHLORIDE 500 ML: 600; 310; 30; 20 INJECTION, SOLUTION INTRAVENOUS at 03:50

## 2024-05-31 RX ADMIN — ESMOLOL HYDROCHLORIDE 225 MCG/KG/MIN: 10 INJECTION INTRAVENOUS at 07:56

## 2024-05-31 RX ADMIN — ESMOLOL HYDROCHLORIDE 175 MCG/KG/MIN: 10 INJECTION INTRAVENOUS at 03:36

## 2024-05-31 RX ADMIN — SODIUM CHLORIDE, PRESERVATIVE FREE 40 MG: 5 INJECTION INTRAVENOUS at 08:14

## 2024-05-31 RX ADMIN — ESMOLOL HYDROCHLORIDE 200 MCG/KG/MIN: 10 INJECTION INTRAVENOUS at 11:44

## 2024-05-31 RX ADMIN — GLYCOPYRROLATE 0.2 MG: 0.2 INJECTION INTRAMUSCULAR; INTRAVENOUS at 10:59

## 2024-05-31 RX ADMIN — ESMOLOL HYDROCHLORIDE 150 MCG/KG/MIN: 10 INJECTION INTRAVENOUS at 16:01

## 2024-05-31 RX ADMIN — 0.12% CHLORHEXIDINE GLUCONATE 15 ML: 1.2 RINSE ORAL at 08:14

## 2024-05-31 RX ADMIN — LEVALBUTEROL HYDROCHLORIDE 0.63 MG: 0.63 SOLUTION RESPIRATORY (INHALATION) at 14:12

## 2024-05-31 RX ADMIN — ESMOLOL HYDROCHLORIDE 225 MCG/KG/MIN: 10 INJECTION INTRAVENOUS at 09:53

## 2024-05-31 RX ADMIN — Medication 10 ML: at 08:14

## 2024-05-31 RX ADMIN — ESMOLOL HYDROCHLORIDE 200 MCG/KG/MIN: 10 INJECTION INTRAVENOUS at 05:44

## 2024-05-31 RX ADMIN — MICONAZOLE NITRATE: 2 POWDER TOPICAL at 08:18

## 2024-05-31 NOTE — PROGRESS NOTES
Patient having increased oxygen requirements and air hunger. Heated high flow at 100% FiO2 and increased to 55 LPM Electronically signed by Elham Grimes RN on 5/31/2024 at 10:41 AM

## 2024-05-31 NOTE — PROGRESS NOTES
Cardiology Progress Note Cruz Perkins MD      Patient:  Edie Cat  [id number]    Patient Active Problem List    Diagnosis Date Noted    Atrial fibrillation, persistent (HCC) 05/02/2024     Priority: High    Itching 05/29/2024     Priority: Low    Generalized weakness 05/29/2024     Priority: Low    Rash 05/29/2024     Priority: Low    Mild malnutrition (HCC) 05/27/2024     Priority: Low    Palliative care patient 05/22/2024     Priority: Low    Rash in adult 05/22/2024     Priority: Low    Diabetes mellitus (HCC) 09/26/2011     Priority: Low     Overview Note:     replace inactive diagnosis      Hyperglycemia 09/26/2011     Priority: Low    Morbid obesity (HCC) 09/26/2011     Priority: Low    History of stroke 09/26/2011     Priority: Low    Pulmonary embolus (HCC) 09/26/2011     Priority: Low    Systemic hypertension 09/26/2011     Priority: Low    Functional blindness 09/26/2011     Priority: Low    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     Priority: Low    Arthritis, degenerative 09/26/2011     Priority: Low    Dementia (HCC) 09/26/2011     Priority: Low    Former cigarette smoker 09/26/2011     Priority: Low    Psoriasis 09/26/2011     Priority: Low    Hyponatremia 09/26/2011     Priority: Low    Vitamin D deficiency 09/26/2011     Priority: Low    Atrial fibrillation (HCC) 08/12/2011     Priority: Low     Overview Note:     8/12/2011 DCCV on aminodarone      CAD (coronary artery disease) 08/12/2011     Priority: Low     Overview Note:     8/15/2011  cardiolyte negative for myocardial ischemia, EF  66%         Admit Date:  5/2/2024    Admission Problem List: Present on Admission:   (Resolved) COVID-19   Palliative care patient   Dementia (HCC)   Diabetes mellitus (HCC)   Functional blindness   Psoriasis   Vitamin D deficiency   Rash in adult   Mild malnutrition (HCC)   Itching   Generalized weakness   Rash      Cardiac Specific Data:  Specialty Problems          Cardiology Problems  Nerves: No cranial nerve deficit.      Deep Tendon Reflexes: Reflexes normal.                 Lab Data:  CBC:   Recent Labs     05/30/24  0435 05/30/24 1924 05/31/24  0120   WBC 14.4* 22.1* 18.8*   HGB 10.0* 10.6* 10.2*   HCT 30.4* 33.5* 32.8*   MCV 76.2* 78.6* 80.2*    450* 438*     BMP:   Recent Labs     05/29/24  1140 05/30/24  0425 05/30/24  0435 05/30/24  0914 05/30/24  1034 05/31/24  0120 05/31/24  0125   *  --  135*  --   --  134*  --    K 3.5   < > 3.6   < > 3.3 3.9 3.8   CL 96*  --  95*  --   --  93*  --    CO2 25  --  27  --   --  31*  --    BUN 58*  --  68*  --   --  79*  --    CREATININE 0.8  --  0.8  --   --  1.4*  --     < > = values in this interval not displayed.     LIVER PROFILE: No results for input(s): \"AST\", \"ALT\", \"LIPASE\", \"AMYLASE\", \"BILIDIR\", \"BILITOT\", \"ALKPHOS\" in the last 72 hours.    Invalid input(s): \"ALB\"  PT/INR:   Recent Labs     05/30/24 1924   PROTIME 20.1*   INR 1.77*     APTT:   Recent Labs     05/30/24  1924 05/31/24  0120 05/31/24  0600   APTT 32.1 85.7* 123.6*     BNP:  No results for input(s): \"BNP\" in the last 72 hours.  CK, CKMB, Troponin: @LABRCNT (CKTOTAL:3, CKMB:3, TROPONINI:3)@    IMAGING:  XR CHEST PORTABLE    Result Date: 5/31/2024  EXAM: CHEST X-RAY ONE VIEW.  HISTORY:  Follow-up respiratory failure.  COMPARISON:  05/30/2024 chest x-ray.  FINDINGS:  The cardiac silhouette is enlarged.  Interval increased opacity within both mid to lower lungs.  There is no pneumothorax.  No change in the osseous structures.      Cardiomegaly with interval development of small to moderate bilateral pleural effusions and bilateral mid to lower lung pulmonary edema versus atelectasis and/or pneumonia.   ______________________________________ Electronically signed by: HIREN CHACON M.D. Date:     05/31/2024 Time:    08:05     XR CHEST PORTABLE    Result Date: 5/30/2024    EXAM:  SINGLE FRONTAL VIEW OF THE CHEST  HISTORY: Follow up support equipment.  COMPARISON:  Chest

## 2024-05-31 NOTE — PROGRESS NOTES
Pulmonary and Critical Care Progress note.    [facility] ROSALBA Cat    MRN# [id number]    Acct# [id number]  5/31/2024   1:09 PM CDT    Referring Provider:Juan Manuel Maradiaga MD      Chief Complaint: Respiratory failure on mechanical ventilation  HPI: Patient is extubated to high flow nasal cannula.  She is now comfort care only.    Medications:  The following medications were reviewed and adjustments made when necessary.    glycopyrrolate, 0.2 mg, IntraVENous, BID    acetylcysteine, 600 mg, Inhalation, BID RT    levalbuterol, 0.63 mg, Nebulization, TID    [Held by provider] metoprolol, 5 mg, IntraVENous, Q6H    lidocaine 1 % injection, 5 mL, IntraDERmal, Once    sodium chloride flush, 5-40 mL, IntraVENous, 2 times per day    digoxin, 125 mcg, Oral, Every Other Day    mineral oil-hydrophilic petrolatum, , Topical, Nightly    pantoprazole (PROTONIX) 40 mg in sodium chloride (PF) 0.9 % 10 mL injection, 40 mg, IntraVENous, Daily    insulin glargine, 20 Units, SubCUTAneous, BID    insulin lispro, 0-16 Units, SubCUTAneous, TID WC    insulin lispro, 0-4 Units, SubCUTAneous, Nightly    verapamil, 80 mg, Oral, 3 times per day    [Held by provider] metoprolol succinate, 100 mg, Oral, BID    chlorhexidine, 15 mL, Mouth/Throat, BID    urea, 15 g, Oral, Daily    miconazole, , Topical, BID    [Held by provider] apixaban, 5 mg, Oral, BID    vitamin D, 50,000 Units, Oral, Weekly    vitamin D, 5,000 Units, Oral, Daily    donepezil, 10 mg, Oral, Nightly    atorvastatin, 20 mg, Oral, Daily         Physical Exam:  Vitals:    05/31/24 0900 05/31/24 0952 05/31/24 1000 05/31/24 1143   BP: 105/62 102/78 100/71 (!) 105/56   Pulse: (!) 103 (!) 101 89 (!) 112   Resp: (!) 35  (!) 31    Temp:       TempSrc:       SpO2: 96%  93%    Weight:       Height:          Intake/Output Summary (Last 24 hours) at 5/31/2024 1308  Last data filed at 5/31/2024 1000  Gross per 24 hour   Intake 2743.26 ml   Output 362 ml   Net 2381.26 ml          General appearance: Elderly female who is on high flow nasal cannula.  HEENT:normocephalic atraumatic  Heart:S1S2 no murmurs  Lungs:diminished bilaterally no rubs or tenderness or dullness to percussion  Abdomen:Soft non tender no organomegaly  Extremities:no clubbing cyanosis or edema  Neuro: Sedated  Skin:intact        The following lab data was reviewed:  CBC   Recent Labs     05/29/24  0330 05/30/24  0435 05/30/24  1924 05/31/24  0120   WBC 13.6* 14.4* 22.1* 18.8*   RBC 3.99* 3.99* 4.26 4.09*   HGB 10.1* 10.0* 10.6* 10.2*   HCT 30.8* 30.4* 33.5* 32.8*    243 450* 438*   MCV 77.2* 76.2* 78.6* 80.2*   MCH 25.3* 25.1* 24.9* 24.9*   MCHC 32.8* 32.9* 31.6* 31.1*   RDW 14.8* 14.7* 14.9* 15.0*        BMP   Recent Labs     05/29/24  1140 05/30/24  0425 05/30/24  0435 05/30/24  0914 05/30/24  1034 05/31/24  0120 05/31/24  0125   *  --  135*  --   --  134*  --    K 3.5   < > 3.6 3.2 3.3 3.9 3.8   CL 96*  --  95*  --   --  93*  --    CO2 25  --  27  --   --  31*  --    BUN 58*  --  68*  --   --  79*  --    CREATININE 0.8  --  0.8  --   --  1.4*  --    CALCIUM 7.5*  --  7.4*  --   --  7.5*  --    GLUCOSE 399*  --  265*  --   --  82  --     < > = values in this interval not displayed.        ABG   Recent Labs     05/30/24  0425 05/30/24  0914 05/30/24  1034 05/31/24  0125   PHART 7.580* 7.530* 7.520* 7.530*   OMI4EZO 36.0 42.0 42.0 40.0   PO2ART 69.0* 72.0* 91.0 69.0*   QCR6AQH 33.8* 35.1* 34.3* 33.4*   U1OCIITX 93.7 95.0 95.6 93.1   BEART 11.1* 11.3* 10.4* 9.9*       Liver function studies and cardiac markers   Recent Labs     05/30/24  1924 05/31/24  0120   MG  --  1.8   CALCIUM  --  7.5*   INR 1.77*  --        Microbiology and Cultures No results for input(s): \"BC\", \"LABGRAM\", \"CULTRESP\", \"BFCX\" in the last 72 hours.      Radiographs reviewed:     XR CHEST PORTABLE    Result Date: 5/30/2024  Stable support equipment with left effusion and adjacent atelectasis versus consolidation.  Support equipment

## 2024-05-31 NOTE — PROGRESS NOTES
Hospitalist Progress Note  [facility]     Patient: Edie Cat  : 1945  MRN: [id number]  Code Status: DNR    Hospital Day:    Date of Service: 2024    Subjective:   Patient seen and examined.  Somnolent.    Past Medical History:   Diagnosis Date    A-fib (HCC)     CAD (coronary artery disease)     Cerebral artery occlusion with cerebral infarction (HCC)     COPD (chronic obstructive pulmonary disease) (HCC)     COVID     Dementia (HCC)     Diabetes mellitus (HCC)     Functional blindness 2011    Hx of blood clots     Hyperlipidemia     Hypertension     Knee arthroplasty 2011    Palliative care patient 2024    Thyroid disease     TIA (transient ischemic attack)        Past Surgical History:   Procedure Laterality Date    ABDOMEN SURGERY      APPENDECTOMY      CHOLECYSTECTOMY      TOTAL KNEE ARTHROPLASTY  2011       History reviewed. No pertinent family history.    Social History     Socioeconomic History    Marital status:      Spouse name: Not on file    Number of children: Not on file    Years of education: Not on file    Highest education level: Not on file   Occupational History    Not on file   Tobacco Use    Smoking status: Never    Smokeless tobacco: Never   Vaping Use    Vaping Use: Never used   Substance and Sexual Activity    Alcohol use: Not Currently    Drug use: Never    Sexual activity: Not on file   Other Topics Concern    Not on file   Social History Narrative    Not on file     Social Determinants of Health     Financial Resource Strain: Not on file   Food Insecurity: Not on file   Transportation Needs: Not on file   Physical Activity: Not on file   Stress: Not on file   Social Connections: Not on file   Intimate Partner Violence: Not on file   Housing Stability: Not on file       Current Facility-Administered Medications   Medication Dose Route Frequency Provider Last Rate Last Admin    HYDROmorphone HCl PF (DILAUDID) injection 0.25  5/30/2024  Requiring high flow oxygenation to maintain goal sats  Pulmonary following  Palliative following for goals of care  Heparin gtt covering DVT prophylaxis  Discussed treatment plan with kindra/RN/pulmonary    Total critical care time: 47 minutes    The above note was generated using voice recognition software. Inadvertent typographical errors in transcription may have occurred.    Juan Manuel Maradiaga MD   5/31/2024 1:48 PM

## 2024-05-31 NOTE — PROGRESS NOTES
Heated High Flow 100% FiO2, increased to 60 LPM. Electronically signed by Elham Grimes RN on 5/31/2024 at 10:45 AM

## 2024-05-31 NOTE — ACP (ADVANCE CARE PLANNING)
Advance Care Planning      Palliative Medicine Provider (MD/NP)  Advance Care Planning (ACP) Conversation      Date of Conversation: 05/31/24  The patient and/or authorized decision maker consented to a voluntary Advance Care Planning conversation.   Individuals present for the conversation:   Patient and Legal healthcare agent named below    Legal Healthcare Agent(s):    Primary Decision Maker: Lulú Lewis - [phone]    ACP documents available in EMR prior to discussion:  -None    Primary Palliative Diagnosis(es):  Acute hypoxemic respiratory failure  Tachy/behzad syndrome/difficult to control afib   S/p cardiac arrest     Conversation Summary:  Patient does not currently have decisional capacity.  She is currently minimally responsive and has dementia at baseline.  Overnight she has had worsening respiratory failure and I discussed goals of care and CODE STATUS with her daughter at length today.  We reviewed the patient's prolonged difficult hospital course and current clinical concerns.  We reviewed quality of life.  We discussed meaning of full code as well as DNR and that we are concerned the patient will require reintubation given worsening respiratory status.  At this point her daughter feels that she would like to focus on comfort care and we will make the change to DNR at this time.  She states understanding that if the patient continues to worsen she will receive medication for comfort and we will not move forward with intubation or resuscitative efforts.  She confirms that they do not plan to pursue any procedures at this point.  She states understanding of the patient will likely receive IV medication for comfort as the patient cannot swallow and multiple attempts at placing a Dobbhoff yesterday were unsuccessful.  They do not plan to pursue alternate means of nutrition or PEG at this point.  She request consideration for inpatient hospice and we discussed that if the patient qualifies for  inpatient hospice she would likely be moved later this afternoon.  We discussed that should the patient stabilize in inpatient hospice they will then discuss with her whether or not to pursue hospice at a nursing facility or at home.  She states understanding and acceptance of this.    Resuscitation Status:    Code Status: DNR        I spent 50 minutes providing separately identifiable ACP services with the patient and/or surrogate decision maker in a voluntary, in-person conversation discussing the patient's wishes and goals as detailed in the above note.       Akosua Soria PA-C

## 2024-05-31 NOTE — PROGRESS NOTES
Spoke with patients get [place] in regards to patients current status. There has been discussion about possible reintubation and the need for PEG placement. At this time get [place] would like to make the patient a DNR. Second RN verification by Annie Palma RN.   Electronically signed by Elham Grimes RN on 5/31/2024 at 10:01 AM  Electronically signed by Annie Palma RN on 5/31/2024 at 10:03 AM

## 2024-05-31 NOTE — PROGRESS NOTES
Palliative Care Progress Note  5/31/2024 11:58 AM    Patient:  Edie Cat  YOB: 1945  Primary Care Physician: Dudley Eduardo  Advance Directive: DNR  Admit Date: 5/2/2024       Hospital Day: 29  Portions of this note have been copied forward, however, changed to reflect the most current clinical status of this patient.    CHIEF COMPLAINT/REASON FOR CONSULTATION Goals of care, family support, Code status, symptom management     SUBJECTIVE:  Ms. Cat minimally responsive, lethargic, appears distressed w/ HF NC O2 in place.     HPI:  The patient is a 78 y.o. female with PMH dementia, persistent atrial fibrillation, DM II, blindness bilaterally, hypothyroidism, CVA, PE who presented to [facility] on 05/02/2024 from OSH where work up was concerning for COVID-19, Afib RVR, acute hypoxemic respiratory failure.  She is in manage to hospital service and was placed on dexamethasone, pharmacy to dose tocilizumab and Cardizem drip.  She then became bradycardic, medication adjusted and that was resolved.  Plans were for discharge to nursing facility on 5/7/2023, however, she had a change of condition with worsening dyspnea and elevated heart rate.  Solu-Medrol ordered.  Cardiology consulted for persistent A-fib with rapid rates.  Multiple medication adjustments have been made and rate has been difficult to control.  She has had fluctuating tachycardia as well as bradycardia.  She has been continued on Eliquis.  COVID isolation discontinued on 5/20/2024.  She underwent SAMANTHA and no thrombus was noted in the right atrium, left atrial appendage free of thrombus, mild aortic stenosis, EF 45%, small slitlike PFO without significant right to left shunting appreciated, DCCV performed on 5/21/2024.  Sinus rhythm was noted, however, overnight the patient reverted back to A-fib RVR.  Cardiology has reevaluated and has discussed possible AV shweta ablation with pacemaker placement.  Patient remains undecided.  Palliative care  was consulted to assist with goals of care.     Review of Systems:   14 point review of systems is negative except as specifically addressed above.    Objective:   VITALS:  BP (!) 105/56   Pulse (!) 112   Temp 98.5 °F (36.9 °C) (Rectal)   Resp (!) 31   Ht 1.575 m (5' 2\")   Wt 99.1 kg (218 lb 6.4 oz)   SpO2 93%   BMI 39.95 kg/m²   24HR INTAKE/OUTPUT:    Intake/Output Summary (Last 24 hours) at 5/31/2024 1158  Last data filed at 5/31/2024 1000  Gross per 24 hour   Intake 2743.26 ml   Output 432 ml   Net 2311.26 ml     General appearance:79 yo female,ill appearing, distressed, HF NC O2 in place   Head: Normocephalic, without obvious abnormality, atraumatic  Eyes: conjunctivae/corneas clear. PERRL, EOM's intact.   Ears/Nose: normal external ears and nose  Neck/Throat: supple, symmetrical, trachea midline   Pulmonary: tachypneic w/ diffuse rhonchi/crackles  Cardiovascular: irregularly irregular w/ rapid rate    Gastrointestinal: soft, obese, non-tender   Musculoskeletal: trace peripheral edema   Skin: warm, dry, rash resolving, large patchy areas of dry scaly skin angel on BUEs  Neurologic: minimally responsive     Medications:      lactated ringers IV soln 75 mL/hr at 05/30/24 1753    esmolol 200 mcg/kg/min (05/31/24 1144)    heparin (PORCINE) Infusion 7 Units/kg/hr (05/31/24 0800)    sodium chloride      sodium chloride      DOBUTamine      dextrose 100 mL/hr at 05/15/24 0532      glycopyrrolate  0.2 mg IntraVENous BID    acetylcysteine  600 mg Inhalation BID RT    levalbuterol  0.63 mg Nebulization TID    [Held by provider] metoprolol  5 mg IntraVENous Q6H    lidocaine 1 % injection  5 mL IntraDERmal Once    sodium chloride flush  5-40 mL IntraVENous 2 times per day    digoxin  125 mcg Oral Every Other Day    mineral oil-hydrophilic petrolatum   Topical Nightly    pantoprazole (PROTONIX) 40 mg in sodium chloride (PF) 0.9 % 10 mL injection  40 mg IntraVENous Daily    insulin glargine  20 Units SubCUTAneous BID

## 2024-05-31 NOTE — DISCHARGE SUMMARY
Hospitalist Discharge Summary  [facility]    Patient: Edie Cat  : 1945  MRN: [id number]  Code Status: DNR  PCP: Dudley Eduardo  Attending: Juan Manuel Maradiaga MD  Admission Date: 2024  Discharge Date: 2024    Discharge Medications:   Per hospice    Hospital Course:   78-year-old female with history of dementia initially admitted to critical care unit for COVID-19 infection, acute hypoxemic respiratory failure, and chronic AF with RVR with subsequent cardiac arrest.     Cardiology/EP following  Tentative plans for ablation/PPM placement  Meds per cardiology/EP  Extubated 2024  Requiring high flow oxygenation to maintain goal sats  Pulmonary following  Palliative following for goals of care  Heparin gtt covering DVT prophylaxis  Discussed treatment plan with grandson/RN/pulmonary    Family decided for hospice measures only on 2024.  Palliative care following.  Patient discharged to hospice facility on 2024.    Discharge Exam:   HEENT: normocephalic   Lungs: mostly clear  Cardiovascular: s1 and s2 normal  Abdomen: soft, positive bowel sounds  Extremities: no edema  Neuro: somnolent    Recent Labs     24  0435 24  1924 24  0120   WBC 14.4* 22.1* 18.8*   HGB 10.0* 10.6* 10.2*    450* 438*     Recent Labs     24  1140 24  0425 24  0435 24  0914 24  1034 24  0120 24  0125   *  --  135*  --   --  134*  --    K 3.5   < > 3.6   < > 3.3 3.9 3.8   CL 96*  --  95*  --   --  93*  --    CO2 25  --  27  --   --  31*  --    BUN 58*  --  68*  --   --  79*  --    CREATININE 0.8  --  0.8  --   --  1.4*  --    GLUCOSE 399*  --  265*  --   --  82  --     < > = values in this interval not displayed.     No results for input(s): \"AST\", \"ALT\", \"BILITOT\", \"ALKPHOS\" in the last 72 hours.    Invalid input(s): \"ALB\"  Troponin T: No results for input(s): \"TROPONINI\" in the last 72 hours.  Pro-BNP: No results for input(s):

## 2024-05-31 NOTE — PROGRESS NOTES
The pts dtr signed the adm forms admitting the pt to the [facility] ED.  It is ok to dc the pt, call [phone] to give report then transfer the pt to the [facility] ED.  Emotional and SC support provided.

## 2024-05-31 NOTE — PROGRESS NOTES
Attempted BSE this AM. Spoke with nursing who report patient is not appropriate for therapy at this time. Will continue to follow.     Electronically signed by ROSA Ceron on 5/31/2024 at 8:17 AM

## 2024-06-02 LAB
BACTERIA UR CULT: ABNORMAL
ORGANISM: ABNORMAL
ORGANISM: ABNORMAL